# Patient Record
Sex: MALE | Employment: UNEMPLOYED | URBAN - METROPOLITAN AREA
[De-identification: names, ages, dates, MRNs, and addresses within clinical notes are randomized per-mention and may not be internally consistent; named-entity substitution may affect disease eponyms.]

---

## 2017-07-08 ENCOUNTER — HOSPITAL ENCOUNTER (INPATIENT)
Age: 52
LOS: 8 days | Discharge: HOME HEALTH | DRG: 871 | End: 2017-07-16
Attending: EMERGENCY MEDICINE | Admitting: INTERNAL MEDICINE
Payer: MEDICARE

## 2017-07-08 ENCOUNTER — APPOINTMENT (OUTPATIENT)
Dept: GENERAL RADIOLOGY | Age: 52
DRG: 871 | End: 2017-07-08
Attending: EMERGENCY MEDICINE
Payer: MEDICARE

## 2017-07-08 DIAGNOSIS — J18.9 PNEUMONIA DUE TO INFECTIOUS ORGANISM, UNSPECIFIED LATERALITY, UNSPECIFIED PART OF LUNG: Primary | ICD-10-CM

## 2017-07-08 PROBLEM — I95.9 HYPOTENSION: Status: ACTIVE | Noted: 2017-07-08

## 2017-07-08 PROBLEM — A41.9 SEPSIS (HCC): Status: ACTIVE | Noted: 2017-07-08

## 2017-07-08 PROBLEM — J69.0 ASPIRATION PNEUMONIA (HCC): Status: ACTIVE | Noted: 2017-07-08

## 2017-07-08 PROBLEM — G93.40 ACUTE ENCEPHALOPATHY: Status: ACTIVE | Noted: 2017-07-08

## 2017-07-08 LAB
ALBUMIN SERPL BCP-MCNC: 3.4 G/DL (ref 3.5–5)
ALBUMIN/GLOB SERPL: 0.8 {RATIO} (ref 1.1–2.2)
ALP SERPL-CCNC: 121 U/L (ref 45–117)
ALT SERPL-CCNC: 31 U/L (ref 12–78)
ANION GAP BLD CALC-SCNC: 10 MMOL/L (ref 5–15)
AST SERPL W P-5'-P-CCNC: 39 U/L (ref 15–37)
BASOPHILS # BLD AUTO: 0 K/UL (ref 0–0.1)
BASOPHILS # BLD: 0 % (ref 0–1)
BILIRUB SERPL-MCNC: 0.2 MG/DL (ref 0.2–1)
BUN SERPL-MCNC: 23 MG/DL (ref 6–20)
BUN/CREAT SERPL: 24 (ref 12–20)
CALCIUM SERPL-MCNC: 8.5 MG/DL (ref 8.5–10.1)
CHLORIDE SERPL-SCNC: 96 MMOL/L (ref 97–108)
CO2 SERPL-SCNC: 29 MMOL/L (ref 21–32)
CREAT SERPL-MCNC: 0.96 MG/DL (ref 0.7–1.3)
DIFFERENTIAL METHOD BLD: ABNORMAL
EOSINOPHIL # BLD: 0 K/UL (ref 0–0.4)
EOSINOPHIL NFR BLD: 0 % (ref 0–7)
ERYTHROCYTE [DISTWIDTH] IN BLOOD BY AUTOMATED COUNT: 12.8 % (ref 11.5–14.5)
GLOBULIN SER CALC-MCNC: 4.4 G/DL (ref 2–4)
GLUCOSE SERPL-MCNC: 99 MG/DL (ref 65–100)
HCT VFR BLD AUTO: 36.7 % (ref 36.6–50.3)
HGB BLD-MCNC: 12 G/DL (ref 12.1–17)
LACTATE SERPL-SCNC: 2.1 MMOL/L (ref 0.4–2)
LYMPHOCYTES # BLD AUTO: 6 % (ref 12–49)
LYMPHOCYTES # BLD: 0.5 K/UL (ref 0.8–3.5)
MCH RBC QN AUTO: 32.1 PG (ref 26–34)
MCHC RBC AUTO-ENTMCNC: 32.7 G/DL (ref 30–36.5)
MCV RBC AUTO: 98.1 FL (ref 80–99)
MONOCYTES # BLD: 0.8 K/UL (ref 0–1)
MONOCYTES NFR BLD AUTO: 10 % (ref 5–13)
NEUTS BAND NFR BLD MANUAL: 10 %
NEUTS SEG # BLD: 6.4 K/UL (ref 1.8–8)
NEUTS SEG NFR BLD AUTO: 74 % (ref 32–75)
PLATELET # BLD AUTO: 164 K/UL (ref 150–400)
POTASSIUM SERPL-SCNC: 3.8 MMOL/L (ref 3.5–5.1)
PROT SERPL-MCNC: 7.8 G/DL (ref 6.4–8.2)
RBC # BLD AUTO: 3.74 M/UL (ref 4.1–5.7)
RBC MORPH BLD: ABNORMAL
SODIUM SERPL-SCNC: 135 MMOL/L (ref 136–145)
WBC # BLD AUTO: 7.7 K/UL (ref 4.1–11.1)

## 2017-07-08 PROCEDURE — 74011000258 HC RX REV CODE- 258: Performed by: INTERNAL MEDICINE

## 2017-07-08 PROCEDURE — 71020 XR CHEST PA LAT: CPT

## 2017-07-08 PROCEDURE — 74011000258 HC RX REV CODE- 258: Performed by: FAMILY MEDICINE

## 2017-07-08 PROCEDURE — 85025 COMPLETE CBC W/AUTO DIFF WBC: CPT | Performed by: EMERGENCY MEDICINE

## 2017-07-08 PROCEDURE — 74011000250 HC RX REV CODE- 250: Performed by: FAMILY MEDICINE

## 2017-07-08 PROCEDURE — 87040 BLOOD CULTURE FOR BACTERIA: CPT | Performed by: EMERGENCY MEDICINE

## 2017-07-08 PROCEDURE — 87070 CULTURE OTHR SPECIMN AEROBIC: CPT | Performed by: INTERNAL MEDICINE

## 2017-07-08 PROCEDURE — 74011250636 HC RX REV CODE- 250/636: Performed by: FAMILY MEDICINE

## 2017-07-08 PROCEDURE — 77030011256 HC DRSG MEPILEX <16IN NO BORD MOLN -A

## 2017-07-08 PROCEDURE — 83605 ASSAY OF LACTIC ACID: CPT | Performed by: FAMILY MEDICINE

## 2017-07-08 PROCEDURE — 87077 CULTURE AEROBIC IDENTIFY: CPT | Performed by: INTERNAL MEDICINE

## 2017-07-08 PROCEDURE — 80053 COMPREHEN METABOLIC PANEL: CPT | Performed by: EMERGENCY MEDICINE

## 2017-07-08 PROCEDURE — 96374 THER/PROPH/DIAG INJ IV PUSH: CPT

## 2017-07-08 PROCEDURE — 74011250636 HC RX REV CODE- 250/636: Performed by: INTERNAL MEDICINE

## 2017-07-08 PROCEDURE — 99285 EMERGENCY DEPT VISIT HI MDM: CPT

## 2017-07-08 PROCEDURE — 65610000006 HC RM INTENSIVE CARE

## 2017-07-08 PROCEDURE — 36415 COLL VENOUS BLD VENIPUNCTURE: CPT | Performed by: EMERGENCY MEDICINE

## 2017-07-08 PROCEDURE — 87186 SC STD MICRODIL/AGAR DIL: CPT | Performed by: INTERNAL MEDICINE

## 2017-07-08 RX ORDER — DOCUSATE SODIUM 100 MG/1
100 CAPSULE, LIQUID FILLED ORAL 2 TIMES DAILY
Status: DISCONTINUED | OUTPATIENT
Start: 2017-07-08 | End: 2017-07-16 | Stop reason: HOSPADM

## 2017-07-08 RX ORDER — OMEPRAZOLE 20 MG/1
20 CAPSULE, DELAYED RELEASE ORAL
COMMUNITY

## 2017-07-08 RX ORDER — PHENOBARBITAL 30 MG/1
30 TABLET ORAL 2 TIMES DAILY
COMMUNITY

## 2017-07-08 RX ORDER — PHENOBARBITAL 32.4 MG/1
32.4 TABLET ORAL 2 TIMES DAILY
Status: DISCONTINUED | OUTPATIENT
Start: 2017-07-08 | End: 2017-07-08

## 2017-07-08 RX ORDER — ACETAMINOPHEN 650 MG/1
650 SUPPOSITORY RECTAL
Status: DISCONTINUED | OUTPATIENT
Start: 2017-07-08 | End: 2017-07-16 | Stop reason: HOSPADM

## 2017-07-08 RX ORDER — HYDROMORPHONE HYDROCHLORIDE 1 MG/ML
0.2 INJECTION, SOLUTION INTRAMUSCULAR; INTRAVENOUS; SUBCUTANEOUS
Status: DISCONTINUED | OUTPATIENT
Start: 2017-07-08 | End: 2017-07-16 | Stop reason: HOSPADM

## 2017-07-08 RX ORDER — PHENOBARBITAL SODIUM 65 MG/ML
30 INJECTION INTRAMUSCULAR EVERY 12 HOURS
Status: DISCONTINUED | OUTPATIENT
Start: 2017-07-08 | End: 2017-07-09

## 2017-07-08 RX ORDER — ASCORBIC ACID 500 MG
500 TABLET ORAL 2 TIMES DAILY
Status: DISCONTINUED | OUTPATIENT
Start: 2017-07-08 | End: 2017-07-16 | Stop reason: HOSPADM

## 2017-07-08 RX ORDER — DIVALPROEX SODIUM 125 MG/1
250 TABLET, DELAYED RELEASE ORAL EVERY EVENING
COMMUNITY
End: 2018-06-23

## 2017-07-08 RX ORDER — SODIUM CHLORIDE 0.9 % (FLUSH) 0.9 %
5-10 SYRINGE (ML) INJECTION EVERY 8 HOURS
Status: DISCONTINUED | OUTPATIENT
Start: 2017-07-08 | End: 2017-07-16 | Stop reason: HOSPADM

## 2017-07-08 RX ORDER — DIVALPROEX SODIUM 125 MG/1
125 TABLET, DELAYED RELEASE ORAL DAILY
COMMUNITY
End: 2019-01-04

## 2017-07-08 RX ORDER — POLYETHYLENE GLYCOL 3350 17 G/17G
17 POWDER, FOR SOLUTION ORAL
Status: DISCONTINUED | OUTPATIENT
Start: 2017-07-08 | End: 2017-07-16 | Stop reason: HOSPADM

## 2017-07-08 RX ORDER — PANTOPRAZOLE SODIUM 40 MG/1
40 TABLET, DELAYED RELEASE ORAL
Status: DISCONTINUED | OUTPATIENT
Start: 2017-07-08 | End: 2017-07-09

## 2017-07-08 RX ORDER — ONDANSETRON 2 MG/ML
4 INJECTION INTRAMUSCULAR; INTRAVENOUS
Status: DISCONTINUED | OUTPATIENT
Start: 2017-07-08 | End: 2017-07-16 | Stop reason: HOSPADM

## 2017-07-08 RX ORDER — ENOXAPARIN SODIUM 100 MG/ML
30 INJECTION SUBCUTANEOUS EVERY 24 HOURS
Status: DISCONTINUED | OUTPATIENT
Start: 2017-07-08 | End: 2017-07-10

## 2017-07-08 RX ORDER — DIPHENHYDRAMINE HYDROCHLORIDE 50 MG/ML
12.5 INJECTION, SOLUTION INTRAMUSCULAR; INTRAVENOUS
Status: DISCONTINUED | OUTPATIENT
Start: 2017-07-08 | End: 2017-07-16 | Stop reason: HOSPADM

## 2017-07-08 RX ORDER — SODIUM CHLORIDE 0.9 % (FLUSH) 0.9 %
5-10 SYRINGE (ML) INJECTION AS NEEDED
Status: DISCONTINUED | OUTPATIENT
Start: 2017-07-08 | End: 2017-07-16 | Stop reason: HOSPADM

## 2017-07-08 RX ORDER — IPRATROPIUM BROMIDE AND ALBUTEROL SULFATE 2.5; .5 MG/3ML; MG/3ML
3 SOLUTION RESPIRATORY (INHALATION)
Status: DISCONTINUED | OUTPATIENT
Start: 2017-07-08 | End: 2017-07-16 | Stop reason: HOSPADM

## 2017-07-08 RX ORDER — ACETAMINOPHEN 500 MG
1000 TABLET ORAL
COMMUNITY

## 2017-07-08 RX ORDER — DIVALPROEX SODIUM 250 MG/1
250 TABLET, DELAYED RELEASE ORAL EVERY EVENING
Status: DISCONTINUED | OUTPATIENT
Start: 2017-07-08 | End: 2017-07-08

## 2017-07-08 RX ORDER — NALOXONE HYDROCHLORIDE 0.4 MG/ML
0.4 INJECTION, SOLUTION INTRAMUSCULAR; INTRAVENOUS; SUBCUTANEOUS AS NEEDED
Status: DISCONTINUED | OUTPATIENT
Start: 2017-07-08 | End: 2017-07-16 | Stop reason: HOSPADM

## 2017-07-08 RX ORDER — PHENOBARBITAL 30 MG/1
30 TABLET ORAL 2 TIMES DAILY
Status: DISCONTINUED | OUTPATIENT
Start: 2017-07-08 | End: 2017-07-08 | Stop reason: SDUPTHER

## 2017-07-08 RX ORDER — DIVALPROEX SODIUM 125 MG/1
125 TABLET, DELAYED RELEASE ORAL DAILY
Status: DISCONTINUED | OUTPATIENT
Start: 2017-07-09 | End: 2017-07-08

## 2017-07-08 RX ORDER — DEXTROSE MONOHYDRATE AND SODIUM CHLORIDE 5; .9 G/100ML; G/100ML
100 INJECTION, SOLUTION INTRAVENOUS CONTINUOUS
Status: DISCONTINUED | OUTPATIENT
Start: 2017-07-08 | End: 2017-07-09

## 2017-07-08 RX ORDER — GUAIFENESIN AND DEXTROMETHORPHAN HBR 20; 400 MG/1; MG/1
1 TABLET ORAL
COMMUNITY
End: 2018-06-23

## 2017-07-08 RX ORDER — DIVALPROEX SODIUM 250 MG/1
500 TABLET, DELAYED RELEASE ORAL 2 TIMES DAILY
Status: DISCONTINUED | OUTPATIENT
Start: 2017-07-08 | End: 2017-07-08

## 2017-07-08 RX ADMIN — ENOXAPARIN SODIUM 30 MG: 30 INJECTION SUBCUTANEOUS at 18:05

## 2017-07-08 RX ADMIN — DEXTROSE MONOHYDRATE AND SODIUM CHLORIDE 100 ML/HR: 5; .9 INJECTION, SOLUTION INTRAVENOUS at 17:09

## 2017-07-08 RX ADMIN — SODIUM CHLORIDE 1000 ML: 900 INJECTION, SOLUTION INTRAVENOUS at 14:58

## 2017-07-08 RX ADMIN — DIPHENHYDRAMINE HYDROCHLORIDE 12.5 MG: 50 INJECTION, SOLUTION INTRAMUSCULAR; INTRAVENOUS at 21:29

## 2017-07-08 RX ADMIN — PIPERACILLIN SODIUM,TAZOBACTAM SODIUM 3.38 G: 3; .375 INJECTION, POWDER, FOR SOLUTION INTRAVENOUS at 16:47

## 2017-07-08 RX ADMIN — AZITHROMYCIN MONOHYDRATE 500 MG: 500 INJECTION, POWDER, LYOPHILIZED, FOR SOLUTION INTRAVENOUS at 15:59

## 2017-07-08 RX ADMIN — CEFTRIAXONE SODIUM 1 G: 1 INJECTION, POWDER, FOR SOLUTION INTRAMUSCULAR; INTRAVENOUS at 15:17

## 2017-07-08 RX ADMIN — PHENOBARBITAL SODIUM 30 MG: 65 INJECTION INTRAMUSCULAR; INTRAVENOUS at 21:28

## 2017-07-08 RX ADMIN — VALPROATE SODIUM 750 MG: 100 INJECTION, SOLUTION INTRAVENOUS at 21:28

## 2017-07-08 NOTE — ED TRIAGE NOTES
Fevered and congested cough, purulent drainage on his pillow this morning per mother. PMH aspiration pneumonia.

## 2017-07-08 NOTE — H&P
[facility] [place] [facility]  [address]  [phone]    Admission History and Physical      NAME:  Tori Thibodeaux   :   1965   MRN:  [id number]     PCP:  Oliverio Garcia MD     Date/Time:  2017         Subjective:     CHIEF COMPLAINT: fever, cough     HISTORY OF PRESENT ILLNESS:     The patient is a 46 yo hx of cerebral palsy, bedbound, seizure d/o, presented w/ fever, sepsis, possible aspiration pneumonia. The patient cannot give a history. His mother, who is the POA/care taker, stated that the patient has had a worsening productive cough for several weeks, associated with dyspnea, fevers, chills. He has a hx of frequent aspiration pneumonia. The patient recently saw his PCP who prescribed cefdinir for 10 days, which helped his symptoms, but now worsened again. In the ED, temp was 100. WBC 7.7. SBP ~80s. CXR unremarkable. Allergies   Allergen Reactions    Other Medication Swelling     Mother states the patient had a reaction to an antibiotic as a child  She cannot recall the name of the antibiotic nor the indication   Per [id number] [phone] [id number] the patient has had prescriptions for Augmentin       Prior to Admission medications    Medication Sig Start Date End Date Taking? Authorizing Provider   ondansetron (ZOFRAN ODT) 4 mg disintegrating tablet Take 1 Tab by mouth every eight (8) hours as needed for Nausea. 3/6/16   Valentine Hinds MD   pantoprazole (PROTONIX) 20 mg tablet Take 20 mg by mouth daily. Jovanny Mckay MD   phenobarbital (LUMINAL) 64.8 mg tablet Take 1 tablet by mouth two (2) times a day. 14   Stuart Currie MD   ASCORBATE CALCIUM (CORA-C PO) Take 1,000 mg by mouth daily. Historical Provider   DOCUSATE CALCIUM (STOOL SOFTENER PO) Take 1 tablet by mouth two (2) times daily as needed.     Historical Provider   triamcinolone acetonide (KENALOG) 0.1 % topical cream Apply  to affected area two (2) times daily as needed for Skin Irritation. use thin layer    Historical Provider   divalproex DR (DEPAKOTE) 500 mg tablet Take 1 Tab by mouth two (2) times a day. 4/23/14   Phyliss Dull, NP   FOLIC ACID/MV,FE,OTHER MIN (CENTRUM PO) Take 1 tablet by mouth daily. Historical Provider   POLYETHYLENE GLYCOL 3350 (MIRALAX PO) Take  by mouth daily as needed (constipation).     Historical Provider       Past Medical History:   Diagnosis Date    Aspiration pneumonia ([address]     Cerebral palsy ([address]     Ill-defined condition     dsyphasia    Scoliosis     Seizures [address] [address]         Past Surgical History:   Procedure Laterality Date    HX HEENT         Social History   Substance Use Topics    Smoking status: Never Smoker    Smokeless tobacco: Never Used    Alcohol use No        Family History   Problem Relation Age of Onset    Cancer Father      prostate    Cancer Maternal Aunt     Heart Disease Maternal Aunt     Cancer Maternal Grandmother     Cancer Maternal Grandfather     Cancer Paternal Grandmother     Cancer Paternal Grandfather     Cancer Other         Review of Systems: (cannot obtain due to cerebral palsy)  (bold if positive, if negative)    Gen:  Eyes:  ENT:  CVS:  Pulm:  GI:    :    MS:  Skin:  Psych:  Endo:    Hem:  Renal:    Neuro:          Objective:      VITALS:    Vital signs reviewed; most recent are:    Visit Vitals    BP (!) 81/62    Pulse (!) 111    Temp 99.9 °F (37.7 °C)    Resp 20    Ht 5' 7\" (1.702 m)    Wt 35.8 kg (79 lb)    SpO2 95%    BMI 12.37 kg/m2     SpO2 Readings from Last 6 Encounters:   07/08/17 95%   03/10/16 100%   03/06/16 98%   09/06/14 99%   09/04/14 94%   11/22/13 99%        No intake or output data in the 24 hours ending 07/08/17 1828     Exam:     Physical Exam:    Gen:  Chronically ill-appearing, cachectic, mild distress  HEENT:  Pink conjunctivae, PERRL, hearing intact to voice, moist mucous membranes  Neck:  Supple, without masses, thyroid non-tender  Resp:  No accessory muscle use, coarse BS at bases  Card:  No murmurs, normal S1, S2 without thrills, bruits or peripheral edema  Abd:  Soft, non-tender, non-distended, normoactive bowel sounds are present  Lymph:  No cervical adenopathy  Musc:  No cyanosis or clubbing  Skin:  No rashes   Neuro:  Contracted, moved all ext  Psych:  Awake, alert and oriented x0. Makes moaning sounds    Labs:    Recent Labs      07/08/17   1422   WBC  7.7   HGB  12.0*   HCT  36.7   PLT  164     Recent Labs      07/08/17   1422   NA  135*   K  3.8   CL  96*   CO2  29   GLU  99   BUN  23*   CREA  0.96   CA  8.5   ALB  3.4*   TBILI  0.2   SGOT  39*   ALT  31     Lab Results   Component Value Date/Time    Glucose (POC) 91 08/02/2012 08:18 PM     No results for input(s): PH, PCO2, PO2, HCO3, FIO2 in the last 72 hours. No results for input(s): INR in the last 72 hours. No lab exists for component: INREXT    Radiology and EKG reviewed:   CXR neg    **Old Records reviewed in [facility]**       Assessment/Plan:       Principal Problem:    48 yo hx of cerebral palsy, bedbound, seizure d/o, presented w/ fever, sepsis, possible aspiration pneumonia    1) Sepsis/possible aspiration PNA: 2/4 SIRS on admission, clinically has pneumonia despite negative CXR. High risks for aspiration. Failed outpatient Abx. Will monitor on step down. Check sputum Cx, blood Cx. Empirically start on IV Zosyn, Azithro (for atypical)    2) Acute encephalopathy: more awake at baseline per mom, but now more lethargic. Likely due to sepsis. Will check depakote and phenobarb levels. Monitor closely    3) Hypotension: due to sepsis. Slowly improving with IVF. Will monitor closely    4) Dysphagia/severe protein-jesus malnutrition: has severe muscle wasting. Will keep NPO due to aspiration PNA.   Consult speech, nutrition    5) Cerebral palsy/seizure d/o: cont depakote, phenobarb    Code: Full - discussed with mom/POA    Risk of deterioration: high      Total time spent with patient: 70 Minutes Care Plan discussed with: Family, nursing.   I signed out to Dr. Epifanio Schroeder, who will resume care of patient    Discussed:  Care Plan    Prophylaxis:  Lovenox    Probable Disposition:  Home w/Family           ___________________________________________________    Attending Physician: Cesar Berry MD

## 2017-07-08 NOTE — PROGRESS NOTES
[facility] Dosing Services: 7/8/2017    Consult by Dr. Josie Veloz for conversion of Phenobarbital and Depakote from oral to IV. Home doses:    Phenobarbital 30 mg PO BID   Depakote  mg PO daily in the morning and Depakote  mg PO every evening    Assessment/Plan: Oral bioavailability of Phenobarbital is approximately 70-90%. Order entered for Phenobarbital 30 mg IV every 12 hours. Per [employer], when converting Valproic Acid and derivatives to IV, \"total daily IV dose should be equivalent to the total daily dose of the oral valproate product; administer with the same frequency as oral products. \" Order entered for Valproate 625 mg IV every 24 hours at 0900 and Valproate 750 mg IV every 24 hours at 2100. Pharmacist will continue to monitor and will contact physician to recommend resumption of oral medications as soon as patient able to tolerate.     Sandy Haas, Pharmacist

## 2017-07-08 NOTE — ROUTINE PROCESS
Code Purple SBAR    Report given Maida TSAI. SIRS Criteria HR >90 bpm, SBP <90 / MAP <65  Mental Status Level of Consciousness: Alert    Labs/Lactic Acid Drawn at:   1424, Lactate 1 result: 2.1, Repeat Lactate due: If yes, time due: 1823    Fluid resuscitation total 1000mL Completed    Antibiotics Hanging?  YES    Vasopressor Hanging  No    Visit Vitals    BP (!) 87/70    Pulse (!) 111    Temp 99.9 °F (37.7 °C)    Resp 20    Ht 5' 7\" (1.702 m)    Wt 35.8 kg (79 lb)    SpO2 94%    BMI 12.37 kg/m2

## 2017-07-08 NOTE — PROGRESS NOTES
[facility]: MED RECONCILIATION    Comments/Recommendations:    Cefdinir 300 mg PO BID x 10 days from 6/26/2017 through 7/5/2017. Per patient's mother, was started because patient was listless, lethargic, and had congestion. Patient completed full course of antibiotics.  Divalproex DR - Patient takes 500 mg PO BID and also takes 125 mg in the morning and 250 mg in the evening.  Patient takes most medications in applesauce. Per patient's mother, he swallows tablets/capsules whole with the applesauce, he does not chew the tablets/capsules. Medications added:     · Divalproex  mg - Takes 1 PO every morning and 2 PO every evening. (Takes in addition to Divalproex  mg PO twice daily.)    Medications removed:    · Multivitamin  · Ondansetron  · Pantoprazole  · Miralax    Medications adjusted:    · Phenobarbital 30 mg PO BID    Information obtained from: patient's mother    Allergies: Other medication - per patient's mother, he had a reaction as a child to an antibiotic but she does not recall the name of the medication. Patient recently tolerated 10 days of Cefdinir. Prior to Admission Medications   Prescriptions Last Dose Informant Patient Reported? Taking? DOCUSATE CALCIUM (STOOL SOFTENER PO)  Parent Yes Yes   Sig: Take 1 tablet by mouth two (2) times daily as needed. PHENobarbital (LUMINAL) 30 mg tablet 7/8/2017 at AM Parent Yes Yes   Sig: Take 30 mg by mouth two (2) times a day. acetaminophen (TYLENOL) 500 mg tablet 7/8/2017 at AM Parent Yes Yes   Sig: Take 1,000 mg by mouth every six (6) hours as needed for Pain. ascorbic acid, vitamin C, powd 7/8/2017 at Unknown time Parent Yes Yes   Sig: Take 1,000 mg by mouth daily. Mix with liquid prior to administration. divalproex DR (DEPAKOTE) 125 mg tablet 7/8/2017 at AM Parent Yes Yes   Sig: Take 125 mg by mouth daily. divalproex DR (DEPAKOTE) 125 mg tablet 7/7/2017 at PM Parent Yes Yes   Sig: Take 250 mg by mouth every evening.    divalproex DR (DEPAKOTE) 500 mg tablet 7/8/2017 at AM Parent No Yes   Sig: Take 1 Tab by mouth two (2) times a day. guaiFENesin-dextromethorphan (G-FENESIN DM)  mg tab tablet 7/8/2017 at AM Parent Yes Yes   Sig: Take 1 Tab by mouth every four (4) hours as needed. omeprazole (PRILOSEC) 20 mg capsule 7/8/2017 at AM Parent Yes Yes   Sig: Take 20 mg by mouth Before breakfast and dinner. triamcinolone acetonide (KENALOG) 0.1 % topical cream  Parent Yes No   Sig: Apply  to affected area two (2) times daily as needed for Skin Irritation.  use thin layer        Austen Duong, Pharmacist

## 2017-07-08 NOTE — ED PROVIDER NOTES
Patient is a 47 y.o. male presenting with cough. The history is provided by a parent. Cough   This is a new problem. The current episode started more than 1 week ago (2-3 weeks ago). The problem occurs hourly. Progression since onset: improved with omnicef given outpatient for 10 day course, still present now after finishing  The cough is productive of sputum. There has been a fever of 100 - 100.9 F. Pertinent negatives include no vomiting. He has tried antibiotics for the symptoms. He is not a smoker. His past medical history is significant for pneumonia. Past Medical History:   Diagnosis Date    [facility] [address]     Ill-defined condition     dsyphasia    Scoliosis     Seizures ([facility])        Past Surgical History:   Procedure Laterality Date    HX HEENT           Family History:   Problem Relation Age of Onset    Cancer Father      prostate    Cancer Maternal Aunt     Heart Disease Maternal Aunt     Cancer Maternal Grandmother     Cancer Maternal Grandfather     Cancer Paternal Grandmother     Cancer Paternal Grandfather     Cancer Other        Social History     Social History    Marital status: SINGLE     Spouse name: N/A    Number of children: N/A    Years of education: N/A     Occupational History    Not on file. Social History Main Topics    Smoking status: Never Smoker    Smokeless tobacco: Never Used    Alcohol use No    Drug use: No    Sexual activity: Not on file     Other Topics Concern    Not on file     Social History Narrative         ALLERGIES: Other medication    Review of Systems   Respiratory: Positive for cough. Gastrointestinal: Negative for diarrhea and vomiting. Genitourinary: Negative for hematuria. Skin: Negative for color change and rash.        Vitals:    07/08/17 1350 07/08/17 1415 07/08/17 1425   BP: 97/50 (!) 88/61    Pulse: (!) 111     Resp: 20     Temp: 99.9 °F (37.7 °C)     SpO2: 90% 94% 94%   Weight: 35.8 kg (79 lb)     Height: 5' 7\" (1.702 m)              Physical Exam   Constitutional: He is oriented to person, place, and time. No distress. HENT:   Head: Atraumatic. Mouth/Throat: No oropharyngeal exudate. Eyes: Conjunctivae and EOM are normal. Pupils are equal, round, and reactive to light. Right eye exhibits no discharge. Left eye exhibits no discharge. No scleral icterus. Neck: Normal range of motion. Neck supple. No thyromegaly present. Cardiovascular: Regular rhythm. Exam reveals no gallop and no friction rub. No murmur heard. tachcyardic   Pulmonary/Chest: Effort normal. No respiratory distress. He has no wheezes. He has no rales. Abdominal: Soft. He exhibits no distension and no mass. There is no tenderness. There is no rebound. Musculoskeletal:   Severe scoliosis   Lymphadenopathy:     He has no cervical adenopathy. Neurological: He is alert and oriented to person, place, and time. No cranial nerve deficit. Skin: Skin is warm. No rash noted. He is not diaphoretic. No erythema. Psychiatric: He has a normal mood and affect. His behavior is normal.   Nursing note and vitals reviewed. CXR is reviewed in comparison to prior shows consolidation over cardiac silhouette. MDM  Number of Diagnoses or Management Options  Diagnosis management comments: SIRS with source, hx PNA and concern for aspiration. IV abx, sepsis protocol, admission. ED Course       Procedures      Dr. Corinne Leys will evaluate for admission.

## 2017-07-08 NOTE — IP AVS SNAPSHOT
Current Discharge Medication List  
  
START taking these medications Dose & Instructions Dispensing Information Comments Morning Noon Evening Bedtime  
 levoFLOXacin 500 mg tablet Commonly known as:  Kasey Simsn Your last dose was: Your next dose is:    
   
   
 Dose:  500 mg Take 1 Tab by mouth daily. Quantity:  7 Tab Refills:  0 CONTINUE these medications which have NOT CHANGED Dose & Instructions Dispensing Information Comments Morning Noon Evening Bedtime  
 acetaminophen 500 mg tablet Commonly known as:  TYLENOL Your last dose was: Your next dose is:    
   
   
 Dose:  1000 mg Take 1,000 mg by mouth every six (6) hours as needed for Pain. Refills:  0  
     
   
   
   
  
 ascorbic acid (vitamin C) Powd Your last dose was: Your next dose is:    
   
   
 Dose:  1000 mg Take 1,000 mg by mouth daily. Mix with liquid prior to administration. Refills:  0  
     
   
   
   
  
 * divalproex  mg tablet Commonly known as:  DEPAKOTE Your last dose was: Your next dose is:    
   
   
 Dose:  125 mg Take 125 mg by mouth daily. Refills:  0  
     
   
   
   
  
 * divalproex  mg tablet Commonly known as:  DEPAKOTE Your last dose was: Your next dose is:    
   
   
 Dose:  250 mg Take 250 mg by mouth every evening. Refills:  0  
     
   
   
   
  
 * divalproex  mg tablet Commonly known as:  DEPAKOTE Your last dose was: Your next dose is:    
   
   
 Dose:  500 mg Take 1 Tab by mouth two (2) times a day. Quantity:  60 Tab Refills:  0 Needs a follow up for additional refills  
    
   
   
   
  
 guaiFENesin-dextromethorphan  mg Tab tablet Commonly known as:  G-FENESIN DM Your last dose was: Your next dose is:    
   
   
 Dose:  1 Tab Take 1 Tab by mouth every four (4) hours as needed. Refills:  0  
     
   
   
   
  
 omeprazole 20 mg capsule Commonly known as:  PRILOSEC Your last dose was: Your next dose is:    
   
   
 Dose:  20 mg Take 20 mg by mouth Before breakfast and dinner. Refills:  0 PHENobarbital 30 mg tablet Commonly known as:  LUMINAL Your last dose was: Your next dose is:    
   
   
 Dose:  30 mg Take 30 mg by mouth two (2) times a day. Refills:  0 STOOL SOFTENER PO Your last dose was: Your next dose is:    
   
   
 Dose:  1 Tab Take 1 tablet by mouth two (2) times daily as needed. Refills:  0  
     
   
   
   
  
 triamcinolone acetonide 0.1 % topical cream  
Commonly known as:  KENALOG Your last dose was: Your next dose is:    
   
   
 Apply  to affected area two (2) times daily as needed for Skin Irritation. use thin layer Refills:  0  
     
   
   
   
  
 * Notice: This list has 3 medication(s) that are the same as other medications prescribed for you. Read the directions carefully, and ask your doctor or other care provider to review them with you. Where to Get Your Medications These medications were sent to [facility], [address] [facility]  [address] [facility] [address] [facility] Hours:  24-hours Phone:  [phone]  
  levoFLOXacin 500 mg tablet

## 2017-07-08 NOTE — IP AVS SNAPSHOT
Dusty Mtz 
 
 
 [address] 
[phone] Patient: Teja Fischer MRN: [id number] :1965 You are allergic to the following Allergen Reactions Other Medication Swelling Mother states the patient had a reaction to an antibiotic as a child She cannot recall the name of the antibiotic nor the indication Per [facility] [phone] the patient has had prescriptions for Augmentin Tolerates Cefdinir. Recent Documentation Height Weight BMI Smoking Status 1.702 m 40.4 kg 13.95 kg/m2 Never Smoker Emergency Contacts Name Discharge Info Relation Home Work Mobile Shanti Tamez DISCHARGE CAREGIVER [3] Parent [1] [phone] Juanito Soto  Other Relative [6]   [phone] Eloy Soto  Other Relative [6]   [phone] About your hospitalization You were admitted on:  2017 You last received care in the:  [facility] 5M1 MED SURG 1 You were discharged on:  2017 Unit phone number:  [phone] Why you were hospitalized Your primary diagnosis was:  Sepsis (Hcc) Your diagnoses also included:  Aspiration Pneumonia (Hcc), Hypotension, Acute Encephalopathy, Cerebral Palsy (Hcc) Providers Seen During Your Hospitalizations Provider Role Specialty Primary office phone Loreta Borjas MD Attending Provider Emergency Medicine [phone] Isidoro Espino MD Attending Provider [facility] [phone] Your Primary Care Physician (PCP) Primary Care Physician Office Phone Office Fax Sarai, [address] [place] [address] [phone] Follow-up Information Follow up With Details Comments Contact Info Lidia Rose MD   [address] [address] 
[phone] [address]   [place] [address] [place] [address] 
[phone] Current Discharge Medication List  
  
 START taking these medications Dose & Instructions Dispensing Information Comments Morning Noon Evening Bedtime  
 levoFLOXacin 500 mg tablet Commonly known as:  Mittie Bitter Your last dose was: Your next dose is:    
   
   
 Dose:  500 mg Take 1 Tab by mouth daily. Quantity:  7 Tab Refills:  0 CONTINUE these medications which have NOT CHANGED Dose & Instructions Dispensing Information Comments Morning Noon Evening Bedtime  
 acetaminophen 500 mg tablet Commonly known as:  TYLENOL Your last dose was: Your next dose is:    
   
   
 Dose:  1000 mg Take 1,000 mg by mouth every six (6) hours as needed for Pain. Refills:  0  
     
   
   
   
  
 ascorbic acid (vitamin C) Powd Your last dose was: Your next dose is:    
   
   
 Dose:  1000 mg Take 1,000 mg by mouth daily. Mix with liquid prior to administration. Refills:  0  
     
   
   
   
  
 * divalproex  mg tablet Commonly known as:  DEPAKOTE Your last dose was: Your next dose is:    
   
   
 Dose:  125 mg Take 125 mg by mouth daily. Refills:  0  
     
   
   
   
  
 * divalproex  mg tablet Commonly known as:  DEPAKOTE Your last dose was: Your next dose is:    
   
   
 Dose:  250 mg Take 250 mg by mouth every evening. Refills:  0  
     
   
   
   
  
 * divalproex  mg tablet Commonly known as:  DEPAKOTE Your last dose was: Your next dose is:    
   
   
 Dose:  500 mg Take 1 Tab by mouth two (2) times a day. Quantity:  60 Tab Refills:  0 Needs a follow up for additional refills  
    
   
   
   
  
 guaiFENesin-dextromethorphan  mg Tab tablet Commonly known as:  G-FENESIN DM Your last dose was: Your next dose is:    
   
   
 Dose:  1 Tab Take 1 Tab by mouth every four (4) hours as needed. Refills:  0 omeprazole 20 mg capsule Commonly known as:  PRILOSEC Your last dose was: Your next dose is:    
   
   
 Dose:  20 mg Take 20 mg by mouth Before breakfast and dinner. Refills:  0 PHENobarbital 30 mg tablet Commonly known as:  LUMINAL Your last dose was: Your next dose is:    
   
   
 Dose:  30 mg Take 30 mg by mouth two (2) times a day. Refills:  0 STOOL SOFTENER PO Your last dose was: Your next dose is:    
   
   
 Dose:  1 Tab Take 1 tablet by mouth two (2) times daily as needed. Refills:  0  
     
   
   
   
  
 triamcinolone acetonide 0.1 % topical cream  
Commonly known as:  KENALOG Your last dose was: Your next dose is:    
   
   
 Apply  to affected area two (2) times daily as needed for Skin Irritation. use thin layer Refills:  0  
     
   
   
   
  
 * Notice: This list has 3 medication(s) that are the same as other medications prescribed for you. Read the directions carefully, and ask your doctor or other care provider to review them with you. Where to Get Your Medications These medications were sent to [facility], [facility]  [address] [facility] [address] [facility] Hours:  24-hours Phone:  [phone]  
  levoFLOXacin 500 mg tablet Discharge Instructions Patient Discharge Instructions Grant Desai / [id number] : 1965 Admitted 2017 Discharged: 2017 9:38 AM  
 
ACUTE DIAGNOSES: 
Sepsis ([facility] CHRONIC MEDICAL DIAGNOSES: 
Problem List as of 2017  Date Reviewed: 2017 Codes Class Noted - Resolved * (Principal)Sepsis ([facility] ICD-10-CM: A41.9 ICD-9-CM: 038.9, 995.91  2017 - Present Aspiration pneumonia ([facility] ICD-10-CM: J69.0 ICD-9-CM: 507.0  2017 - Present Hypotension ICD-10-CM: I95.9 ICD-9-CM: 458.9  7/8/2017 - Present Acute encephalopathy ICD-10-CM: G93.40 ICD-9-CM: 348.30  7/8/2017 - Present Fever ICD-10-CM: R50.9 ICD-9-CM: 780.60  8/31/2014 - Present SIRS (systemic inflammatory response syndrome) (HCC) ICD-10-CM: R65.10 ICD-9-CM: 995.90  8/31/2014 - Present Cough ICD-10-CM: R05 ICD-9-CM: 786.2  8/31/2014 - Present Sepsis(995.91) ICD-10-CM: A41.9 ICD-9-CM: 995.91  6/15/2012 - Present UTI (urinary tract infection) ICD-10-CM: N39.0 ICD-9-CM: 599.0  6/15/2012 - Present Overview Signed 6/17/2012  3:58 PM by Belén Haynes Escherichia coli    >100K col/mL Phenobarbital toxicity ICD-10-CM: T42.3X1A 
ICD-9-CM: 967.0, E980.1  6/15/2012 - Present Cerebral palsy ([address] ICD-10-CM: G80.9 ICD-9-CM: 343.9  6/15/2012 - Present Scoliosis ICD-10-CM: M41.9 ICD-9-CM: 737.30  6/15/2012 - Present Dehydration ICD-10-CM: E86.0 ICD-9-CM: 276.51  6/15/2012 - Present Leukocytosis, unspecified ICD-10-CM: M62.079 ICD-9-CM: 288.60  6/15/2012 - Present Encephalopathy ICD-10-CM: G93.40 ICD-9-CM: 348.30  6/15/2012 - Present DISCHARGE MEDICATIONS:  
 
 
 
· It is important that you take the medication exactly as they are prescribed. · Keep your medication in the bottles provided by the pharmacist and keep a list of the medication names, dosages, and times to be taken in your wallet. · Do not take other medications without consulting your doctor. DIET:  Pureed/nectar ACTIVITY: Activity as tolerated ADDITIONAL INFORMATION: If you experience any of the following symptoms then please call your primary care physician or return to the emergency room if you cannot get hold of your doctor: Fever, chills, nausea, vomiting, diarrhea, change in mentation, falling, bleeding, shortness of breath.  
 
FOLLOW UP CARE: 
Dr. Cayetano Reyes MD  you are to call and set up an appointment to see them with in 1 week. Follow-up with specialists at directed by them Information obtained by : 
I understand that if any problems occur once I am at home I am to contact my physician. I understand and acknowledge receipt of the instructions indicated above. Physician's or R.N.'s Signature                                                                  Date/Time Patient or Representative Signature                                                          Date/Time Discharge Orders None [employer] Announcement We are excited to announce that we are making your provider's discharge notes available to you in [employer]. You will see these notes when they are completed and signed by the physician that discharged you from your recent hospital stay. If you have any questions or concerns about any information you see in [employer], please call the Health Information Department where you were seen or reach out to your Primary Care Provider for more information about your plan of care. Introducing [facility]! [facility] introduces [employer] patient portal. Now you can access parts of your medical record, email your doctor's office, and request medication refills online. 1. In your internet browser, go to [email or web address] 2. Click on the First Time User? Click Here link in the Sign In box. You will see the New Member Sign Up page. 3. Enter your [employer] Access Code exactly as it appears below. You will not need to use this code after youve completed the sign-up process. If you do not sign up before the expiration date, you must request a new code.  
 
· [employer] Access Code: [email or web address] 
 Expires: 10/8/2017 12:25 PM 
 
4. Enter the last four digits of your Social Security Number (xxxx) and Date of Birth (mm/dd/yyyy) as indicated and click Submit. You will be taken to the next sign-up page. 5. Create a Sp[employer] ID. This will be your [employer] login ID and cannot be changed, so think of one that is secure and easy to remember. 6. Create a [employer] password. You can change your password at any time. 7. Enter your Password Reset Question and Answer. This can be used at a later time if you forget your password. 8. Enter your e-mail address. You will receive e-mail notification when new information is available in [address] 9. Click Sign Up. You can now view and download portions of your medical record. 10. Click the Download Summary menu link to download a portable copy of your medical information. If you have questions, please visit the Frequently Asked Questions section of the [employer] website. Remember, [employer] is NOT to be used for urgent needs. For medical emergencies, dial 911. Now available from your iPhone and Android! General Information Please provide this summary of care documentation to your next provider. Patient Signature:  ____________________________________________________________ Date:  ____________________________________________________________  
  
Smithlakeshia Potter Provider Signature:  ____________________________________________________________ Date:  ____________________________________________________________

## 2017-07-09 ENCOUNTER — APPOINTMENT (OUTPATIENT)
Dept: CT IMAGING | Age: 52
DRG: 871 | End: 2017-07-09
Attending: INTERNAL MEDICINE
Payer: MEDICARE

## 2017-07-09 ENCOUNTER — APPOINTMENT (OUTPATIENT)
Dept: GENERAL RADIOLOGY | Age: 52
DRG: 871 | End: 2017-07-09
Attending: INTERNAL MEDICINE
Payer: MEDICARE

## 2017-07-09 LAB
ALBUMIN SERPL BCP-MCNC: 2.4 G/DL (ref 3.5–5)
ALBUMIN/GLOB SERPL: 0.7 {RATIO} (ref 1.1–2.2)
ALP SERPL-CCNC: 88 U/L (ref 45–117)
ALT SERPL-CCNC: 26 U/L (ref 12–78)
ANION GAP BLD CALC-SCNC: 5 MMOL/L (ref 5–15)
AST SERPL W P-5'-P-CCNC: 33 U/L (ref 15–37)
BILIRUB DIRECT SERPL-MCNC: <0.1 MG/DL (ref 0–0.2)
BILIRUB SERPL-MCNC: 0.1 MG/DL (ref 0.2–1)
BUN SERPL-MCNC: 8 MG/DL (ref 6–20)
BUN/CREAT SERPL: 22 (ref 12–20)
CALCIUM SERPL-MCNC: 8.1 MG/DL (ref 8.5–10.1)
CHLORIDE SERPL-SCNC: 106 MMOL/L (ref 97–108)
CO2 SERPL-SCNC: 27 MMOL/L (ref 21–32)
CREAT SERPL-MCNC: 0.37 MG/DL (ref 0.7–1.3)
ERYTHROCYTE [DISTWIDTH] IN BLOOD BY AUTOMATED COUNT: 12.7 % (ref 11.5–14.5)
GLOBULIN SER CALC-MCNC: 3.6 G/DL (ref 2–4)
GLUCOSE SERPL-MCNC: 113 MG/DL (ref 65–100)
HCT VFR BLD AUTO: 30.6 % (ref 36.6–50.3)
HGB BLD-MCNC: 9.9 G/DL (ref 12.1–17)
LACTATE SERPL-SCNC: 0.9 MMOL/L (ref 0.4–2)
MAGNESIUM SERPL-MCNC: 1.4 MG/DL (ref 1.6–2.4)
MCH RBC QN AUTO: 31.9 PG (ref 26–34)
MCHC RBC AUTO-ENTMCNC: 32.4 G/DL (ref 30–36.5)
MCV RBC AUTO: 98.7 FL (ref 80–99)
PHENOBARB SERPL-MCNC: 22.8 UG/ML (ref 15–40)
PHOSPHATE SERPL-MCNC: 1.8 MG/DL (ref 2.6–4.7)
PLATELET # BLD AUTO: 113 K/UL (ref 150–400)
POTASSIUM SERPL-SCNC: 3.3 MMOL/L (ref 3.5–5.1)
PREALB SERPL-MCNC: 14.2 MG/DL (ref 20–40)
PROT SERPL-MCNC: 6 G/DL (ref 6.4–8.2)
RBC # BLD AUTO: 3.1 M/UL (ref 4.1–5.7)
SODIUM SERPL-SCNC: 138 MMOL/L (ref 136–145)
VALPROATE SERPL-MCNC: 98 UG/ML (ref 50–100)
WBC # BLD AUTO: 5.6 K/UL (ref 4.1–11.1)

## 2017-07-09 PROCEDURE — C1758 CATHETER, URETERAL: HCPCS

## 2017-07-09 PROCEDURE — 36415 COLL VENOUS BLD VENIPUNCTURE: CPT | Performed by: INTERNAL MEDICINE

## 2017-07-09 PROCEDURE — 74011250636 HC RX REV CODE- 250/636: Performed by: FAMILY MEDICINE

## 2017-07-09 PROCEDURE — 74011000250 HC RX REV CODE- 250: Performed by: FAMILY MEDICINE

## 2017-07-09 PROCEDURE — C9113 INJ PANTOPRAZOLE SODIUM, VIA: HCPCS | Performed by: INTERNAL MEDICINE

## 2017-07-09 PROCEDURE — 74011636320 HC RX REV CODE- 636/320: Performed by: RADIOLOGY

## 2017-07-09 PROCEDURE — 85027 COMPLETE CBC AUTOMATED: CPT | Performed by: INTERNAL MEDICINE

## 2017-07-09 PROCEDURE — 65660000000 HC RM CCU STEPDOWN

## 2017-07-09 PROCEDURE — 83735 ASSAY OF MAGNESIUM: CPT | Performed by: INTERNAL MEDICINE

## 2017-07-09 PROCEDURE — 84100 ASSAY OF PHOSPHORUS: CPT | Performed by: INTERNAL MEDICINE

## 2017-07-09 PROCEDURE — 74011000258 HC RX REV CODE- 258: Performed by: FAMILY MEDICINE

## 2017-07-09 PROCEDURE — 80076 HEPATIC FUNCTION PANEL: CPT | Performed by: INTERNAL MEDICINE

## 2017-07-09 PROCEDURE — 94667 MNPJ CHEST WALL 1ST: CPT

## 2017-07-09 PROCEDURE — 84134 ASSAY OF PREALBUMIN: CPT | Performed by: INTERNAL MEDICINE

## 2017-07-09 PROCEDURE — 74011000258 HC RX REV CODE- 258: Performed by: INTERNAL MEDICINE

## 2017-07-09 PROCEDURE — 71260 CT THORAX DX C+: CPT

## 2017-07-09 PROCEDURE — 80164 ASSAY DIPROPYLACETIC ACD TOT: CPT | Performed by: INTERNAL MEDICINE

## 2017-07-09 PROCEDURE — 74011250636 HC RX REV CODE- 250/636: Performed by: INTERNAL MEDICINE

## 2017-07-09 PROCEDURE — 80048 BASIC METABOLIC PNL TOTAL CA: CPT | Performed by: INTERNAL MEDICINE

## 2017-07-09 PROCEDURE — 77030010547 HC BG URIN W/UMETER -A

## 2017-07-09 PROCEDURE — 71010 XR CHEST PORT: CPT

## 2017-07-09 PROCEDURE — 83605 ASSAY OF LACTIC ACID: CPT | Performed by: INTERNAL MEDICINE

## 2017-07-09 PROCEDURE — 74011000250 HC RX REV CODE- 250: Performed by: INTERNAL MEDICINE

## 2017-07-09 PROCEDURE — 94640 AIRWAY INHALATION TREATMENT: CPT

## 2017-07-09 PROCEDURE — 80184 ASSAY OF PHENOBARBITAL: CPT | Performed by: INTERNAL MEDICINE

## 2017-07-09 RX ORDER — LEVALBUTEROL INHALATION SOLUTION 1.25 MG/3ML
1.25 SOLUTION RESPIRATORY (INHALATION)
Status: DISCONTINUED | OUTPATIENT
Start: 2017-07-09 | End: 2017-07-16 | Stop reason: HOSPADM

## 2017-07-09 RX ORDER — ACETYLCYSTEINE 200 MG/ML
2 SOLUTION ORAL; RESPIRATORY (INHALATION)
Status: DISCONTINUED | OUTPATIENT
Start: 2017-07-09 | End: 2017-07-16 | Stop reason: HOSPADM

## 2017-07-09 RX ORDER — POTASSIUM CHLORIDE 7.45 MG/ML
10 INJECTION INTRAVENOUS
Status: DISPENSED | OUTPATIENT
Start: 2017-07-09 | End: 2017-07-09

## 2017-07-09 RX ORDER — POTASSIUM CHLORIDE 7.45 MG/ML
10 INJECTION INTRAVENOUS ONCE
Status: COMPLETED | OUTPATIENT
Start: 2017-07-09 | End: 2017-07-10

## 2017-07-09 RX ORDER — FUROSEMIDE 10 MG/ML
10 INJECTION INTRAMUSCULAR; INTRAVENOUS ONCE
Status: COMPLETED | OUTPATIENT
Start: 2017-07-09 | End: 2017-07-09

## 2017-07-09 RX ORDER — MAGNESIUM SULFATE 1 G/100ML
1 INJECTION INTRAVENOUS ONCE
Status: COMPLETED | OUTPATIENT
Start: 2017-07-09 | End: 2017-07-10

## 2017-07-09 RX ADMIN — POTASSIUM CHLORIDE 10 MEQ: 10 INJECTION, SOLUTION INTRAVENOUS at 12:10

## 2017-07-09 RX ADMIN — PIPERACILLIN SODIUM,TAZOBACTAM SODIUM 3.38 G: 3; .375 INJECTION, POWDER, FOR SOLUTION INTRAVENOUS at 16:41

## 2017-07-09 RX ADMIN — IOPAMIDOL 93 ML: 755 INJECTION, SOLUTION INTRAVENOUS at 09:00

## 2017-07-09 RX ADMIN — SODIUM CHLORIDE 40 MG: 9 INJECTION INTRAMUSCULAR; INTRAVENOUS; SUBCUTANEOUS at 10:37

## 2017-07-09 RX ADMIN — VALPROATE SODIUM 750 MG: 100 INJECTION, SOLUTION INTRAVENOUS at 21:07

## 2017-07-09 RX ADMIN — Medication 10 ML: at 16:54

## 2017-07-09 RX ADMIN — PIPERACILLIN SODIUM,TAZOBACTAM SODIUM 3.38 G: 3; .375 INJECTION, POWDER, FOR SOLUTION INTRAVENOUS at 23:19

## 2017-07-09 RX ADMIN — Medication 10 ML: at 21:07

## 2017-07-09 RX ADMIN — PIPERACILLIN SODIUM,TAZOBACTAM SODIUM 3.38 G: 3; .375 INJECTION, POWDER, FOR SOLUTION INTRAVENOUS at 00:35

## 2017-07-09 RX ADMIN — SODIUM CHLORIDE 250 ML: 900 INJECTION, SOLUTION INTRAVENOUS at 01:23

## 2017-07-09 RX ADMIN — PHENOBARBITAL SODIUM 30 MG: 65 INJECTION INTRAMUSCULAR; INTRAVENOUS at 08:29

## 2017-07-09 RX ADMIN — ENOXAPARIN SODIUM 30 MG: 30 INJECTION SUBCUTANEOUS at 21:11

## 2017-07-09 RX ADMIN — FUROSEMIDE 10 MG: 10 INJECTION, SOLUTION INTRAMUSCULAR; INTRAVENOUS at 23:13

## 2017-07-09 RX ADMIN — LEVALBUTEROL INHALATION 1.25MG/3ML 1.25 MG: 1.25 SOLUTION RESPIRATORY (INHALATION) at 19:56

## 2017-07-09 RX ADMIN — PIPERACILLIN SODIUM,TAZOBACTAM SODIUM 3.38 G: 3; .375 INJECTION, POWDER, FOR SOLUTION INTRAVENOUS at 08:25

## 2017-07-09 RX ADMIN — AZITHROMYCIN MONOHYDRATE 500 MG: 500 INJECTION, POWDER, LYOPHILIZED, FOR SOLUTION INTRAVENOUS at 16:40

## 2017-07-09 RX ADMIN — VALPROATE SODIUM 625 MG: 100 INJECTION, SOLUTION INTRAVENOUS at 08:29

## 2017-07-09 RX ADMIN — DIPHENHYDRAMINE HYDROCHLORIDE 12.5 MG: 50 INJECTION, SOLUTION INTRAMUSCULAR; INTRAVENOUS at 21:13

## 2017-07-09 RX ADMIN — DEXTROSE MONOHYDRATE AND SODIUM CHLORIDE 100 ML/HR: 5; .9 INJECTION, SOLUTION INTRAVENOUS at 13:16

## 2017-07-09 RX ADMIN — ACETYLCYSTEINE 400 MG: 200 SOLUTION ORAL; RESPIRATORY (INHALATION) at 19:59

## 2017-07-09 RX ADMIN — MAGNESIUM SULFATE HEPTAHYDRATE 1 G: 1 INJECTION, SOLUTION INTRAVENOUS at 10:38

## 2017-07-09 RX ADMIN — POTASSIUM CHLORIDE 10 MEQ: 10 INJECTION, SOLUTION INTRAVENOUS at 13:14

## 2017-07-09 NOTE — PROGRESS NOTES
Bedside and Verbal shift change report given to Starr Thompson RN (oncoming nurse) by Roxana Graf RN (offgoing nurse). Report included the following information SBAR, Kardex, ED Summary, Procedure Summary, Intake/Output, MAR and Recent Results. 2000 Pt needs suctioning and a sputum sample. RT to bedside to collect sample and suction patient of moderate thick tan mucous. 0120 Pt MAP less than 65. Paged Dr. Juanito Max and updated MD on patient using SBAR format. Per MD administer a 250c NS bolus. Will continue to monitor.

## 2017-07-09 NOTE — PROGRESS NOTES
0700  Bedside and Verbal shift change report given to Richelle Dukes RN  (oncoming nurse) by Manoj Hammond RN  (offgoing nurse). Report included the following information SBAR, MAR and Recent Results. 1900  Pt had an uneventful shift. His level of care is now telemetry. VSS. Bedside and Verbal shift change report given to Geri Monsivais RN (oncoming nurse) by Richelle Dukes RN  (offgoing nurse). Report included the following information SBAR, Kardex and Recent Results.

## 2017-07-09 NOTE — CONSULTS
[facility]     Name: Tatianna Kiser MRN: [id number]   : 1965 Hospital: [address]   Date: 2017        Impression Plan   1. Acute respiratory failure  2. Hypoxia  3. Bronchitis vs. PNA  4. Hx of Cerebral palsy  5. Hx of aspiration PNA  6. Hx of seizure disorder               · Continue Zosyn and azithromycin  · CT chest to further evaluate for PNA  · CT abdomen  · Sputum cx  · Phenobarbital level pending  · Follow up Blood cx  · NPO until more awake  · Enox proph  · PPI       Radiology  ( personally reviewed) CXR reviewed: Contorted, no infiltrates noted   ABG No results for input(s): PHI, PO2I, PCO2I in the last 72 hours. Subjective     This patient has been seen and evaluated at the request of Dr. Paola Cohen for PNA . Patient is a 47 y.o. male with PMHx of CP, seizure disorder presenting with increased cough, sputum production and fevers at home. Pt is non-verbal. Pt has a hx of aspiration in the past. CXR negative for infiltrates. Afebrile since admission. Review of Systems:  Review of systems not obtained due to patient factors. Past Medical History:   Diagnosis Date    Aspiration pneumonia ([address]     Cerebral palsy (HCC)     Ill-defined condition     dsyphasia    Scoliosis     Seizure disorder ([address]     Seizures ([facility]       Past Surgical History:   Procedure Laterality Date    HX HEENT        Prior to Admission medications    Medication Sig Start Date End Date Taking? Authorizing Provider   ascorbic acid, vitamin C, powd Take 1,000 mg by mouth daily. Mix with liquid prior to administration. Yes Historical Provider   omeprazole (PRILOSEC) 20 mg capsule Take 20 mg by mouth Before breakfast and dinner. Yes Historical Provider   PHENobarbital (LUMINAL) 30 mg tablet Take 30 mg by mouth two (2) times a day. Yes Historical Provider   divalproex  (DEPAKOTE) 125 mg tablet Take 125 mg by mouth daily.    Yes Historical Provider   divalproex DR (DEPAKOTE) 125 mg tablet Take 250 mg by mouth every evening. Yes Historical Provider   guaiFENesin-dextromethorphan (G-FENESIN DM)  mg tab tablet Take 1 Tab by mouth every four (4) hours as needed. Yes Historical Provider   acetaminophen (TYLENOL) 500 mg tablet Take 1,000 mg by mouth every six (6) hours as needed for Pain. Yes Historical Provider   DOCUSATE CALCIUM (STOOL SOFTENER PO) Take 1 tablet by mouth two (2) times daily as needed. Yes Historical Provider   divalproex DR (DEPAKOTE) 500 mg tablet Take 1 Tab by mouth two (2) times a day. 4/23/14  Yes Perez Baxter NP   triamcinolone acetonide (KENALOG) 0.1 % topical cream Apply  to affected area two (2) times daily as needed for Skin Irritation.  use thin layer    Historical Provider     Current Facility-Administered Medications   Medication Dose Route Frequency    iopamidol (ISOVUE-370) 76 % injection 100 mL  100 mL IntraVENous RAD ONCE    dextrose 5% and 0.9% NaCl infusion  100 mL/hr IntraVENous CONTINUOUS    piperacillin-tazobactam (ZOSYN) 3.375 g in 0.9% sodium chloride (MBP/ADV) 100 mL  3.375 g IntraVENous Q8H    azithromycin (ZITHROMAX) 500 mg in 0.9% sodium chloride (MBP/ADV) 250 mL  500 mg IntraVENous Q24H    ascorbic acid (vitamin C) (VITAMIN C) tablet 500 mg  500 mg Oral BID    pantoprazole (PROTONIX) tablet 40 mg  40 mg Oral ACB&D    sodium chloride (NS) flush 5-10 mL  5-10 mL IntraVENous Q8H    docusate sodium (COLACE) capsule 100 mg  100 mg Oral BID    enoxaparin (LOVENOX) injection 30 mg  30 mg SubCUTAneous Q24H    diphenhydrAMINE (BENADRYL) injection 12.5 mg  12.5 mg IntraVENous QHS    PHENobarbital (LUMINAL) injection 30 mg  30 mg IntraVENous Q12H    valproate (DEPACON) 750 mg in 0.9% sodium chloride 50 mL IVPB  750 mg IntraVENous Q24H    valproate (DEPACON) 625 mg in 0.9% sodium chloride 50 mL IVPB  625 mg IntraVENous Q24H     Allergies   Allergen Reactions    Other Medication Swelling     Mother states the patient had a reaction to an antibiotic as a child  She cannot recall the name of the antibiotic nor the indication   Per [facility] [phone] the patient has had prescriptions for Augmentin  Tolerates Cefdinir. Social History   Substance Use Topics    Smoking status: Never Smoker    Smokeless tobacco: Never Used    Alcohol use No      Family History   Problem Relation Age of Onset    Cancer Father      prostate    Cancer Maternal Aunt     Heart Disease Maternal Aunt     Cancer Maternal Grandmother     Cancer Maternal Grandfather     Cancer Paternal Grandmother     Cancer Paternal Grandfather     Cancer Other           Laboratory: I have personally reviewed the critical care flowsheet and labs.      Recent Labs      07/09/17   0400  07/08/17   1422   WBC  5.6  7.7   HGB  9.9*  12.0*   HCT  30.6*  36.7   PLT  113*  164     Recent Labs      07/09/17   0400  07/08/17   1422   NA  138  135*   K  3.3*  3.8   CL  106  96*   CO2  27  29   GLU  113*  99   BUN  8  23*   CREA  0.37*  0.96   CA  8.1*  8.5   MG  1.4*   --    PHOS  1.8*   --    ALB  2.4*  3.4*   SGOT  33  39*   ALT  26  31       Objective:     Mode Rate Tidal Volume Pressure FiO2 PEEP                    Vital Signs:     TMAX(24)      Intake/Output:   Last shift:         Last 3 shifts:  [id number]  Intake/Output Summary (Last 24 hours) at 07/09/17 0858  Last data filed at 07/09/17 0159   Gross per 24 hour   Intake          1133.33 ml   Output                0 ml   Net          1133.33 ml     EXAM:   GENERAL: well developed and in no distress, contracted, HEENT:  PERRL, EOMI, no alar flaring or epistaxis, oral mucosa moist without cyanosis, NECK:  no jugular vein distention, no retractions, no thyromegaly or masses, LUNGS: CTA, no w/r/r , HEART:  Regular rate and rhythm with no MGR; no edema is present, ABDOMEN:  soft with no tenderness, bowel sounds present, EXTREMITIES:  warm with no cyanosis, atrophied SKIN:  no jaundice or ecchymosis and NEUROLOGIC:  Sleepy and grimaces only to stimuli, protecting airway    Farnaz Weathers MD  [facility]

## 2017-07-09 NOTE — PROGRESS NOTES
1900: Bedside shift change report given to Tamra Grover, RN (oncoming nurse) by Michel Jackson RN (offgoing nurse). Report included the following information SBAR, Kardex, ED Summary, Intake/Output, MAR and Cardiac Rhythm NSR/ST.     1922: Full head to toe assessment and vital signs completed. Patient resting in bed. Non-verbal. Moans and grunts. Follows commands when asked to squeeze hands. 2141: Respiratory at bedside to suction patient due to him de-sating into mid 80's. When suctioning, bright red blood came back. Paged Dr. Melvina No and he ordered to speak with pulmonary. 2144: Paged Dr. Mary Lopez from pulmonary. Waiting on call back. 2205: Spoke with Dr. Mary Lopez from pulmonary. Told to order a chest xray stat. 2243: Paged Dr. Mary Lopez about chest xray results. 10 mg lasix ordered, fluids discontinued, external condom cath     0005: Reassessment and vital signs completed. Vital signs stable. Patient resting in bed with non-rebreather on. 0200: Patient resting in bed. Vital signs stable. 0400: Reassessment and vital signs completed. Vital signs stable. Patient resting in bed with non-rebreather on. Respiratory therapy will switch patient to venturi mask to see how pt tolerates it. 0715: Bedside shift change report given to Mirela Maria RN (oncoming nurse) by Lowell Michele RN (offgoing nurse). Report included the following information SBAR, Kardex, ED Summary, Procedure Summary, Intake/Output, MAR and Cardiac Rhythm Sinus Rhythm/Sinus Tach.

## 2017-07-09 NOTE — PROGRESS NOTES
Daily Progress Note: 7/9/2017  Alessia Hinojosa MD    Assessment/Plan:   1) Sepsis/possible aspiration PNA: 2/4 SIRS on admission, clinically has pneumonia despite negative CXR. High risks for aspiration. Failed outpatient Abx. Check sputum Cx, blood Cx. Empirically on IV Zosyn, Azithro (for atypical)     2) Acute encephalopathy: more awake at baseline per mom, but now more lethargic. Likely due to sepsis. depakote and phenobarb levels. Monitor closely     3) Hypotension: due to sepsis. Slowly improving with IVF. Will monitor closely     4) Dysphagia/severe protein-jesus malnutrition: has severe muscle wasting. Will keep NPO due to aspiration PNA.   Consult speech, nutrition     5) Cerebral palsy/seizure d/o: cont depakote, phenobarb        Problem List:  Problem List as of 7/9/2017  Date Reviewed: 7/8/2017          Codes Class Noted - Resolved    * (Principal)Sepsis ([facility].) ICD-10-CM: A41.9  ICD-9-CM: 038.9, 995.91  7/8/2017 - Present        Aspiration pneumonia ([facility] ICD-10-CM: J69.0  ICD-9-CM: 507.0  7/8/2017 - Present        Hypotension ICD-10-CM: I95.9  ICD-9-CM: 458.9  7/8/2017 - Present        Acute encephalopathy ICD-10-CM: G93.40  ICD-9-CM: 348.30  7/8/2017 - Present        Fever ICD-10-CM: R50.9  ICD-9-CM: 780.60  8/31/2014 - Present        SIRS (systemic inflammatory response syndrome) ([facility] ICD-10-CM: R65.10  ICD-9-CM: 995.90  8/31/2014 - Present        Cough ICD-10-CM: R05  ICD-9-CM: 786.2  8/31/2014 - Present        Sepsis(995.91) ICD-10-CM: A41.9  ICD-9-CM: 995.91  6/15/2012 - Present        UTI (urinary tract infection) ICD-10-CM: N39.0  ICD-9-CM: 599.0  6/15/2012 - Present    Overview Signed 6/17/2012  3:58 PM by [facility]     Escherichia coli    >100K col/mL             Phenobarbital toxicity ICD-10-CM: T42.3X1A  ICD-9-CM: 967.0, E980.1  6/15/2012 - Present        Cerebral palsy ([facility] ICD-10-CM: G80.9  ICD-9-CM: 343.9  6/15/2012 - Present        Scoliosis ICD-10-CM: M41.9  ICD-9-CM: 737.30  6/15/2012 - Present        Dehydration ICD-10-CM: E86.0  ICD-9-CM: 276.51  6/15/2012 - Present        Leukocytosis, unspecified ICD-10-CM: D72.829  ICD-9-CM: 288.60  6/15/2012 - Present        Encephalopathy ICD-10-CM: G93.40  ICD-9-CM: 348.30  6/15/2012 - Present              Subjective:    46 yo hx of cerebral palsy, bedbound, seizure d/o, presented w/ fever, sepsis, possible aspiration pneumonia. The patient cannot give a history. His mother, who is the POA/care taker, stated that the patient has had a worsening productive cough for several weeks, associated with dyspnea, fevers, chills. He has a hx of frequent aspiration pneumonia. The patient recently saw his PCP who prescribed cefdinir for 10 days, which helped his symptoms, but now worsened again. In the ED, temp was 100. WBC 7.7. SBP ~80s. CXR unremarkable    : Moves spontaneously. Coughs episodically. On IV Zosyn and Azith. CT chest/Ab planned for today. Cont NPO until more awake. K+./Mg+ a little low - replace. Review of Systems:   Review of systems not obtained due to patient factors. Objective:   Physical Exam:     Visit Vitals    BP 90/58    Pulse 91    Temp 98.9 °F (37.2 °C)    Resp 14    Ht 5' 7\" (1.702 m)    Wt 38.4 kg (84 lb 10.5 oz)    SpO2 97%    BMI 13.26 kg/m2      O2 Device: Room air    Temp (24hrs), Av.2 °F (37.3 °C), Min:98.9 °F (37.2 °C), Max:99.9 °F (37.7 °C)         1901 -  0700  In: 1133.3 [I.V.:1133.3]  Out: -     General:  Chronically ill-appearing, cachectic, mild distress   Head:  Normocephalic, without obvious abnormality, atraumatic. Eyes:  Conjunctivae/corneas clear. EOMs intact. Throat: Lips, mucosa, and tongue moist..   Neck: Supple, symmetrical, trachea midline, no adenopathy, thyroid: no enlargement/tenderness/nodules, no carotid bruit and no JVD.    Lungs:   Clear to auscultation bilaterally except scattered rhonchi/upper airway noise which clear after cough.   Chest wall:  No tenderness or deformity. Heart:  Regular rate and rhythm, S1, S2 normal, no murmur, click, rub or gallop. Abdomen:   Soft, non-tender. Bowel sounds normal. No masses,  No organomegaly. Extremities: Contractures unchanged. no cyanosis or edema. No apparent calf tenderness or cords. Pulses: 2+ and symmetric all extremities. Skin:  turgor normal.    Neurologic: Non-verbal.  Responds to painful stimuli. Contractures unchanged. Data Review:       Recent Days:  Recent Labs      07/09/17   0400  07/08/17   1422   WBC  5.6  7.7   HGB  9.9*  12.0*   HCT  30.6*  36.7   PLT  113*  164     Recent Labs      07/09/17   0400  07/08/17   1422   NA  138  135*   K  3.3*  3.8   CL  106  96*   CO2  27  29   GLU  113*  99   BUN  8  23*   CREA  0.37*  0.96   CA  8.1*  8.5   MG  1.4*   --    PHOS  1.8*   --    ALB  2.4*  3.4*   TBILI  0.1*  0.2   SGOT  33  39*   ALT  26  31     No results for input(s): PH, PCO2, PO2, HCO3, FIO2 in the last 72 hours. 24 Hour Results:  Recent Results (from the past 24 hour(s))   CBC WITH AUTOMATED DIFF    Collection Time: 07/08/17  2:22 PM   Result Value Ref Range    WBC 7.7 4.1 - 11.1 K/uL    RBC 3.74 (L) 4.10 - 5.70 M/uL    HGB 12.0 (L) 12.1 - 17.0 g/dL    HCT 36.7 36.6 - 50.3 %    MCV 98.1 80.0 - 99.0 FL    MCH 32.1 26.0 - 34.0 PG    MCHC 32.7 30.0 - 36.5 g/dL    RDW 12.8 11.5 - 14.5 %    PLATELET 697 387 - 383 K/uL    NEUTROPHILS 74 32 - 75 %    BAND NEUTROPHILS 10 %    LYMPHOCYTES 6 (L) 12 - 49 %    MONOCYTES 10 5 - 13 %    EOSINOPHILS 0 0 - 7 %    BASOPHILS 0 0 - 1 %    ABS. NEUTROPHILS 6.4 1.8 - 8.0 K/UL    ABS. LYMPHOCYTES 0.5 (L) 0.8 - 3.5 K/UL    ABS. MONOCYTES 0.8 0.0 - 1.0 K/UL    ABS. EOSINOPHILS 0.0 0.0 - 0.4 K/UL    ABS.  BASOPHILS 0.0 0.0 - 0.1 K/UL    DF MANUAL      RBC COMMENTS NORMOCYTIC, NORMOCHROMIC     METABOLIC PANEL, COMPREHENSIVE    Collection Time: 07/08/17  2:22 PM   Result Value Ref Range    Sodium 135 (L) 136 - 145 mmol/L Potassium 3.8 3.5 - 5.1 mmol/L    Chloride 96 (L) 97 - 108 mmol/L    CO2 29 21 - 32 mmol/L    Anion gap 10 5 - 15 mmol/L    Glucose 99 65 - 100 mg/dL    BUN 23 (H) 6 - 20 MG/DL    Creatinine 0.96 0.70 - 1.30 MG/DL    BUN/Creatinine ratio 24 (H) 12 - 20      GFR est AA >60 >60 ml/min/1.73m2    GFR est non-AA >60 >60 ml/min/1.73m2    Calcium 8.5 8.5 - 10.1 MG/DL    Bilirubin, total 0.2 0.2 - 1.0 MG/DL    ALT (SGPT) 31 12 - 78 U/L    AST (SGOT) 39 (H) 15 - 37 U/L    Alk.  phosphatase 121 (H) 45 - 117 U/L    Protein, total 7.8 6.4 - 8.2 g/dL    Albumin 3.4 (L) 3.5 - 5.0 g/dL    Globulin 4.4 (H) 2.0 - 4.0 g/dL    A-G Ratio 0.8 (L) 1.1 - 2.2     LACTIC ACID    Collection Time: 07/08/17  2:23 PM   Result Value Ref Range    Lactic acid 2.1 (HH) 0.4 - 2.0 MMOL/L   CULTURE, BLOOD    Collection Time: 07/08/17  2:23 PM   Result Value Ref Range    Special Requests: NO SPECIAL REQUESTS      Culture result: NO GROWTH AFTER 9 HOURS     CULTURE, BLOOD    Collection Time: 07/08/17  3:08 PM   Result Value Ref Range    Special Requests: NO SPECIAL REQUESTS      Culture result: NO GROWTH AFTER 15 HOURS     METABOLIC PANEL, BASIC    Collection Time: 07/09/17  4:00 AM   Result Value Ref Range    Sodium 138 136 - 145 mmol/L    Potassium 3.3 (L) 3.5 - 5.1 mmol/L    Chloride 106 97 - 108 mmol/L    CO2 27 21 - 32 mmol/L    Anion gap 5 5 - 15 mmol/L    Glucose 113 (H) 65 - 100 mg/dL    BUN 8 6 - 20 MG/DL    Creatinine 0.37 (L) 0.70 - 1.30 MG/DL    BUN/Creatinine ratio 22 (H) 12 - 20      GFR est AA >60 >60 ml/min/1.73m2    GFR est non-AA >60 >60 ml/min/1.73m2    Calcium 8.1 (L) 8.5 - 10.1 MG/DL   CBC W/O DIFF    Collection Time: 07/09/17  4:00 AM   Result Value Ref Range    WBC 5.6 4.1 - 11.1 K/uL    RBC 3.10 (L) 4.10 - 5.70 M/uL    HGB 9.9 (L) 12.1 - 17.0 g/dL    HCT 30.6 (L) 36.6 - 50.3 %    MCV 98.7 80.0 - 99.0 FL    MCH 31.9 26.0 - 34.0 PG    MCHC 32.4 30.0 - 36.5 g/dL    RDW 12.7 11.5 - 14.5 %    PLATELET 263 (L) 530 - 400 K/uL   MAGNESIUM Collection Time: 07/09/17  4:00 AM   Result Value Ref Range    Magnesium 1.4 (L) 1.6 - 2.4 mg/dL   PHOSPHORUS    Collection Time: 07/09/17  4:00 AM   Result Value Ref Range    Phosphorus 1.8 (L) 2.6 - 4.7 MG/DL   HEPATIC FUNCTION PANEL    Collection Time: 07/09/17  4:00 AM   Result Value Ref Range    Protein, total 6.0 (L) 6.4 - 8.2 g/dL    Albumin 2.4 (L) 3.5 - 5.0 g/dL    Globulin 3.6 2.0 - 4.0 g/dL    A-G Ratio 0.7 (L) 1.1 - 2.2      Bilirubin, total 0.1 (L) 0.2 - 1.0 MG/DL    Bilirubin, direct <0.1 0.0 - 0.2 MG/DL    Alk.  phosphatase 88 45 - 117 U/L    AST (SGOT) 33 15 - 37 U/L    ALT (SGPT) 26 12 - 78 U/L   LACTIC ACID    Collection Time: 07/09/17  5:36 AM   Result Value Ref Range    Lactic acid 0.9 0.4 - 2.0 MMOL/L       Medications reviewed  Current Facility-Administered Medications   Medication Dose Route Frequency    iopamidol (ISOVUE-370) 76 % injection 100 mL  100 mL IntraVENous RAD ONCE    dextrose 5% and 0.9% NaCl infusion  100 mL/hr IntraVENous CONTINUOUS    albuterol-ipratropium (DUO-NEB) 2.5 MG-0.5 MG/3 ML  3 mL Nebulization Q4H PRN    piperacillin-tazobactam (ZOSYN) 3.375 g in 0.9% sodium chloride (MBP/ADV) 100 mL  3.375 g IntraVENous Q8H    azithromycin (ZITHROMAX) 500 mg in 0.9% sodium chloride (MBP/ADV) 250 mL  500 mg IntraVENous Q24H    ascorbic acid (vitamin C) (VITAMIN C) tablet 500 mg  500 mg Oral BID    pantoprazole (PROTONIX) tablet 40 mg  40 mg Oral ACB&D    polyethylene glycol (MIRALAX) packet 17 g  17 g Oral DAILY PRN    sodium chloride (NS) flush 5-10 mL  5-10 mL IntraVENous Q8H    sodium chloride (NS) flush 5-10 mL  5-10 mL IntraVENous PRN    HYDROmorphone (PF) (DILAUDID) injection 0.2 mg  0.2 mg IntraVENous Q4H PRN    naloxone (NARCAN) injection 0.4 mg  0.4 mg IntraVENous PRN    diphenhydrAMINE (BENADRYL) injection 12.5 mg  12.5 mg IntraVENous Q4H PRN    ondansetron (ZOFRAN) injection 4 mg  4 mg IntraVENous Q6H PRN    docusate sodium (COLACE) capsule 100 mg  100 mg Oral BID    acetaminophen (TYLENOL) suppository 650 mg  650 mg Rectal Q4H PRN    enoxaparin (LOVENOX) injection 30 mg  30 mg SubCUTAneous Q24H    diphenhydrAMINE (BENADRYL) injection 12.5 mg  12.5 mg IntraVENous QHS    PHENobarbital (LUMINAL) injection 30 mg  30 mg IntraVENous Q12H    valproate (DEPACON) 750 mg in 0.9% sodium chloride 50 mL IVPB  750 mg IntraVENous Q24H    valproate (DEPACON) 625 mg in 0.9% sodium chloride 50 mL IVPB  625 mg IntraVENous Q24H       Care Plan discussed with: Patient/Family and Nurse    Total time spent with patient: 30 minutes.     Lee Ann Rivas MD

## 2017-07-10 ENCOUNTER — APPOINTMENT (OUTPATIENT)
Dept: GENERAL RADIOLOGY | Age: 52
DRG: 871 | End: 2017-07-10
Attending: INTERNAL MEDICINE
Payer: MEDICARE

## 2017-07-10 LAB
ALBUMIN SERPL BCP-MCNC: 2.6 G/DL (ref 3.5–5)
ALBUMIN/GLOB SERPL: 0.6 {RATIO} (ref 1.1–2.2)
ALP SERPL-CCNC: 97 U/L (ref 45–117)
ALT SERPL-CCNC: 31 U/L (ref 12–78)
ANION GAP BLD CALC-SCNC: 6 MMOL/L (ref 5–15)
AST SERPL W P-5'-P-CCNC: 32 U/L (ref 15–37)
BASOPHILS # BLD AUTO: 0 K/UL (ref 0–0.1)
BASOPHILS # BLD: 0 % (ref 0–1)
BILIRUB SERPL-MCNC: 0.2 MG/DL (ref 0.2–1)
BUN SERPL-MCNC: 5 MG/DL (ref 6–20)
BUN/CREAT SERPL: 13 (ref 12–20)
CALCIUM SERPL-MCNC: 8.1 MG/DL (ref 8.5–10.1)
CHLORIDE SERPL-SCNC: 99 MMOL/L (ref 97–108)
CO2 SERPL-SCNC: 31 MMOL/L (ref 21–32)
CREAT SERPL-MCNC: 0.39 MG/DL (ref 0.7–1.3)
EOSINOPHIL # BLD: 0 K/UL (ref 0–0.4)
EOSINOPHIL NFR BLD: 0 % (ref 0–7)
ERYTHROCYTE [DISTWIDTH] IN BLOOD BY AUTOMATED COUNT: 12.3 % (ref 11.5–14.5)
GLOBULIN SER CALC-MCNC: 4.1 G/DL (ref 2–4)
GLUCOSE SERPL-MCNC: 106 MG/DL (ref 65–100)
HCT VFR BLD AUTO: 30.3 % (ref 36.6–50.3)
HGB BLD-MCNC: 10.3 G/DL (ref 12.1–17)
LYMPHOCYTES # BLD AUTO: 4 % (ref 12–49)
LYMPHOCYTES # BLD: 0.3 K/UL (ref 0.8–3.5)
MAGNESIUM SERPL-MCNC: 1.3 MG/DL (ref 1.6–2.4)
MCH RBC QN AUTO: 32.4 PG (ref 26–34)
MCHC RBC AUTO-ENTMCNC: 34 G/DL (ref 30–36.5)
MCV RBC AUTO: 95.3 FL (ref 80–99)
MONOCYTES # BLD: 0.4 K/UL (ref 0–1)
MONOCYTES NFR BLD AUTO: 6 % (ref 5–13)
NEUTS SEG # BLD: 5.6 K/UL (ref 1.8–8)
NEUTS SEG NFR BLD AUTO: 90 % (ref 32–75)
PLATELET # BLD AUTO: 118 K/UL (ref 150–400)
POTASSIUM SERPL-SCNC: 3.1 MMOL/L (ref 3.5–5.1)
PROT SERPL-MCNC: 6.7 G/DL (ref 6.4–8.2)
RBC # BLD AUTO: 3.18 M/UL (ref 4.1–5.7)
SODIUM SERPL-SCNC: 136 MMOL/L (ref 136–145)
WBC # BLD AUTO: 6.3 K/UL (ref 4.1–11.1)

## 2017-07-10 PROCEDURE — 74011000250 HC RX REV CODE- 250: Performed by: INTERNAL MEDICINE

## 2017-07-10 PROCEDURE — 65660000000 HC RM CCU STEPDOWN

## 2017-07-10 PROCEDURE — 80053 COMPREHEN METABOLIC PANEL: CPT | Performed by: FAMILY MEDICINE

## 2017-07-10 PROCEDURE — 74011000250 HC RX REV CODE- 250: Performed by: FAMILY MEDICINE

## 2017-07-10 PROCEDURE — 85025 COMPLETE CBC W/AUTO DIFF WBC: CPT | Performed by: FAMILY MEDICINE

## 2017-07-10 PROCEDURE — 74011250636 HC RX REV CODE- 250/636: Performed by: FAMILY MEDICINE

## 2017-07-10 PROCEDURE — 74011250636 HC RX REV CODE- 250/636: Performed by: INTERNAL MEDICINE

## 2017-07-10 PROCEDURE — 83735 ASSAY OF MAGNESIUM: CPT | Performed by: FAMILY MEDICINE

## 2017-07-10 PROCEDURE — 94640 AIRWAY INHALATION TREATMENT: CPT

## 2017-07-10 PROCEDURE — 97530 THERAPEUTIC ACTIVITIES: CPT

## 2017-07-10 PROCEDURE — C9113 INJ PANTOPRAZOLE SODIUM, VIA: HCPCS | Performed by: INTERNAL MEDICINE

## 2017-07-10 PROCEDURE — 77010033678 HC OXYGEN DAILY

## 2017-07-10 PROCEDURE — 74011000258 HC RX REV CODE- 258: Performed by: FAMILY MEDICINE

## 2017-07-10 PROCEDURE — 71010 XR CHEST PORT: CPT

## 2017-07-10 PROCEDURE — 94668 MNPJ CHEST WALL SBSQ: CPT

## 2017-07-10 PROCEDURE — 36415 COLL VENOUS BLD VENIPUNCTURE: CPT | Performed by: FAMILY MEDICINE

## 2017-07-10 PROCEDURE — 97162 PT EVAL MOD COMPLEX 30 MIN: CPT

## 2017-07-10 RX ORDER — MAGNESIUM SULFATE HEPTAHYDRATE 40 MG/ML
2 INJECTION, SOLUTION INTRAVENOUS ONCE
Status: COMPLETED | OUTPATIENT
Start: 2017-07-10 | End: 2017-07-10

## 2017-07-10 RX ORDER — POTASSIUM CHLORIDE 7.45 MG/ML
10 INJECTION INTRAVENOUS
Status: DISCONTINUED | OUTPATIENT
Start: 2017-07-10 | End: 2017-07-10

## 2017-07-10 RX ORDER — POTASSIUM CHLORIDE 7.45 MG/ML
10 INJECTION INTRAVENOUS
Status: COMPLETED | OUTPATIENT
Start: 2017-07-10 | End: 2017-07-10

## 2017-07-10 RX ORDER — HEPARIN SODIUM 5000 [USP'U]/ML
5000 INJECTION, SOLUTION INTRAVENOUS; SUBCUTANEOUS EVERY 12 HOURS
Status: DISCONTINUED | OUTPATIENT
Start: 2017-07-10 | End: 2017-07-16 | Stop reason: HOSPADM

## 2017-07-10 RX ADMIN — ACETYLCYSTEINE 400 MG: 200 SOLUTION ORAL; RESPIRATORY (INHALATION) at 20:40

## 2017-07-10 RX ADMIN — DIPHENHYDRAMINE HYDROCHLORIDE 12.5 MG: 50 INJECTION, SOLUTION INTRAMUSCULAR; INTRAVENOUS at 21:34

## 2017-07-10 RX ADMIN — VALPROATE SODIUM 625 MG: 100 INJECTION, SOLUTION INTRAVENOUS at 09:20

## 2017-07-10 RX ADMIN — VALPROATE SODIUM 750 MG: 100 INJECTION, SOLUTION INTRAVENOUS at 21:38

## 2017-07-10 RX ADMIN — POTASSIUM CHLORIDE 10 MEQ: 10 INJECTION, SOLUTION INTRAVENOUS at 10:34

## 2017-07-10 RX ADMIN — Medication 5 ML: at 06:00

## 2017-07-10 RX ADMIN — SODIUM CHLORIDE 40 MG: 9 INJECTION INTRAMUSCULAR; INTRAVENOUS; SUBCUTANEOUS at 09:14

## 2017-07-10 RX ADMIN — POTASSIUM CHLORIDE 10 MEQ: 10 INJECTION, SOLUTION INTRAVENOUS at 13:22

## 2017-07-10 RX ADMIN — POTASSIUM CHLORIDE 10 MEQ: 10 INJECTION, SOLUTION INTRAVENOUS at 12:01

## 2017-07-10 RX ADMIN — ACETYLCYSTEINE 400 MG: 200 SOLUTION ORAL; RESPIRATORY (INHALATION) at 08:26

## 2017-07-10 RX ADMIN — PIPERACILLIN SODIUM,TAZOBACTAM SODIUM 3.38 G: 3; .375 INJECTION, POWDER, FOR SOLUTION INTRAVENOUS at 17:28

## 2017-07-10 RX ADMIN — POTASSIUM CHLORIDE 10 MEQ: 10 INJECTION, SOLUTION INTRAVENOUS at 09:13

## 2017-07-10 RX ADMIN — Medication 10 ML: at 21:35

## 2017-07-10 RX ADMIN — PIPERACILLIN SODIUM,TAZOBACTAM SODIUM 3.38 G: 3; .375 INJECTION, POWDER, FOR SOLUTION INTRAVENOUS at 23:23

## 2017-07-10 RX ADMIN — LEVALBUTEROL INHALATION 1.25MG/3ML 1.25 MG: 1.25 SOLUTION RESPIRATORY (INHALATION) at 20:40

## 2017-07-10 RX ADMIN — AZITHROMYCIN MONOHYDRATE 500 MG: 500 INJECTION, POWDER, LYOPHILIZED, FOR SOLUTION INTRAVENOUS at 16:21

## 2017-07-10 RX ADMIN — PIPERACILLIN SODIUM,TAZOBACTAM SODIUM 3.38 G: 3; .375 INJECTION, POWDER, FOR SOLUTION INTRAVENOUS at 07:54

## 2017-07-10 RX ADMIN — MAGNESIUM SULFATE IN WATER 2 G: 40 INJECTION, SOLUTION INTRAVENOUS at 09:14

## 2017-07-10 RX ADMIN — HEPARIN SODIUM 5000 UNITS: 5000 INJECTION, SOLUTION INTRAVENOUS; SUBCUTANEOUS at 21:35

## 2017-07-10 RX ADMIN — LEVALBUTEROL INHALATION 1.25MG/3ML 1.25 MG: 1.25 SOLUTION RESPIRATORY (INHALATION) at 08:26

## 2017-07-10 NOTE — PROGRESS NOTES
[facility] Residency Program     Resident ICU Progress Note      Name: Dunia Ewing   : 1965   MRN: [id number]   Date: 7/10/2017     I have reviewed the flowsheet and previous days notes. IMPRESSION:   · Acute respiratory failure w/Hypoxia - Improving. This morning tolerating face mask at 8 L/min  · Bronchitis vs. PNA - Responding to treatment. Not growth in 2 days on BCx, no significant bacteria on Sputum Cx. CT Chest showed RLL atelectasis/consolidation. · Hypokalemia - Replaced  · Hypomagnesemia - Replaced   · Hx of cerebral palsy - Stable. Phenobarbital level adequate. · Hx of aspiration PNA -  Most likely a determinant factor on current presentation  · Hx of seizure disorder - Stable   PLAN:   · Continue Zosyn and azithromycin  · CT abdomen  · Continue f/u Sputum cx and BCx  · F/U BMP, Mg, P daily and replace electrolytes as needed   · NPO until more awake  · Lovenox proph  · PPI     Overnight events reviewed: Patient desaturated on several occasions that improved with nebs.         Vital Signs:    Visit Vitals    BP 92/72    Pulse 77    Temp 98.6 °F (37 °C)    Resp 16    Ht 5' 7\" (1.702 m)    Wt 75 lb 2.8 oz (34.1 kg)    SpO2 100%    BMI 11.77 kg/m2       O2 Device: Ventimask   O2 Flow Rate (L/min): 3 l/min   Temp (24hrs), Av.7 °F (37.1 °C), Min:97.8 °F (36.6 °C), Max:99.7 °F (37.6 °C)       Intake/Output:   Last shift:      07/10 0701 - 07/10 1900  In: -   Out: 105 [Urine:105]  Last 3 shifts: 1901 - 07/10 0700  In: 2846.7 [I.V.:2846.7]  Out: 950 [Urine:950]    Intake/Output Summary (Last 24 hours) at 07/10/17 0942  Last data filed at 07/10/17 0747   Gross per 24 hour   Intake          1286.66 ml   Output             1055 ml   Net           231.66 ml     Hemodynamics:   PAP:     Wedge:     CVP:      CO:     CI:     SVR:     PVR:          Physical Exam:  General:  Chronically ill-appearing, cachectic, sleeping comfortably   Head: Normocephalic, without obvious abnormality, atraumatic. Eyes:  Conjunctivae/corneas clear. EOMs intact. Throat: Lips, mucosa, and tongue moist..   Neck: Supple, symmetrical, trachea midline, no adenopathy, thyroid: no enlargement/tenderness/nodules, no carotid bruit and no JVD. Lungs:   Mild wheezing on expiration bilaterally    Chest wall:  No tenderness or deformity. Heart:  Regular rate and rhythm, S1, S2 normal, no m/r/g   Abdomen:   Soft, non-tender. Bowel sounds normal. No masses,  No organomegaly. Extremities: Contractures unchanged. No cyanosis or edema. No apparent calf tenderness or cords. Pulses: 2+ and symmetric all extremities. Skin:  turgor normal.    Neurologic: Non-verbal.  Responds to painful stimuli. Contractures unchanged.           DATA:   Current Facility-Administered Medications   Medication Dose Route Frequency    potassium chloride 10 mEq in 100 ml IVPB  10 mEq IntraVENous Q1H    magnesium sulfate 2 g/50 ml IVPB (premix or compounded)  2 g IntraVENous ONCE    pantoprazole (PROTONIX) 40 mg in sodium chloride 0.9 % 10 mL injection  40 mg IntraVENous DAILY    acetylcysteine (MUCOMYST) 200 mg/mL (20 %) solution 400 mg  2 mL Nebulization BID RT    levalbuterol (XOPENEX) nebulizer soln 1.25 mg/3 mL  1.25 mg Nebulization BID RT    albuterol-ipratropium (DUO-NEB) 2.5 MG-0.5 MG/3 ML  3 mL Nebulization Q4H PRN    piperacillin-tazobactam (ZOSYN) 3.375 g in 0.9% sodium chloride (MBP/ADV) 100 mL  3.375 g IntraVENous Q8H    azithromycin (ZITHROMAX) 500 mg in 0.9% sodium chloride (MBP/ADV) 250 mL  500 mg IntraVENous Q24H    ascorbic acid (vitamin C) (VITAMIN C) tablet 500 mg  500 mg Oral BID    polyethylene glycol (MIRALAX) packet 17 g  17 g Oral DAILY PRN    sodium chloride (NS) flush 5-10 mL  5-10 mL IntraVENous Q8H    sodium chloride (NS) flush 5-10 mL  5-10 mL IntraVENous PRN    HYDROmorphone (PF) (DILAUDID) injection 0.2 mg  0.2 mg IntraVENous Q4H PRN    naloxone (NARCAN) injection 0.4 mg  0.4 mg IntraVENous PRN    diphenhydrAMINE (BENADRYL) injection 12.5 mg  12.5 mg IntraVENous Q4H PRN    ondansetron (ZOFRAN) injection 4 mg  4 mg IntraVENous Q6H PRN    docusate sodium (COLACE) capsule 100 mg  100 mg Oral BID    acetaminophen (TYLENOL) suppository 650 mg  650 mg Rectal Q4H PRN    enoxaparin (LOVENOX) injection 30 mg  30 mg SubCUTAneous Q24H    diphenhydrAMINE (BENADRYL) injection 12.5 mg  12.5 mg IntraVENous QHS    valproate (DEPACON) 750 mg in 0.9% sodium chloride 50 mL IVPB  750 mg IntraVENous Q24H    valproate (DEPACON) 625 mg in 0.9% sodium chloride 50 mL IVPB  625 mg IntraVENous Q24H            Labs:  Recent Labs      07/10/17   0414  07/09/17   0400  07/08/17   1422   WBC  6.3  5.6  7.7   HGB  10.3*  9.9*  12.0*   HCT  30.3*  30.6*  36.7   PLT  118*  113*  164     Recent Labs      07/10/17   0414  07/09/17   0400  07/08/17   1422   NA  136  138  135*   K  3.1*  3.3*  3.8   CL  99  106  96*   CO2  31  27  29   GLU  106*  113*  99   BUN  5*  8  23*   CREA  0.39*  0.37*  0.96   CA  8.1*  8.1*  8.5   MG  1.3*  1.4*   --    PHOS   --   1.8*   --    ALB  2.6*  2.4*  3.4*   SGOT  32  33  39*   ALT  31  26  31     No results for input(s): PH, PCO2, PO2, HCO3, FIO2 in the last 72 hours. Imaging:  I have personally reviewed the patients radiographs and reports. The pt is critically ill.     My assessment/plan to be discussed with: Dr. Micah Levine MD  PGY-2 Family Medicine Resident

## 2017-07-10 NOTE — PROGRESS NOTES
0700: Bedside and Verbal shift change report given to [employer] (oncoming nurse) by Lee Neumann (offgoing nurse). Report included the following information SBAR, Kardex, ED Summary, Intake/Output, MAR and Cardiac Rhythm Sinus tach     1000: Report given to [place]. Pt being transferred to [facility].

## 2017-07-10 NOTE — WOUND CARE
Wound care consult:  Initial visit for skin assessment, transferred to the [facility] and assessed with staff nurses. Mother at bedside. Patient is immobile. Assessment  All skin folds and bony prominences assessed, turned with staff assistance. Patient is contracted and immobile  Incontinent of stool, condom cath in place. All bony prominences intact, areas of red blanching skin on the prominent bones. Heels red, intact and blanching. Right lateral is red, areas of partial thickness skin loss, related to moisture; present on admission. Heelbo and elbow pads applied. Treatment  Incontinent care given; Repositioned in bed   Off loading cushion under head and heels. Zinc applied to the right ear, skin care and moisture management discussed with mother, verbalized understanding. Recommendations/Plan  Low air loss bed ordered. Turn, reposition every 2 hours as tolerated, float heels as tolerated. Incontinent care with comfort shields. Apply moisture barrier as needed. Staff nurse aware of care given and skin care recommendations. Will follow, reconsult as needed.     Yocasta Ceballos

## 2017-07-10 NOTE — PROGRESS NOTES
Chart review documented 2 cancellations of orders for SLP swallowing evaluation. Patient with nonrebreather on. Please reconsult speech when needed. H/o aspiration PNA.

## 2017-07-10 NOTE — PROGRESS NOTES
I met with pt.'s mother as an introductory visit. She confirmed that [place] lives with her. She is the primary caregiver. She states her other two sons and daughter-in-laws also assist her with caring for [place]. I asked her if she wanted personal care services for pt as he has medicaid and she said \"No,we prefer to care for my son. \"  At home,pt has a hospital bed and wheelchair. Pt is predominately bed-bound. Pt does not have home oxygen,CPaP or BiPaP @ home. Per pt.'s mother,pt.'s PCP (Dr Hiral Borja) manages pt.'s depakote and phenobarbiotal levels and all care details. In the past,pt had home health [facility]. If home health is prescribed by physician ,pt prefers [facility]. However,pt.'s mother is hopeful that pt will not need home health services. When pt had home health before,he had a pressure ulcer which has since healed. PCP is Dr Hiral Borja with [facility]. Case Management will continue to follow pt throughout hospitalization. Thank you.   Iglesia Wagner

## 2017-07-10 NOTE — CDMP QUERY
1.   Please clarify if this patient is being treated/managed for:    =>Hypokalemia and hypomagnesemia in the setting of sepsis and aspiration pneumonia, requiring treatment with IV magnesium and potassium and serial monitoring of lab values. =>Other Explanation of clinical findings  =>Unable to Determine (no explanation of clinical findings)    The medical record reflects the following clinical findings, treatment, and risk factors:    48 y/o male admitted for sepsis and possible aspiration pneumonia. On 7/10 serum potassium is 3.1 and serum magnesium is 1.3. He is being treated with IV repletion of both potassium and magnesium and serial monitoring of lab values. Please clarify and document your clinical opinion in the progress notes and discharge summary including the definitive and/or presumptive diagnosis, (suspected or probable), related to the above clinical findings. Please include clinical findings supporting your diagnosis. Thanks for your time.     Aparna Barksdale RN, BSN  [phone]

## 2017-07-10 NOTE — PROGRESS NOTES
Primary Nurse Alcon Wilcox RN and argenis RN performed a dual skin assessment on this patient Impairment noted- see wound doc flow sheet  Glenn score is 12

## 2017-07-10 NOTE — PROGRESS NOTES
[facility]     Name: Annette Phelps MRN: [id number]   : 1965 Hospital: [address]   Date: 7/10/2017        Impression Plan   1. Acute respiratory failure  2. Hypoxia  3. Bronchitis vs. PNA  4. Hx of Cerebral palsy  5. Hx of aspiration PNA  6. Hx of seizure disorder               · Wean Venti   · Continue Zosyn. D/C azithro  · Wean O2 with goal sats  90%  · Sputum cx NGTD  · Phenobarbital level wnl  · Fluids stopped  · Follow up Blood cx  · Swallow eval  · Advance diet if speech finds appropriate  · Enox proph  · PPI         Radiology  ( personally reviewed) CXR 7/10 reviewed: Contorted, no infiltrates noted    ABG No results for input(s): PHI, PO2I, PCO2I in the last 72 hours. Subjective     Overnight events:  Pt desaturated last night. Lots of frothy sputum noted in the back of the throat, but nursing unable to suction very efficiently and some bleeding with NT suction, CXR showed developing pulmonary edema. Given small dose of diuretics and diuresed well. Sats better this morning. Review of Systems:  Review of systems not obtained due to patient factors. Past Medical History:   Diagnosis Date    Aspiration pneumonia ([address]     Cerebral palsy (HCC)     Ill-defined condition     dsyphasia    Scoliosis     [facility]izure disorder [facility]     Seizures ([facility]       Past Surgical History:   Procedure Laterality Date    HX HEENT        Prior to Admission medications    Medication Sig Start Date End Date Taking? Authorizing Provider   ascorbic acid, vitamin C, powd Take 1,000 mg by mouth daily. Mix with liquid prior to administration. Yes Historical Provider   omeprazole (PRILOSEC) 20 mg capsule Take 20 mg by mouth Before breakfast and dinner. Yes Historical Provider   PHENobarbital (LUMINAL) 30 mg tablet Take 30 mg by mouth two (2) times a day. Yes Historical Provider   divalproex DR (DEPAKOTE) 125 mg tablet Take 125 mg by mouth daily.    Yes Historical Provider divalproex DR (DEPAKOTE) 125 mg tablet Take 250 mg by mouth every evening. Yes Historical Provider   guaiFENesin-dextromethorphan (G-FENESIN DM)  mg tab tablet Take 1 Tab by mouth every four (4) hours as needed. Yes Historical Provider   acetaminophen (TYLENOL) 500 mg tablet Take 1,000 mg by mouth every six (6) hours as needed for Pain. Yes Historical Provider   DOCUSATE CALCIUM (STOOL SOFTENER PO) Take 1 tablet by mouth two (2) times daily as needed. Yes Historical Provider   divalproex DR (DEPAKOTE) 500 mg tablet Take 1 Tab by mouth two (2) times a day. 4/23/14  Yes Karli Lobato NP   triamcinolone acetonide (KENALOG) 0.1 % topical cream Apply  to affected area two (2) times daily as needed for Skin Irritation.  use thin layer    Historical Provider     Current Facility-Administered Medications   Medication Dose Route Frequency    potassium chloride 10 mEq in 100 ml IVPB  10 mEq IntraVENous Q1H    pantoprazole (PROTONIX) 40 mg in sodium chloride 0.9 % 10 mL injection  40 mg IntraVENous DAILY    acetylcysteine (MUCOMYST) 200 mg/mL (20 %) solution 400 mg  2 mL Nebulization BID RT    levalbuterol (XOPENEX) nebulizer soln 1.25 mg/3 mL  1.25 mg Nebulization BID RT    piperacillin-tazobactam (ZOSYN) 3.375 g in 0.9% sodium chloride (MBP/ADV) 100 mL  3.375 g IntraVENous Q8H    azithromycin (ZITHROMAX) 500 mg in 0.9% sodium chloride (MBP/ADV) 250 mL  500 mg IntraVENous Q24H    ascorbic acid (vitamin C) (VITAMIN C) tablet 500 mg  500 mg Oral BID    sodium chloride (NS) flush 5-10 mL  5-10 mL IntraVENous Q8H    docusate sodium (COLACE) capsule 100 mg  100 mg Oral BID    enoxaparin (LOVENOX) injection 30 mg  30 mg SubCUTAneous Q24H    diphenhydrAMINE (BENADRYL) injection 12.5 mg  12.5 mg IntraVENous QHS    valproate (DEPACON) 750 mg in 0.9% sodium chloride 50 mL IVPB  750 mg IntraVENous Q24H    valproate (DEPACON) 625 mg in 0.9% sodium chloride 50 mL IVPB  625 mg IntraVENous Q24H     Allergies Allergen Reactions    Other Medication Swelling     Mother states the patient had a reaction to an antibiotic as a child  She cannot recall the name of the antibiotic nor the indication   Per [place] [phone] the patient has had prescriptions for Augmentin  Tolerates Cefdinir. Social History   Substance Use Topics    Smoking status: Never Smoker    Smokeless tobacco: Never Used    Alcohol use No      Family History   Problem Relation Age of Onset    Cancer Father      prostate    Cancer Maternal Aunt     Heart Disease Maternal Aunt     Cancer Maternal Grandmother     Cancer Maternal Grandfather     Cancer Paternal Grandmother     Cancer Paternal Grandfather     Cancer Other           Laboratory: I have personally reviewed the critical care flowsheet and labs.      Recent Labs      07/10/17   0414  07/09/17   0400  07/08/17   1422   WBC  6.3  5.6  7.7   HGB  10.3*  9.9*  12.0*   HCT  30.3*  30.6*  36.7   PLT  118*  113*  164     Recent Labs      07/10/17   0414  07/09/17   0400  07/08/17   1422   NA  136  138  135*   K  3.1*  3.3*  3.8   CL  99  106  96*   CO2  31  27  29   GLU  106*  113*  99   BUN  5*  8  23*   CREA  0.39*  0.37*  0.96   CA  8.1*  8.1*  8.5   MG  1.3*  1.4*   --    PHOS   --   1.8*   --    ALB  2.6*  2.4*  3.4*   SGOT  32  33  39*   ALT  31  26  31       Objective:     Mode Rate Tidal Volume Pressure FiO2 PEEP            40 %       Vital Signs:     TMAX(24)      Intake/Output:   Last shift:         Last 3 shifts: 07/10 0701 - 07/10 1900  In: -   Out: 105 [Urine:105]RRIOLAST3    Intake/Output Summary (Last 24 hours) at 07/10/17 1017  Last data filed at 07/10/17 0747   Gross per 24 hour   Intake          1286.66 ml   Output             1055 ml   Net           231.66 ml     EXAM:   GENERAL: well developed and in no distress, contracted, not tracking, but opening eyes to stimuli HEENT:  PERRL, EOMI, no alar flaring or epistaxis, oral mucosa moist without cyanosis, NECK:  no jugular vein distention, no retractions, no thyromegaly or masses, LUNGS: CTA, no w/r/r , HEART:  Regular rate and rhythm with no MGR; no edema is present, ABDOMEN:  soft with no tenderness, bowel sounds present, EXTREMITIES:  warm with no cyanosis, atrophied SKIN:  no jaundice or ecchymosis and NEUROLOGIC:  Sleepy and grimaces, protecting airway    Nelly Gallegos MD  [facility] [place]

## 2017-07-10 NOTE — PROGRESS NOTES
Physical Therapy:  Orders received and chart reviewed. Attempted to see patient. Currently with no family present in the room, patient able to open eyes to verbal and tactile cuing, unable to sustain attention to task. He was unable to answer questions, audible moan but no words. Currently, still on venti mask due to respiratory distress. We will defer evaluation until family present to confirm premorbid history and assistance levels.   Thank you  Laura Larkin PT,DPT,NCS

## 2017-07-10 NOTE — PROGRESS NOTES
Nutrition Assessment:    RECOMMENDATIONS/INTERVENTION(S):   Diet consistency/advancement per SLP  Depending on diet consistency, will add appropriate ONS once advanced  Monitor and replete lytes prn  Will continue to monitor plan of care and provide further recs/interventions prn    ASSESSMENT:   Noted consult for general nutrition & supplement management. Chart reviewed. 48 yo male admitted with sepsis, acute respiratory failure. Hx CP, aspiration pneumonia, seizures. Spoke with mother. Reports pt home diet consists of pureed foods with HTL, adds protein powder and liquid Ensure to items such as oatmeal. Appetite is usually good. Feels weight has been \"pretty stable. \"  Pt currently NPO, not ready for swallowing evaluation at this time per SLP. Lytes repleted. Skin--rt ear red, abrasion noted to chest.  Mother reports pt with hx of pressure injury to his \"bottom\" but that has since healed. No significant weight change. Temporal and BUE muscle wasting observed. Underweight with BMI 11.8    SUBJECTIVE/OBJECTIVE:     Diet Order: NPO  % Eaten:  No data found. Pertinent Medications: [x] Reviewed    Chemistries:  Lab Results   Component Value Date/Time    Sodium 136 07/10/2017 04:14 AM    Potassium 3.1 07/10/2017 04:14 AM    Chloride 99 07/10/2017 04:14 AM    CO2 31 07/10/2017 04:14 AM    Anion gap 6 07/10/2017 04:14 AM    Glucose 106 07/10/2017 04:14 AM    BUN 5 07/10/2017 04:14 AM    Creatinine 0.39 07/10/2017 04:14 AM    BUN/Creatinine ratio 13 07/10/2017 04:14 AM    GFR est AA >60 07/10/2017 04:14 AM    GFR est non-AA >60 07/10/2017 04:14 AM    Calcium 8.1 07/10/2017 04:14 AM    AST (SGOT) 32 07/10/2017 04:14 AM    Alk.  phosphatase 97 07/10/2017 04:14 AM    Protein, total 6.7 07/10/2017 04:14 AM    Albumin 2.6 07/10/2017 04:14 AM    Globulin 4.1 07/10/2017 04:14 AM    A-G Ratio 0.6 07/10/2017 04:14 AM    ALT (SGPT) 31 07/10/2017 04:14 AM      Anthropometrics: Height: 5' 7\" (170.2 cm) Weight: 34.1 kg (75 lb 2.8 oz)    IBW (%IBW): 67.3 kg (148 lb 5.9 oz) ( ) UBW (%UBW):   (  %)    BMI: Body mass index is 11.77 kg/(m^2). This BMI is indicative of:   [x] Underweight    [] Normal    [] Overweight    []  Obesity    []  Extreme Obesity (BMI>40)  Estimated Nutrition Needs (Based on): 1193 Kcals/day (35 kcals/Kg ) , 41 g (1.2 g/Kg body wt) Protein  Carbohydrate:  At Least 130 g/day  Fluids: 1200 mL/day    Last BM: 7/8   [x]Active     []Hyperactive  []Hypoactive       [] Absent   BS  Skin:    [] Intact   [] Incision  [] Breakdown   [] DTI   [] Tears/Excoriation/Abrasion  []Edema [x] Other: right ear red   Wt Readings from Last 30 Encounters:   07/10/17 34.1 kg (75 lb 2.8 oz)   03/10/16 35.8 kg (79 lb)   03/06/16 35.8 kg (79 lb)   09/06/14 34.9 kg (77 lb)   08/31/14 31.8 kg (70 lb)   12/20/13 34.9 kg (77 lb)   11/22/13 34.9 kg (77 lb)   08/02/12 40.8 kg (90 lb)   06/15/12 45.4 kg (100 lb)      NUTRITION DIAGNOSES:   Problem:  Inadequate oral food/beverage intake Swallowing difficulty   Etiology: related to current medical condition, unable to perform swallowing eval hx dysphagia   Signs/Symptoms: as evidenced by NPO baseline diet at home is Pureed with HTL    NUTRITION INTERVENTIONS:  Meals/Snacks: General/healthful diet   Supplements: Commercial supplement              GOAL:   Pt will tolerate diet advancement to appropriate consistency and consume >/=50% meals and oral supplements in next 3-5 days    Cultural, [place]ybeterian, or Ethnic Dietary Needs:    None per mother    LEARNING NEEDS (Diet, Food/Nutrient-Drug Interaction):    [x] None Identifie   [] Identified and Education Provided/Documented   [] Identified and Pt declined/was not appropriate      [x] Interdisciplinary Care Plan Reviewed/Documented    [x] Discharge Needs:    tbd   [] No Nutrition Related Discharge Needs    NUTRITION RISK:   Pt Is At Nutrition Risk  [x]     No Nutrition Risk Identified  []       PT SEEN FOR:    [x]  MD Consult: []Calorie Count []Diabetic Diet Education        []Diet Education     []Electrolyte Management     [x]General Nutrition Management and Supplements     []Management of Tube Feeding     []TPN Recommendations    []  RN Referral:  []MST score >=2     []Enteral/Parenteral Nutrition PTA     []Pregnant: Gestational DM or Multigestation                 [] Pressure Ulcer      []  Low BMI      []  Length of Stay       [] Dysphagia Diet         [] Ventilator      []  Follow-Up      Previous Recommendations:   [] Implemented          [] Not Implemented          [x] Not Applicable    Previous Goal:   [] Met              [] Progressing Towards Goal              [] Not Progressing Towards Goal   [x] Not Applicable              Kamaljit Sims, [address] [place] [address]   Pager [phone]

## 2017-07-10 NOTE — PROGRESS NOTES
Daily Progress Note: 7/10/2017  Hay Tovar MD    Assessment/Plan:   1) Sepsis/possible aspiration PNA: 2/4 SIRS on admission, clinically has pneumonia despite negative CXR. High risks for aspiration. Failed outpatient Abx. Check sputum Cx, blood Cx. Empirically on IV Zosyn, Azithro (for atypical)     2) Acute encephalopathy: more lethargic. Likely due to sepsis. depakote and phenobarb levels. Monitor closely     3) Hypotension: due to sepsis. Slowly improving with IVF. Will monitor closely     4) Dysphagia/severe protein-jesus malnutrition: has severe muscle wasting. Will keep NPO due to aspiration PNA.   Consult speech, nutrition     5) Cerebral palsy/seizure d/o: cont depakote, phenobarb        Problem List:  Problem List as of 7/10/2017  Date Reviewed: 7/8/2017          Codes Class Noted - Resolved    * (Principal)Sepsis ([facility] ICD-10-CM: A41.9  ICD-9-CM: 038.9, 995.91  7/8/2017 - Present        Aspiration pneumonia ([facility] ICD-10-CM: J69.0  ICD-9-CM: 507.0  7/8/2017 - Present        Hypotension ICD-10-CM: I95.9  ICD-9-CM: 458.9  7/8/2017 - Present        Acute encephalopathy ICD-10-CM: G93.40  ICD-9-CM: 348.30  7/8/2017 - Present        Fever ICD-10-CM: R50.9  ICD-9-CM: 780.60  8/31/2014 - Present        SIRS (systemic inflammatory response syndrome) ([facility] ICD-10-CM: R65.10  ICD-9-CM: 995.90  8/31/2014 - Present        Cough ICD-10-CM: R05  ICD-9-CM: 786.2  8/31/2014 - Present        Sepsis(995.91) ICD-10-CM: A41.9  ICD-9-CM: 995.91  6/15/2012 - Present        UTI (urinary tract infection) ICD-10-CM: N39.0  ICD-9-CM: 599.0  6/15/2012 - Present    Overview Signed 6/17/2012  3:58 PM by Cindy Sommers     Escherichia coli    >100K col/mL             Phenobarbital toxicity ICD-10-CM: T42.3X1A  ICD-9-CM: 967.0, E980.1  6/15/2012 - Present        Cerebral palsy ([facility] ICD-10-CM: G80.9  ICD-9-CM: 343.9  6/15/2012 - Present        Scoliosis ICD-10-CM: M41.9  ICD-9-CM: 737.30  6/15/2012 - Present        Dehydration ICD-10-CM: E86.0  ICD-9-CM: 276.51  6/15/2012 - Present        Leukocytosis, unspecified ICD-10-CM: D72.829  ICD-9-CM: 288.60  6/15/2012 - Present        Encephalopathy ICD-10-CM: G93.40  ICD-9-CM: 348.30  6/15/2012 - Present              Subjective:    46 yo hx of cerebral palsy, bedbound, seizure d/o, presented w/ fever, sepsis, possible aspiration pneumonia. The patient cannot give a history. His mother, who is the POA/care taker, stated that the patient has had a worsening productive cough for several weeks, associated with dyspnea, fevers, chills. He has a hx of frequent aspiration pneumonia. The patient recently saw his PCP who prescribed cefdinir for 10 days, which helped his symptoms, but now worsened again. In the ED, temp was 100. WBC 7.7. SBP ~80s. CXR unremarkable    : Moves spontaneously. Coughs episodically. On IV Zosyn and Azith. CT chest/Ab planned for today. Cont NPO until more awake. K+./Mg+ a little low - replace. 7/10:  CXR last night showed overhydration - placed on O2 facemask and diuresed. Today O2 requirements decreased. More lethargic today. K+ and Mag cont to be low - replace. Review of Systems:   Review of systems not obtained due to patient factors. Objective:   Physical Exam:     Visit Vitals    BP 92/72    Pulse 77    Temp 98.9 °F (37.2 °C)    Resp 16    Ht 5' 7\" (1.702 m)    Wt 34.1 kg (75 lb 2.8 oz)    SpO2 99%    BMI 11.77 kg/m2    O2 Flow Rate (L/min): 3 l/min O2 Device: Non-rebreather mask    Temp (24hrs), Av.7 °F (37.1 °C), Min:97.8 °F (36.6 °C), Max:99.7 °F (37.6 °C)    07/10 0701 - 07/10 1900  In: -   Out: 105 [Urine:105]   1901 - 07/10 07  In: 2846.7 [I.V.:2846.7]  Out: 950 [Urine:950]    General:  Chronically ill-appearing, cachectic, mild distress   Head:  Normocephalic, without obvious abnormality, atraumatic. Eyes:  Conjunctivae/corneas clear. EOMs intact. Throat: Lips, mucosa, and tongue moist.. Neck: Supple, symmetrical, trachea midline, no adenopathy, thyroid: no enlargement/tenderness/nodules, no carotid bruit and no JVD. Lungs:   Clear to auscultation bilaterally except scattered rhonchi/upper airway noise which clear after cough. Chest wall:  No tenderness or deformity. Heart:  Regular rate and rhythm, S1, S2 normal, no murmur, click, rub or gallop. Abdomen:   Soft, non-tender. Bowel sounds normal. No masses,  No organomegaly. Extremities: Contractures unchanged. no cyanosis or edema. No apparent calf tenderness or cords. Pulses: 2+ and symmetric all extremities. Skin:  turgor normal.    Neurologic: Non-verbal.  Responds to painful stimuli. Contractures unchanged. Data Review:       Recent Days:  Recent Labs      07/10/17   0414  07/09/17   0400  07/08/17   1422   WBC  6.3  5.6  7.7   HGB  10.3*  9.9*  12.0*   HCT  30.3*  30.6*  36.7   PLT  118*  113*  164     Recent Labs      07/10/17   0414  07/09/17   0400  07/08/17   1422   NA  136  138  135*   K  3.1*  3.3*  3.8   CL  99  106  96*   CO2  31  27  29   GLU  106*  113*  99   BUN  5*  8  23*   CREA  0.39*  0.37*  0.96   CA  8.1*  8.1*  8.5   MG  1.3*  1.4*   --    PHOS   --   1.8*   --    ALB  2.6*  2.4*  3.4*   TBILI  0.2  0.1*  0.2   SGOT  32  33  39*   ALT  31  26  31     No results for input(s): PH, PCO2, PO2, HCO3, FIO2 in the last 72 hours.     24 Hour Results:  Recent Results (from the past 24 hour(s))   METABOLIC PANEL, COMPREHENSIVE    Collection Time: 07/10/17  4:14 AM   Result Value Ref Range    Sodium 136 136 - 145 mmol/L    Potassium 3.1 (L) 3.5 - 5.1 mmol/L    Chloride 99 97 - 108 mmol/L    CO2 31 21 - 32 mmol/L    Anion gap 6 5 - 15 mmol/L    Glucose 106 (H) 65 - 100 mg/dL    BUN 5 (L) 6 - 20 MG/DL    Creatinine 0.39 (L) 0.70 - 1.30 MG/DL    BUN/Creatinine ratio 13 12 - 20      GFR est AA >60 >60 ml/min/1.73m2    GFR est non-AA >60 >60 ml/min/1.73m2    Calcium 8.1 (L) 8.5 - 10.1 MG/DL    Bilirubin, total 0.2 0.2 - 1.0 MG/DL    ALT (SGPT) 31 12 - 78 U/L    AST (SGOT) 32 15 - 37 U/L    Alk. phosphatase 97 45 - 117 U/L    Protein, total 6.7 6.4 - 8.2 g/dL    Albumin 2.6 (L) 3.5 - 5.0 g/dL    Globulin 4.1 (H) 2.0 - 4.0 g/dL    A-G Ratio 0.6 (L) 1.1 - 2.2     MAGNESIUM    Collection Time: 07/10/17  4:14 AM   Result Value Ref Range    Magnesium 1.3 (L) 1.6 - 2.4 mg/dL   CBC WITH AUTOMATED DIFF    Collection Time: 07/10/17  4:14 AM   Result Value Ref Range    WBC 6.3 4.1 - 11.1 K/uL    RBC 3.18 (L) 4.10 - 5.70 M/uL    HGB 10.3 (L) 12.1 - 17.0 g/dL    HCT 30.3 (L) 36.6 - 50.3 %    MCV 95.3 80.0 - 99.0 FL    MCH 32.4 26.0 - 34.0 PG    MCHC 34.0 30.0 - 36.5 g/dL    RDW 12.3 11.5 - 14.5 %    PLATELET 336 (L) 684 - 400 K/uL    NEUTROPHILS 90 (H) 32 - 75 %    LYMPHOCYTES 4 (L) 12 - 49 %    MONOCYTES 6 5 - 13 %    EOSINOPHILS 0 0 - 7 %    BASOPHILS 0 0 - 1 %    ABS. NEUTROPHILS 5.6 1.8 - 8.0 K/UL    ABS. LYMPHOCYTES 0.3 (L) 0.8 - 3.5 K/UL    ABS. MONOCYTES 0.4 0.0 - 1.0 K/UL    ABS. EOSINOPHILS 0.0 0.0 - 0.4 K/UL    ABS.  BASOPHILS 0.0 0.0 - 0.1 K/UL       Medications reviewed  Current Facility-Administered Medications   Medication Dose Route Frequency    pantoprazole (PROTONIX) 40 mg in sodium chloride 0.9 % 10 mL injection  40 mg IntraVENous DAILY    acetylcysteine (MUCOMYST) 200 mg/mL (20 %) solution 400 mg  2 mL Nebulization BID RT    levalbuterol (XOPENEX) nebulizer soln 1.25 mg/3 mL  1.25 mg Nebulization BID RT    albuterol-ipratropium (DUO-NEB) 2.5 MG-0.5 MG/3 ML  3 mL Nebulization Q4H PRN    piperacillin-tazobactam (ZOSYN) 3.375 g in 0.9% sodium chloride (MBP/ADV) 100 mL  3.375 g IntraVENous Q8H    azithromycin (ZITHROMAX) 500 mg in 0.9% sodium chloride (MBP/ADV) 250 mL  500 mg IntraVENous Q24H    ascorbic acid (vitamin C) (VITAMIN C) tablet 500 mg  500 mg Oral BID    polyethylene glycol (MIRALAX) packet 17 g  17 g Oral DAILY PRN    sodium chloride (NS) flush 5-10 mL  5-10 mL IntraVENous Q8H    sodium chloride (NS) flush 5-10 mL  5-10 mL IntraVENous PRN    HYDROmorphone (PF) (DILAUDID) injection 0.2 mg  0.2 mg IntraVENous Q4H PRN    naloxone (NARCAN) injection 0.4 mg  0.4 mg IntraVENous PRN    diphenhydrAMINE (BENADRYL) injection 12.5 mg  12.5 mg IntraVENous Q4H PRN    ondansetron (ZOFRAN) injection 4 mg  4 mg IntraVENous Q6H PRN    docusate sodium (COLACE) capsule 100 mg  100 mg Oral BID    acetaminophen (TYLENOL) suppository 650 mg  650 mg Rectal Q4H PRN    enoxaparin (LOVENOX) injection 30 mg  30 mg SubCUTAneous Q24H    diphenhydrAMINE (BENADRYL) injection 12.5 mg  12.5 mg IntraVENous QHS    valproate (DEPACON) 750 mg in 0.9% sodium chloride 50 mL IVPB  750 mg IntraVENous Q24H    valproate (DEPACON) 625 mg in 0.9% sodium chloride 50 mL IVPB  625 mg IntraVENous Q24H       Care Plan discussed with: Patient/Family and Nurse    Total time spent with patient: 30 minutes.     Paul Vyas MD

## 2017-07-10 NOTE — PROGRESS NOTES
Occupational Therapy Note:  Orders received and chart reviewed. Attempted to see patient. Currently with no family present in the room, patient able to open eyes to verbal and tactile cuing, unable to sustain attention to task. He was unable to answer questions, audible moan but no words. Currently, still on venti mask due to respiratory distress. We will defer evaluation until family present to confirm premorbid history and assistance levels. Thank you.   Green Fothergill, OTR/CHRIS

## 2017-07-10 NOTE — PROGRESS NOTES
REceived swallowing evaluation. Patient is lethargic with nasal tent for oxygen. He is not ready for swallowing evaluation at this time. Spoke with his mother about his h/o dysphagia. He has CP and h/o multiple aspiration PNAs. She reported that in the past, they had a warning of some aspiration with choking, coughing and then they took him to GP and he was placed on antibiotics and recovered. THis time, there were no warning signs except for silence/less activity. He has been on purees, honey thick liquids for the last 3 years. He worked with [place] SLP on positioning for least aspiration risk. He usually eats up in his chair. He has not had dental care in 2 years, at which time they had to put him to sleep for dental care. This is concerning b/c bedside oral care for CP patients is typically difficult. HE currently has a noxious mouth odor. Discussed with mother that he is not ready for swallowing evaluation at this time. SLP to follow.

## 2017-07-11 LAB
ATRIAL RATE: 83 BPM
ATRIAL RATE: 98 BPM
BACTERIA SPEC CULT: ABNORMAL
BACTERIA SPEC CULT: ABNORMAL
CALCULATED P AXIS, ECG09: 57 DEGREES
CALCULATED R AXIS, ECG10: 73 DEGREES
CALCULATED R AXIS, ECG10: 82 DEGREES
CALCULATED T AXIS, ECG11: 59 DEGREES
CALCULATED T AXIS, ECG11: 79 DEGREES
DIAGNOSIS, 93000: NORMAL
DIAGNOSIS, 93000: NORMAL
GRAM STN SPEC: ABNORMAL
P-R INTERVAL, ECG05: 130 MS
Q-T INTERVAL, ECG07: 344 MS
Q-T INTERVAL, ECG07: 354 MS
QRS DURATION, ECG06: 64 MS
QRS DURATION, ECG06: 70 MS
QTC CALCULATION (BEZET), ECG08: 415 MS
QTC CALCULATION (BEZET), ECG08: 436 MS
SERVICE CMNT-IMP: ABNORMAL
VENTRICULAR RATE, ECG03: 83 BPM
VENTRICULAR RATE, ECG03: 97 BPM

## 2017-07-11 PROCEDURE — 77010033678 HC OXYGEN DAILY

## 2017-07-11 PROCEDURE — 93005 ELECTROCARDIOGRAM TRACING: CPT

## 2017-07-11 PROCEDURE — 74011000258 HC RX REV CODE- 258: Performed by: FAMILY MEDICINE

## 2017-07-11 PROCEDURE — 74011250636 HC RX REV CODE- 250/636: Performed by: FAMILY MEDICINE

## 2017-07-11 PROCEDURE — 94640 AIRWAY INHALATION TREATMENT: CPT

## 2017-07-11 PROCEDURE — 74011000250 HC RX REV CODE- 250: Performed by: FAMILY MEDICINE

## 2017-07-11 PROCEDURE — 74011250636 HC RX REV CODE- 250/636: Performed by: INTERNAL MEDICINE

## 2017-07-11 PROCEDURE — 65660000000 HC RM CCU STEPDOWN

## 2017-07-11 PROCEDURE — C9113 INJ PANTOPRAZOLE SODIUM, VIA: HCPCS | Performed by: INTERNAL MEDICINE

## 2017-07-11 PROCEDURE — 74011000250 HC RX REV CODE- 250: Performed by: INTERNAL MEDICINE

## 2017-07-11 PROCEDURE — 74011250636 HC RX REV CODE- 250/636: Performed by: PHYSICIAN ASSISTANT

## 2017-07-11 RX ORDER — LEVOFLOXACIN 5 MG/ML
750 INJECTION, SOLUTION INTRAVENOUS DAILY
Status: DISCONTINUED | OUTPATIENT
Start: 2017-07-11 | End: 2017-07-16 | Stop reason: HOSPADM

## 2017-07-11 RX ORDER — POTASSIUM CHLORIDE 7.45 MG/ML
10 INJECTION INTRAVENOUS
Status: COMPLETED | OUTPATIENT
Start: 2017-07-11 | End: 2017-07-12

## 2017-07-11 RX ORDER — MAGNESIUM SULFATE 1 G/100ML
1 INJECTION INTRAVENOUS ONCE
Status: COMPLETED | OUTPATIENT
Start: 2017-07-11 | End: 2017-07-11

## 2017-07-11 RX ORDER — PHENOBARBITAL SODIUM 65 MG/ML
30 INJECTION INTRAMUSCULAR EVERY 12 HOURS
Status: DISCONTINUED | OUTPATIENT
Start: 2017-07-11 | End: 2017-07-15

## 2017-07-11 RX ORDER — PHENOBARBITAL SODIUM 65 MG/ML
30 INJECTION INTRAMUSCULAR EVERY 12 HOURS
Status: DISCONTINUED | OUTPATIENT
Start: 2017-07-11 | End: 2017-07-11

## 2017-07-11 RX ADMIN — PHENOBARBITAL SODIUM 30 MG: 65 INJECTION INTRAMUSCULAR; INTRAVENOUS at 13:09

## 2017-07-11 RX ADMIN — LEVALBUTEROL INHALATION 1.25MG/3ML 1.25 MG: 1.25 SOLUTION RESPIRATORY (INHALATION) at 07:20

## 2017-07-11 RX ADMIN — HEPARIN SODIUM 5000 UNITS: 5000 INJECTION, SOLUTION INTRAVENOUS; SUBCUTANEOUS at 08:42

## 2017-07-11 RX ADMIN — PIPERACILLIN SODIUM,TAZOBACTAM SODIUM 3.38 G: 3; .375 INJECTION, POWDER, FOR SOLUTION INTRAVENOUS at 08:41

## 2017-07-11 RX ADMIN — HEPARIN SODIUM 5000 UNITS: 5000 INJECTION, SOLUTION INTRAVENOUS; SUBCUTANEOUS at 21:14

## 2017-07-11 RX ADMIN — SODIUM CHLORIDE 40 MG: 9 INJECTION INTRAMUSCULAR; INTRAVENOUS; SUBCUTANEOUS at 08:41

## 2017-07-11 RX ADMIN — DIPHENHYDRAMINE HYDROCHLORIDE 12.5 MG: 50 INJECTION, SOLUTION INTRAMUSCULAR; INTRAVENOUS at 21:15

## 2017-07-11 RX ADMIN — PHENOBARBITAL SODIUM 30 MG: 65 INJECTION INTRAMUSCULAR; INTRAVENOUS at 21:14

## 2017-07-11 RX ADMIN — VALPROATE SODIUM 750 MG: 100 INJECTION, SOLUTION INTRAVENOUS at 21:18

## 2017-07-11 RX ADMIN — ACETYLCYSTEINE: 200 SOLUTION ORAL; RESPIRATORY (INHALATION) at 19:29

## 2017-07-11 RX ADMIN — Medication 10 ML: at 14:00

## 2017-07-11 RX ADMIN — VALPROATE SODIUM 625 MG: 100 INJECTION, SOLUTION INTRAVENOUS at 08:41

## 2017-07-11 RX ADMIN — Medication 10 ML: at 21:23

## 2017-07-11 RX ADMIN — POTASSIUM CHLORIDE 10 MEQ: 10 INJECTION, SOLUTION INTRAVENOUS at 10:00

## 2017-07-11 RX ADMIN — ACETYLCYSTEINE 400 MG: 200 SOLUTION ORAL; RESPIRATORY (INHALATION) at 07:20

## 2017-07-11 RX ADMIN — Medication 10 ML: at 05:57

## 2017-07-11 RX ADMIN — MAGNESIUM SULFATE HEPTAHYDRATE 1 G: 1 INJECTION, SOLUTION INTRAVENOUS at 08:41

## 2017-07-11 RX ADMIN — LEVOFLOXACIN 750 MG: 5 INJECTION, SOLUTION INTRAVENOUS at 11:56

## 2017-07-11 RX ADMIN — POTASSIUM CHLORIDE 10 MEQ: 10 INJECTION, SOLUTION INTRAVENOUS at 12:00

## 2017-07-11 RX ADMIN — POTASSIUM CHLORIDE 10 MEQ: 10 INJECTION, SOLUTION INTRAVENOUS at 09:00

## 2017-07-11 RX ADMIN — POTASSIUM CHLORIDE 10 MEQ: 10 INJECTION, SOLUTION INTRAVENOUS at 11:56

## 2017-07-11 RX ADMIN — LEVALBUTEROL INHALATION 1.25MG/3ML 1.25 MG: 1.25 SOLUTION RESPIRATORY (INHALATION) at 19:29

## 2017-07-11 NOTE — PROGRESS NOTES
Daily Progress Note: 7/11/2017  Arsenio Guardado MD    Assessment/Plan:   1) Sepsis/possible aspiration PNA: 2/4 SIRS on admission. High risks for aspiration. Failed outpatient Abx. Check sputum Cx, blood Cx. Empirically on IV Zosyn, Azithro (for atypical)     2) Acute encephalopathy: more lethargic. Likely due to sepsis. Monitor closely     3) Hypotension: due to sepsis. Slowly improving with IVF. Will monitor closely     4) Dysphagia/severe protein-jesus malnutrition: has severe muscle wasting. Will keep NPO due to aspiration PNA.   Consult speech, nutrition     5) Cerebral palsy/seizure d/o: cont depakote, phenobarb        Problem List:  Problem List as of 7/11/2017  Date Reviewed: 7/8/2017          Codes Class Noted - Resolved    * (Principal)Sepsis ([facility] ICD-10-CM: A41.9  ICD-9-CM: 038.9, 995.91  7/8/2017 - Present        Aspiration pneumonia ([facility] ICD-10-CM: J69.0  ICD-9-CM: 507.0  7/8/2017 - Present        Hypotension ICD-10-CM: I95.9  ICD-9-CM: 458.9  7/8/2017 - Present        Acute encephalopathy ICD-10-CM: G93.40  ICD-9-CM: 348.30  7/8/2017 - Present        Fever ICD-10-CM: R50.9  ICD-9-CM: 780.60  8/31/2014 - Present        SIRS (systemic inflammatory response syndrome) ([facility] ICD-10-CM: R65.10  ICD-9-CM: 995.90  8/31/2014 - Present        Cough ICD-10-CM: R05  ICD-9-CM: 786.2  8/31/2014 - Present        Sepsis(995.91) ICD-10-CM: A41.9  ICD-9-CM: 995.91  6/15/2012 - Present        UTI (urinary tract infection) ICD-10-CM: N39.0  ICD-9-CM: 599.0  6/15/2012 - Present    Overview Signed 6/17/2012  3:58 PM by Dana Pisgah     Escherichia coli    >100K col/mL             Phenobarbital toxicity ICD-10-CM: T42.3X1A  ICD-9-CM: 967.0, E980.1  6/15/2012 - Present        Cerebral palsy ([facility] ICD-10-CM: G80.9  ICD-9-CM: 343.9  6/15/2012 - Present        Scoliosis ICD-10-CM: M41.9  ICD-9-CM: 737.30  6/15/2012 - Present        Dehydration ICD-10-CM: E86.0  ICD-9-CM: 276.51  6/15/2012 - Present        Leukocytosis, unspecified ICD-10-CM: D72.829  ICD-9-CM: 288.60  6/15/2012 - Present        Encephalopathy ICD-10-CM: G93.40  ICD-9-CM: 348.30  6/15/2012 - Present              Subjective:    46 yo hx of cerebral palsy, bedbound, seizure d/o, presented w/ fever, sepsis, possible aspiration pneumonia. The patient cannot give a history. His mother, who is the POA/care taker, stated that the patient has had a worsening productive cough for several weeks, associated with dyspnea, fevers, chills. He has a hx of frequent aspiration pneumonia. The patient recently saw his PCP who prescribed cefdinir for 10 days, which helped his symptoms, but now worsened again. In the ED, temp was 100. WBC 7.7. SBP ~80s. CXR unremarkable    : Moves spontaneously. Coughs episodically. On IV Zosyn and Azith. CT chest/Ab planned for today. Cont NPO until more awake. K+./Mg+ a little low - replace. 7/10:  CXR last night showed overhydration - placed on O2 facemask and diuresed. Today O2 requirements decreased. More lethargic today. K+ and Mag cont to be low - replace. :  About the same. GM here and she reports he is not as responsive as usual. I&O not recorded accurately - nursing is correcting. K+ and Mg+ still low - replace. Review of Systems:   Review of systems not obtained due to patient factors. Objective:   Physical Exam:     Visit Vitals    /71    Pulse 68    Temp 97.2 °F (36.2 °C)    Resp 18    Ht 5' 7\" (1.702 m)    Wt 40.7 kg (89 lb 11.6 oz)    SpO2 100%    BMI 14.05 kg/m2    O2 Flow Rate (L/min): 8 l/min O2 Device: Ventilator    Temp (24hrs), Av.3 °F (36.8 °C), Min:97.2 °F (36.2 °C), Max:98.6 °F (37 °C)         1901 -  0700  In: 1318.3 [I.V.:1318.3]  Out: 4406 [Urine:1755]    General:  Chronically ill-appearing, cachectic, mild distress   Head:  Normocephalic, without obvious abnormality, atraumatic. Eyes:  Conjunctivae/corneas clear. EOMs intact. Throat: Lips, mucosa, and tongue moist..   Neck: Supple, symmetrical, trachea midline, no adenopathy, thyroid: no enlargement/tenderness/nodules, no carotid bruit and no JVD. Lungs:   Clear to auscultation at this time. Chest wall:  No tenderness or deformity. Heart:  Regular rate and rhythm, S1, S2 normal, no murmur, click, rub or gallop. Abdomen:   Soft, non-tender. Bowel sounds normal. No masses,  No organomegaly. Extremities: Contractures unchanged. no cyanosis or edema. No apparent calf tenderness or cords. Pulses: 2+ and symmetric all extremities. Skin:  turgor normal.    Neurologic: Non-verbal.  Responds to painful stimuli. Contractures unchanged. Data Review:       Recent Days:  Recent Labs      07/10/17   0414  07/09/17   0400  07/08/17   1422   WBC  6.3  5.6  7.7   HGB  10.3*  9.9*  12.0*   HCT  30.3*  30.6*  36.7   PLT  118*  113*  164     Recent Labs      07/10/17   0414  07/09/17   0400 07/08/17   1422   NA  136  138  135*   K  3.1*  3.3*  3.8   CL  99  106  96*   CO2  31  27  29   GLU  106*  113*  99   BUN  5*  8  23*   CREA  0.39*  0.37*  0.96   CA  8.1*  8.1*  8.5   MG  1.3*  1.4*   --    PHOS   --   1.8*   --    ALB  2.6*  2.4*  3.4*   TBILI  0.2  0.1*  0.2   SGOT  32  33  39*   ALT  31  26  31     No results for input(s): PH, PCO2, PO2, HCO3, FIO2 in the last 72 hours. 24 Hour Results:  No results found for this or any previous visit (from the past 24 hour(s)).     Medications reviewed  Current Facility-Administered Medications   Medication Dose Route Frequency    potassium chloride 10 mEq in 100 ml IVPB  10 mEq IntraVENous Q1H    magnesium sulfate 1 g/100 ml IVPB (premix or compounded)  1 g IntraVENous ONCE    heparin (porcine) injection 5,000 Units  5,000 Units SubCUTAneous Q12H    pantoprazole (PROTONIX) 40 mg in sodium chloride 0.9 % 10 mL injection  40 mg IntraVENous DAILY    acetylcysteine (MUCOMYST) 200 mg/mL (20 %) solution 400 mg  2 mL Nebulization BID RT  levalbuterol (XOPENEX) nebulizer soln 1.25 mg/3 mL  1.25 mg Nebulization BID RT    albuterol-ipratropium (DUO-NEB) 2.5 MG-0.5 MG/3 ML  3 mL Nebulization Q4H PRN    piperacillin-tazobactam (ZOSYN) 3.375 g in 0.9% sodium chloride (MBP/ADV) 100 mL  3.375 g IntraVENous Q8H    azithromycin (ZITHROMAX) 500 mg in 0.9% sodium chloride (MBP/ADV) 250 mL  500 mg IntraVENous Q24H    ascorbic acid (vitamin C) (VITAMIN C) tablet 500 mg  500 mg Oral BID    polyethylene glycol (MIRALAX) packet 17 g  17 g Oral DAILY PRN    sodium chloride (NS) flush 5-10 mL  5-10 mL IntraVENous Q8H    sodium chloride (NS) flush 5-10 mL  5-10 mL IntraVENous PRN    HYDROmorphone (PF) (DILAUDID) injection 0.2 mg  0.2 mg IntraVENous Q4H PRN    naloxone (NARCAN) injection 0.4 mg  0.4 mg IntraVENous PRN    diphenhydrAMINE (BENADRYL) injection 12.5 mg  12.5 mg IntraVENous Q4H PRN    ondansetron (ZOFRAN) injection 4 mg  4 mg IntraVENous Q6H PRN    docusate sodium (COLACE) capsule 100 mg  100 mg Oral BID    acetaminophen (TYLENOL) suppository 650 mg  650 mg Rectal Q4H PRN    diphenhydrAMINE (BENADRYL) injection 12.5 mg  12.5 mg IntraVENous QHS    valproate (DEPACON) 750 mg in 0.9% sodium chloride 50 mL IVPB  750 mg IntraVENous Q24H    valproate (DEPACON) 625 mg in 0.9% sodium chloride 50 mL IVPB  625 mg IntraVENous Q24H       Care Plan discussed with: Patient/Family and Nurse    Total time spent with patient: 30 minutes.     Rocio Botello MD

## 2017-07-11 NOTE — PROGRESS NOTES
Physical Therapy Note:    Orders acknowledged, PT evaluation attempted and deferred. Pt remains unable to follow commands for participation in evaluation. Extensive discussion help with pt's aunt regarding pt's baseline mobility status and available DME. Per pt's aunt, pt follows commands to assist mother with bed to/from chair transfers (wheelchair and/or recliner), otherwise is dependent for mobility and self-care at baseline. Will continue to follow and complete assessment of bed to/from chair mobility and activity tolerance when pt able to participate and medically appropriate.

## 2017-07-11 NOTE — PROGRESS NOTES
[facility] PROGRESS NOTE  Pulmonary, Critical Care, and Sleep Medicine    Name: Joe Cooley MRN: [id number]   : 1965 Hospital: [facility]   Date: 2017  Admission Date: 2017     Chart and notes reviewed. Data reviewed. I review the patient's current medications in the medical record at each encounter. I have evaluated and examined the patient. Overnight events reviewed:  Afebrile  Sats 99% on ventimask 8L  No new am labs  Resp cx with enterobacter    ROS: Unable to obtain due to nonverbal status and CP. Mother at the bedside and states he is less responsive and engaging than his baseline. Seems more lethargic and sleeping more. Vital Signs:  Visit Vitals    BP 99/68    Pulse 86    Temp 98.3 °F (36.8 °C)    Resp 16    Ht 5' 7\" (1.702 m)    Wt 40.7 kg (89 lb 11.6 oz)    SpO2 99%    BMI 14.05 kg/m2       O2 Device: Ventimask   O2 Flow Rate (L/min): 8 l/min   Temp (24hrs), Av.3 °F (36.8 °C), Min:97.2 °F (36.2 °C), Max:98.6 °F (37 °C)       Intake/Output:   Last shift:         Last 3 shifts:  1901 -  0700  In: 1318.3 [I.V.:1318.3]  Out: 1755 [Urine:1755]    Intake/Output Summary (Last 24 hours) at 17 1042  Last data filed at 17 2318   Gross per 24 hour   Intake              120 ml   Output              500 ml   Net             -380 ml          Physical Exam:   General:  Lethargic, opens eyes to stimuli, no acute distress, appears stated age. Head:  Normocephalic, without obvious abnormality, atraumatic. Eyes:  Conjunctivae/corneas clear. Nose: Nares normal. Septum midline. Mucosa normal.   Throat: Lips, mucosa, and tongue normal.    Neck: Supple, symmetrical, trachea midline, no adenopathy. Lungs:   Diminished and poor inspiratory effort, but clear to auscultation bilaterally. Protecting airway. Chest wall:  No tenderness or deformity.    Heart:  Regular rate and rhythm, S1, S2 normal, no murmur, click, rub or gallop. Abdomen:   Soft, non-tender. Bowel sounds normal. No masses,  No organomegaly. Extremities: Extremities normal, atraumatic, no cyanosis or edema. Contracted. Pulses: 2+ and symmetric all extremities. Skin: Skin color, texture, turgor normal. No rashes or lesions   Lymph nodes: Cervical, supraclavicular nodes normal.   Neurologic: Unable to evaluate     DATA:  MAR reviewed and pertinent medications noted or modified as needed    Labs:  Recent Labs      07/10/17   0414  07/09/17   0400  07/08/17   1422   WBC  6.3  5.6  7.7   HGB  10.3*  9.9*  12.0*   HCT  30.3*  30.6*  36.7   PLT  118*  113*  164     Recent Labs      07/10/17   0414 07/09/17   0400  07/08/17   1422   NA  136  138  135*   K  3.1*  3.3*  3.8   CL  99  106  96*   CO2  31  27  29   GLU  106*  113*  99   BUN  5*  8  23*   CREA  0.39*  0.37*  0.96   CA  8.1*  8.1*  8.5   MG  1.3*  1.4*   --    PHOS   --   1.8*   --    ALB  2.6*  2.4*  3.4*   TBILI  0.2  0.1*  0.2   SGOT  32  33  39*   ALT  31  26  31       Imaging:  No new images this morning. IMPRESSION:   1. Acute hypoxic respiratory failure  2. Bronchitis vs. PNA; sputum with enterobacter  3. Cerebral palsy  4. Hx of aspiration PNA  5. Seizure disorder      PLAN:   · Continue Maria Eugenia mask and wean O2 as able to keep sats > 90%  · Switch Zosyn to Levaquin given culture sensitivities and lack of improvement. Can't use meropenem due to seizure disorder. Discussed with pharmacy. F/U blood cx. · Speech following; swallow eval on hold due to lethargy and decreased responsiveness. · Continue Duonebs and Mucocyst/Xopenex nebs  · Valproate per primary team/pharmacy  · No new labs today and K and Mag low yesterday. Will recheck now.   · DVT prophylaxis: sub q heparin  · GI prophylaxis: Protonix         Jade Amato

## 2017-07-11 NOTE — ROUTINE PROCESS
Bedside and Verbal shift change report given to Johanny Cedeño RN (oncoming nurse) by Noy Hardwick RN (offgoing nurse). Report included the following information SBAR, Kardex, Intake/Output, MAR, Accordion, Recent Results, Cardiac Rhythm SR/ST and Alarm Parameters .

## 2017-07-11 NOTE — PROGRESS NOTES
Occupational Therapy Note:  Orders acknowledged, chart reviewed, and spoke with nursing. Patient received in bed with aunt at bedside; She confirms her sister is patient's mother/primary caregiver. She also reports patient's brother provides assistance/care at home. Patient himself is unable to follow commands or engage in activity for full assessment. Extensive discussion held with pt's aunt regarding pt's prior level of function, equipment, and home situation. Per pt's aunt, he follows commands to assist mother with bed to/from chair transfers (wheelchair and/or recliner), otherwise is dependent for mobility. She confirms he is completely dependent for ADLs and has been for quite some time. Patient's aunt is unable identify any equipment needs and confirms he will continue to have assistance for ADLs upon return home. Will complete order as there are no OT needs.     Devorah Christian, OTR/L

## 2017-07-11 NOTE — PROGRESS NOTES
Speech Therapy    Note patient remains on ventimask. Will f/u once respiratory status and alertness have improved for PO trials. Thank you. ALYSE GaribayS., CCC-SLP

## 2017-07-12 ENCOUNTER — APPOINTMENT (OUTPATIENT)
Dept: GENERAL RADIOLOGY | Age: 52
DRG: 871 | End: 2017-07-12
Payer: MEDICARE

## 2017-07-12 LAB
ANION GAP BLD CALC-SCNC: 7 MMOL/L (ref 5–15)
BASOPHILS # BLD AUTO: 0 K/UL (ref 0–0.1)
BASOPHILS # BLD: 0 % (ref 0–1)
BUN SERPL-MCNC: 7 MG/DL (ref 6–20)
BUN/CREAT SERPL: 21 (ref 12–20)
CALCIUM SERPL-MCNC: 8.4 MG/DL (ref 8.5–10.1)
CHLORIDE SERPL-SCNC: 97 MMOL/L (ref 97–108)
CO2 SERPL-SCNC: 28 MMOL/L (ref 21–32)
CREAT SERPL-MCNC: 0.34 MG/DL (ref 0.7–1.3)
EOSINOPHIL # BLD: 0 K/UL (ref 0–0.4)
EOSINOPHIL NFR BLD: 0 % (ref 0–7)
ERYTHROCYTE [DISTWIDTH] IN BLOOD BY AUTOMATED COUNT: 11.9 % (ref 11.5–14.5)
GLUCOSE SERPL-MCNC: 68 MG/DL (ref 65–100)
HCT VFR BLD AUTO: 30.7 % (ref 36.6–50.3)
HGB BLD-MCNC: 10.2 G/DL (ref 12.1–17)
LYMPHOCYTES # BLD AUTO: 15 % (ref 12–49)
LYMPHOCYTES # BLD: 0.4 K/UL (ref 0.8–3.5)
MAGNESIUM SERPL-MCNC: 1.5 MG/DL (ref 1.6–2.4)
MCH RBC QN AUTO: 31.7 PG (ref 26–34)
MCHC RBC AUTO-ENTMCNC: 33.2 G/DL (ref 30–36.5)
MCV RBC AUTO: 95.3 FL (ref 80–99)
MONOCYTES # BLD: 0.4 K/UL (ref 0–1)
MONOCYTES NFR BLD AUTO: 14 % (ref 5–13)
NEUTS SEG # BLD: 1.9 K/UL (ref 1.8–8)
NEUTS SEG NFR BLD AUTO: 71 % (ref 32–75)
PLATELET # BLD AUTO: 100 K/UL (ref 150–400)
POTASSIUM SERPL-SCNC: 4.1 MMOL/L (ref 3.5–5.1)
RBC # BLD AUTO: 3.22 M/UL (ref 4.1–5.7)
SODIUM SERPL-SCNC: 132 MMOL/L (ref 136–145)
WBC # BLD AUTO: 2.7 K/UL (ref 4.1–11.1)

## 2017-07-12 PROCEDURE — 74011250636 HC RX REV CODE- 250/636: Performed by: FAMILY MEDICINE

## 2017-07-12 PROCEDURE — 36415 COLL VENOUS BLD VENIPUNCTURE: CPT | Performed by: PHYSICIAN ASSISTANT

## 2017-07-12 PROCEDURE — 74011000258 HC RX REV CODE- 258: Performed by: FAMILY MEDICINE

## 2017-07-12 PROCEDURE — 74011000250 HC RX REV CODE- 250: Performed by: FAMILY MEDICINE

## 2017-07-12 PROCEDURE — 94640 AIRWAY INHALATION TREATMENT: CPT

## 2017-07-12 PROCEDURE — 74011250636 HC RX REV CODE- 250/636: Performed by: INTERNAL MEDICINE

## 2017-07-12 PROCEDURE — 94668 MNPJ CHEST WALL SBSQ: CPT

## 2017-07-12 PROCEDURE — 83735 ASSAY OF MAGNESIUM: CPT | Performed by: PHYSICIAN ASSISTANT

## 2017-07-12 PROCEDURE — 74011250636 HC RX REV CODE- 250/636: Performed by: PHYSICIAN ASSISTANT

## 2017-07-12 PROCEDURE — 92611 MOTION FLUOROSCOPY/SWALLOW: CPT

## 2017-07-12 PROCEDURE — 92610 EVALUATE SWALLOWING FUNCTION: CPT

## 2017-07-12 PROCEDURE — 65660000000 HC RM CCU STEPDOWN

## 2017-07-12 PROCEDURE — 85025 COMPLETE CBC W/AUTO DIFF WBC: CPT | Performed by: PHYSICIAN ASSISTANT

## 2017-07-12 PROCEDURE — 74230 X-RAY XM SWLNG FUNCJ C+: CPT

## 2017-07-12 PROCEDURE — 74011000250 HC RX REV CODE- 250: Performed by: INTERNAL MEDICINE

## 2017-07-12 PROCEDURE — C9113 INJ PANTOPRAZOLE SODIUM, VIA: HCPCS | Performed by: INTERNAL MEDICINE

## 2017-07-12 PROCEDURE — 80048 BASIC METABOLIC PNL TOTAL CA: CPT | Performed by: PHYSICIAN ASSISTANT

## 2017-07-12 PROCEDURE — 77010033678 HC OXYGEN DAILY

## 2017-07-12 RX ORDER — MAGNESIUM SULFATE HEPTAHYDRATE 40 MG/ML
2 INJECTION, SOLUTION INTRAVENOUS ONCE
Status: COMPLETED | OUTPATIENT
Start: 2017-07-12 | End: 2017-07-12

## 2017-07-12 RX ORDER — SODIUM CHLORIDE 9 MG/ML
75 INJECTION, SOLUTION INTRAVENOUS CONTINUOUS
Status: DISCONTINUED | OUTPATIENT
Start: 2017-07-12 | End: 2017-07-14

## 2017-07-12 RX ADMIN — VALPROATE SODIUM 750 MG: 100 INJECTION, SOLUTION INTRAVENOUS at 21:34

## 2017-07-12 RX ADMIN — LEVALBUTEROL INHALATION 1.25MG/3ML 1.25 MG: 1.25 SOLUTION RESPIRATORY (INHALATION) at 08:00

## 2017-07-12 RX ADMIN — VALPROATE SODIUM 625 MG: 100 INJECTION, SOLUTION INTRAVENOUS at 09:00

## 2017-07-12 RX ADMIN — DIPHENHYDRAMINE HYDROCHLORIDE 12.5 MG: 50 INJECTION, SOLUTION INTRAMUSCULAR; INTRAVENOUS at 12:05

## 2017-07-12 RX ADMIN — PHENOBARBITAL SODIUM 30 MG: 65 INJECTION INTRAMUSCULAR; INTRAVENOUS at 21:12

## 2017-07-12 RX ADMIN — HEPARIN SODIUM 5000 UNITS: 5000 INJECTION, SOLUTION INTRAVENOUS; SUBCUTANEOUS at 08:05

## 2017-07-12 RX ADMIN — HEPARIN SODIUM 5000 UNITS: 5000 INJECTION, SOLUTION INTRAVENOUS; SUBCUTANEOUS at 21:13

## 2017-07-12 RX ADMIN — DIPHENHYDRAMINE HYDROCHLORIDE 12.5 MG: 50 INJECTION, SOLUTION INTRAMUSCULAR; INTRAVENOUS at 21:35

## 2017-07-12 RX ADMIN — Medication 10 ML: at 14:00

## 2017-07-12 RX ADMIN — SODIUM CHLORIDE 100 ML/HR: 900 INJECTION, SOLUTION INTRAVENOUS at 10:00

## 2017-07-12 RX ADMIN — LEVOFLOXACIN 750 MG: 5 INJECTION, SOLUTION INTRAVENOUS at 08:04

## 2017-07-12 RX ADMIN — SODIUM CHLORIDE 40 MG: 9 INJECTION INTRAMUSCULAR; INTRAVENOUS; SUBCUTANEOUS at 08:04

## 2017-07-12 RX ADMIN — ACETYLCYSTEINE 400 MG: 200 SOLUTION ORAL; RESPIRATORY (INHALATION) at 19:11

## 2017-07-12 RX ADMIN — MAGNESIUM SULFATE IN WATER 2 G: 40 INJECTION, SOLUTION INTRAVENOUS at 10:00

## 2017-07-12 RX ADMIN — Medication 10 ML: at 21:12

## 2017-07-12 RX ADMIN — LEVALBUTEROL INHALATION 1.25MG/3ML 1.25 MG: 1.25 SOLUTION RESPIRATORY (INHALATION) at 19:11

## 2017-07-12 RX ADMIN — PHENOBARBITAL SODIUM 30 MG: 65 INJECTION INTRAMUSCULAR; INTRAVENOUS at 08:05

## 2017-07-12 RX ADMIN — ACETYLCYSTEINE 400 MG: 200 SOLUTION ORAL; RESPIRATORY (INHALATION) at 07:31

## 2017-07-12 NOTE — PROGRESS NOTES
Problem: Dysphagia (Adult)  Goal: *Acute Goals and Plan of Care (Insert Text)  Swallowing goals initiated 7-12-17:  1) MBS to DETErmine the severity of dysphagia and help with goals of care by 7-15-17  [address] EVALUATION  Patient: Rangel Melendez (89 y.o. male)  Date: 7/12/2017  Primary Diagnosis: Sepsis ([facility]        Precautions: aspiration  Fall, WBAT      ASSESSMENT :  Based on the objective data described below, the patient presents with moderate pharyngeal weakness. Unable to r/o pharyngeal residue and silent aspriation. Suggest MBS if family needs help with goals of care: PEG vs comfort care. Patient will benefit from skilled intervention to address the above impairments. Patients rehabilitation potential is considered to be Fair  Factors which may influence rehabilitation potential include:   [ ]            None noted  [X]            Mental ability/status  [X]            Medical condition  [ ]            Home/family situation and support systems  [ ]            Safety awareness  [ ]            Pain tolerance/management  [ ]            Other:        PLAN :  Recommendations and Planned Interventions:  MBS to determine severity of dysphagia   Frequency/Duration: Patient will be followed by speech-language pathology 3 times a week to address goals. Discharge Recommendations: To Be Determined       SUBJECTIVE:   Patient stated ummmmmmm.       OBJECTIVE:       Past Medical History:   Diagnosis Date    Aspiration pneumonia ([address]      Cerebral palsy (HCC)      Ill-defined condition       dsyphasia    Scoliosis      Seizure disorder (HCC)      Seizures (HCC)       Past Surgical History:   Procedure Laterality Date    HX HEENT         Prior Level of Function/Home Situation:   Home Situation  Home Environment: Private residence  # Steps to Enter: 0  Rails to Enter: No  Wheelchair Ramp: No  One/Two Story Residence: One story  Living Alone: No  Support Systems: Family member(s)  Patient Expects to be Discharged to[de-identified] Private residence  Current DME Used/Available at Home: Hospital bed, Commode, bedside, Walker, rolling, Wheelchair, Wheelchair, power, Shower chair  Diet prior to admission: purees, HTL  Current Diet:  NPO, considering TPN   Cognitive and Communication Status:  Neurologic State: Alert  Orientation Level: Unable to verbalize  Cognition: Decreased command following  Perception:  (reduced eye contact)  Perseveration: No perseveration noted  Safety/Judgement: Not assessed  Oral Assessment:  Oral Assessment  Labial:  (opening mouth. awake)  Dentition: Natural (in fair to good condition, but not oral care by dentist for 2 years)  Lingual: No impairment  Mandible: No impairment  P.O. Trials:  Patient Position: uprighr in bed  Vocal quality prior to P.O.: No impairment  Consistency Presented: Puree  How Presented: SLP-fed/presented;Spoon   only tried purees due to h/o dysphagia 1, honey thick liquids. ORAL PHASE:      Bolus Acceptance:  (some slow response to spoon)  Bolus Formation/Control: No impairment (for purees)        Oral Residue: None   PHARYNGEAL PHASE:   Initiation of Swallow: No impairment  Laryngeal Elevation: Weak (modeate-he consistently swallowed 4x/bolus of purees. this is usually indicative of weakness and pharygneal residue)     Pharyngeal Phase Characteristics: Multiple swallows    He is not able to cough or verbalize on request.    Mother reports that he used to cough when he was aspirating and then they would take patient to GP and get anti-biotics. This time, there was no warning coughs,just lethargy. Concerns for silent aspiration                    NOMS:   The NOMS functional outcome measure was used to quantify this patient's level of swallowing impairment. Based on the NOMS, the patient was determined to be at level 2 for swallow function      G Codes:   In compliance with CMSs Claims Based Outcome Reporting, the following G-code set was chosen for this patient based the use of the NOMS functional outcome to quantify this patient's level of swallowing impairment. Using the NOMS, the patient was determined to be at level 2 for swallow function which correlates with the CM= 80-99% level of severity. Based on the objective assessment provided within this note, the current, goal, and discharge g-codes are as follows:     Swallow  Swallowing:   Swallow Current Status CM= 80-99%   Swallow Goal Status CK= 40-59%         NOMS Swallowing Levels:  Level 1 (CN): NPO  Level 2 (CM): NPO but takes consistency in therapy  Level 3 (CL): Takes less than 50% of nutrition p.o. and continues with nonoral feedings; and/or safe with mod cues; and/or max diet restriction  Level 4 (CK): Safe swallow but needs mod cues; and/or mod diet restriction; and/or still requires some nonoral feeding/supplements  Level 5 (CJ): Safe swallow with min diet restriction; and/or needs min cues  Level 6 (CI): Independent with p.o.; rare cues; usually self cues; may need to avoid some foods or needs extra time  Level 7 ([facility]): Independent for all p.o.  TRINA. (2003). National Outcomes Measurement System (NOMS): Adult Speech-Language Pathology User's Guide. Pain:  Pain Scale 1: Adult Nonverbal Pain Scale  Pain Intensity 1: 0     After treatment:   [ ]            Patient left in no apparent distress sitting up in chair  [ ]            Patient left in no apparent distress in bed  [ ]            Call bell left within reach  [ ]            Nursing notified  [ ]            Caregiver present  [ ]            Bed alarm activated      COMMUNICATION/EDUCATION:   The patients plan of care including recommendations, planned interventions, and recommended diet changes were discussed with: Registered Nurse. Patient was educated regarding His deficit(s) of dysphagia  as this relates to His diagnosis of CP, aspiration PNA.   He demonstrated Guarded understanding as evidenced by nonverbal. .  [ ]            Posted safety precautions in patient's room. [X]            Patient/family have participated as able in goal setting and plan of care. [X]            Patient/family agree to work toward stated goals and plan of care. [ ]            Patient understands intent and goals of therapy, but is neutral about his/her participation. [ ]            Patient is unable to participate in goal setting and plan of care.      Thank you for this referral.  Steven Medina, SLP  Time Calculation: 15 mins

## 2017-07-12 NOTE — PROGRESS NOTES
Physical Therapy Note:     Chart reviewed, pt cleared for PT by RN, PT evaluation attempted and deferred. Pt resting quietly and family (son and step-father) requesting pt not be aroused for evaluation at this time. They report pt is very fatigued following MBS earlier today and when he feels well he is very vocal and interactive. Discussed home mobility and reports offered by pt's aunt yesterday are confirmed by pt's son and step-father. They clarify that pt performs 2-3 steps when pivoting to chair but requires total assistance for stability and balance. Family states familiarity with mechanical lifts through other family members and they are not interested in a lift system for patient at this time. They report pt is typically able to sit unsupported with supervision. Agree with family to follow-up next treatment day for attempted PT evaluation.      Arsen Artis, PT, DPT  11 mins

## 2017-07-12 NOTE — CONSULTS
Gastroenterology Consultation Note      Admit Date: 7/8/2017  Consult Date: 7/12/2017   I greatly appreciate your asking me to see Lilliam Krueger, thank you very much for the opportunity to participate in his care. Narrative Assessment and Plan   · Dysphagia  · Recurrent episodes of aspiration PNA  · Cerebral palsy    Speech evaluation noted and patient has moderate pharyngeal weakness. Not able to r/o silent aspiration. Dr. Leander Rasmussen discussed with brother option of PEG and further discussion is going to made with other family members before final decision is made. In the interim TPN for nutritional support, will order PICC line    11:29 AM - discussed with Virginia from radiology, plan is for Hones catheter rather than PICC due to increased risk of clots given his contractures (reviewed with Dr. Leander Rasmussen also)    Subjective:     Chief Complaint: not able to obtain    History of Present Illness: Lilliam Krueger is a 47 y.o. male with cerebral palsy. He is not able to provide any meaningful history or review of systems. No family at bedside. GI service has been asked to evaluate patient for TPN and possible PEG placement. Patient was admitted 7/8/17 with fever, sepsis and possible aspiration PNA. During hospitalization patient has not been able to complete swallow evaluation. Per review of records he has a history of dysphagia and already on modified diet. Has had multiple episodes of aspiration PNA. Discussed with brother; patient has long term intermittent cough, choking with meals.   Long term constipation so far sucessfully treated with dietary fiber or OTC laxatives    PCP:  Elizabeth Woodard MD    Past Medical History:   Diagnosis Date    Aspiration pneumonia ([address]     Cerebral palsy ([address]     Ill-defined condition     dsyphasia    Scoliosis     Seizure disorder ([address]     Seizures ([address]         Past Surgical History:   Procedure Laterality Date    HX HEENT         Social History   Substance Use Topics    Smoking status: Never Smoker    Smokeless tobacco: Never Used    Alcohol use No        Family History   Problem Relation Age of Onset    Cancer Father      prostate    Cancer Maternal Aunt     Heart Disease Maternal Aunt     Cancer Maternal Grandmother     Cancer Maternal Grandfather     Cancer Paternal Grandmother     Cancer Paternal Grandfather     Cancer Other         Allergies   Allergen Reactions    Other Medication Swelling     Mother states the patient had a reaction to an antibiotic as a child  She cannot recall the name of the antibiotic nor the indication   Per [facility] [phone] the patient has had prescriptions for Augmentin  Tolerates Cefdinir. Home Medications:  Prior to Admission Medications   Prescriptions Last Dose Informant Patient Reported? Taking? DOCUSATE CALCIUM (STOOL SOFTENER PO)  Parent Yes Yes   Sig: Take 1 tablet by mouth two (2) times daily as needed. PHENobarbital (LUMINAL) 30 mg tablet 7/8/2017 at AM Parent Yes Yes   Sig: Take 30 mg by mouth two (2) times a day. acetaminophen (TYLENOL) 500 mg tablet 7/8/2017 at AM Parent Yes Yes   Sig: Take 1,000 mg by mouth every six (6) hours as needed for Pain. ascorbic acid, vitamin C, powd 7/8/2017 at Unknown time Parent Yes Yes   Sig: Take 1,000 mg by mouth daily. Mix with liquid prior to administration. divalproex DR (DEPAKOTE) 125 mg tablet 7/8/2017 at AM Parent Yes Yes   Sig: Take 125 mg by mouth daily. divalproex DR (DEPAKOTE) 125 mg tablet 7/7/2017 at PM Parent Yes Yes   Sig: Take 250 mg by mouth every evening. divalproex DR (DEPAKOTE) 500 mg tablet 7/8/2017 at AM Parent No Yes   Sig: Take 1 Tab by mouth two (2) times a day. guaiFENesin-dextromethorphan (G-FENESIN DM)  mg tab tablet 7/8/2017 at AM Parent Yes Yes   Sig: Take 1 Tab by mouth every four (4) hours as needed. omeprazole (PRILOSEC) 20 mg capsule 7/8/2017 at AM Parent Yes Yes   Sig: Take 20 mg by mouth Before breakfast and dinner.    triamcinolone acetonide (KENALOG) 0.1 % topical cream  Parent Yes No   Sig: Apply  to affected area two (2) times daily as needed for Skin Irritation.  use thin layer      Facility-Administered Medications: None       Hospital Medications:  Current Facility-Administered Medications   Medication Dose Route Frequency    0.9% sodium chloride infusion  100 mL/hr IntraVENous CONTINUOUS    magnesium sulfate 2 g/50 ml IVPB (premix or compounded)  2 g IntraVENous ONCE    levoFLOXacin (LEVAQUIN) 750 mg in D5W IVPB  750 mg IntraVENous DAILY    PHENobarbital (LUMINAL) injection 30 mg  30 mg IntraVENous Q12H    heparin (porcine) injection 5,000 Units  5,000 Units SubCUTAneous Q12H    pantoprazole (PROTONIX) 40 mg in sodium chloride 0.9 % 10 mL injection  40 mg IntraVENous DAILY    acetylcysteine (MUCOMYST) 200 mg/mL (20 %) solution 400 mg  2 mL Nebulization BID RT    levalbuterol (XOPENEX) nebulizer soln 1.25 mg/3 mL  1.25 mg Nebulization BID RT    albuterol-ipratropium (DUO-NEB) 2.5 MG-0.5 MG/3 ML  3 mL Nebulization Q4H PRN    ascorbic acid (vitamin C) (VITAMIN C) tablet 500 mg  500 mg Oral BID    polyethylene glycol (MIRALAX) packet 17 g  17 g Oral DAILY PRN    sodium chloride (NS) flush 5-10 mL  5-10 mL IntraVENous Q8H    sodium chloride (NS) flush 5-10 mL  5-10 mL IntraVENous PRN    HYDROmorphone (PF) (DILAUDID) injection 0.2 mg  0.2 mg IntraVENous Q4H PRN    naloxone (NARCAN) injection 0.4 mg  0.4 mg IntraVENous PRN    diphenhydrAMINE (BENADRYL) injection 12.5 mg  12.5 mg IntraVENous Q4H PRN    ondansetron (ZOFRAN) injection 4 mg  4 mg IntraVENous Q6H PRN    docusate sodium (COLACE) capsule 100 mg  100 mg Oral BID    acetaminophen (TYLENOL) suppository 650 mg  650 mg Rectal Q4H PRN    diphenhydrAMINE (BENADRYL) injection 12.5 mg  12.5 mg IntraVENous QHS    valproate (DEPACON) 750 mg in 0.9% sodium chloride 50 mL IVPB  750 mg IntraVENous Q24H    valproate (DEPACON) 625 mg in 0.9% sodium chloride 50 mL IVPB  625 mg IntraVENous Q24H       Review of Systems: Not able to obtain due to mental status    Objective:     Physical Exam:  Visit Vitals    BP 90/63    Pulse 61    Temp 97.5 °F (36.4 °C)    Resp 16    Ht 5' 7\" (1.702 m)    Wt 40.2 kg (88 lb 10 oz)    SpO2 97%    BMI 13.88 kg/m2     SpO2 Readings from Last 6 Encounters:   07/12/17 97%   03/10/16 100%   03/06/16 98%   09/06/14 99%   09/04/14 94%   11/22/13 99%    O2 Flow Rate (L/min): 8 l/min     Intake/Output Summary (Last 24 hours) at 07/12/17 1039  Last data filed at 07/12/17 0617   Gross per 24 hour   Intake              100 ml   Output              850 ml   Net             -750 ml      General: no distress, comfortable, opens eyes, not able to engage in conversation  HEENT: Pupils equal, sclera anicteric  Cardiovascular: regular rate and rhythm  Respiratory:  No abnormal audible breath sounds, normal respiratory effort, no throacic deformity  GI:  Bowel sounds present, soft, nondistended  Musculoskeletal: contractures noted  Psychiatric:  Not able to assess    Laboratory:    Recent Results (from the past 24 hour(s))   EKG, 12 LEAD, INITIAL    Collection Time: 07/11/17 10:52 AM   Result Value Ref Range    Ventricular Rate 97 BPM    Atrial Rate 98 BPM    QRS Duration 64 ms    Q-T Interval 344 ms    QTC Calculation (Bezet) 436 ms    Calculated R Axis 73 degrees    Calculated T Axis 59 degrees    Diagnosis       ** Poor data quality, interpretation may be adversely affected  Normal sinus rhythm  Normal ECG  Confirmed by Lis Alicia MD., James ([id number]) on 7/11/2017 7:21:37 PM     EKG, 12 LEAD, INITIAL    Collection Time: 07/11/17 10:56 AM   Result Value Ref Range    Ventricular Rate 83 BPM    Atrial Rate 83 BPM    P-R Interval 130 ms    QRS Duration 70 ms    Q-T Interval 354 ms    QTC Calculation (Bezet) 415 ms    Calculated P Axis 57 degrees    Calculated R Axis 82 degrees    Calculated T Axis 79 degrees    Diagnosis       Normal sinus rhythm  Normal ECG  Confirmed by Lis Alicia MD., Minnie May ([id number]) on 7/11/2017 9:08:03 PM     METABOLIC PANEL, BASIC    Collection Time: 07/12/17  1:50 AM   Result Value Ref Range    Sodium 132 (L) 136 - 145 mmol/L    Potassium 4.1 3.5 - 5.1 mmol/L    Chloride 97 97 - 108 mmol/L    CO2 28 21 - 32 mmol/L    Anion gap 7 5 - 15 mmol/L    Glucose 68 65 - 100 mg/dL    BUN 7 6 - 20 MG/DL    Creatinine 0.34 (L) 0.70 - 1.30 MG/DL    BUN/Creatinine ratio 21 (H) 12 - 20      GFR est AA >60 >60 ml/min/1.73m2    GFR est non-AA >60 >60 ml/min/1.73m2    Calcium 8.4 (L) 8.5 - 10.1 MG/DL   MAGNESIUM    Collection Time: 07/12/17  1:50 AM   Result Value Ref Range    Magnesium 1.5 (L) 1.6 - 2.4 mg/dL   CBC WITH AUTOMATED DIFF    Collection Time: 07/12/17  1:50 AM   Result Value Ref Range    WBC 2.7 (L) 4.1 - 11.1 K/uL    RBC 3.22 (L) 4.10 - 5.70 M/uL    HGB 10.2 (L) 12.1 - 17.0 g/dL    HCT 30.7 (L) 36.6 - 50.3 %    MCV 95.3 80.0 - 99.0 FL    MCH 31.7 26.0 - 34.0 PG    MCHC 33.2 30.0 - 36.5 g/dL    RDW 11.9 11.5 - 14.5 %    PLATELET 023 (L) 198 - 400 K/uL    NEUTROPHILS 71 32 - 75 %    LYMPHOCYTES 15 12 - 49 %    MONOCYTES 14 (H) 5 - 13 %    EOSINOPHILS 0 0 - 7 %    BASOPHILS 0 0 - 1 %    ABS. NEUTROPHILS 1.9 1.8 - 8.0 K/UL    ABS. LYMPHOCYTES 0.4 (L) 0.8 - 3.5 K/UL    ABS. MONOCYTES 0.4 0.0 - 1.0 K/UL    ABS. EOSINOPHILS 0.0 0.0 - 0.4 K/UL    ABS. BASOPHILS 0.0 0.0 - 0.1 K/UL     CT:LIVER: Postcholecystectomy. There is no intrahepatic duct dilatation. The CBD is  not dilated. There is no hepatic parenchymal mass. Hepatic enhancement pattern  is within normal limits. The portal vein is patent.     SPLEEN/PANCREAS: No splenic mass. . There is no pancreatic mass. There is no  pancreatic duct dilatation. There is no evidence of splenomegaly.      ADRENALS/KIDNEYS: Tiny hypodensities are consistent with simple cyst.. There is  no adrenal mass. There is no hydroureter or hydronephrosis. There is no renal  mass. There is no perinephric mass.  No renal ureteral or bladder calculus.     COLON AND SMALL BOWEL: Large rectal stool ball. Colonic stasis. Fluid-filled  loops of large and small bowel consistent with an enteritis. There is no  obstruction or free intraperitoneal air. There is no evidence of incarceration  or obstruction. No mesenteric adenopathy. No retroperitoneal adenopathy. APPENDIX: Unremarkable    Assessment/Plan:     Principal Problem:    Sepsis ([facility] (7/8/2017)    Active Problems:    Cerebral palsy ([facility] (6/15/2012)      Aspiration pneumonia ([facility] (7/8/2017)      Hypotension (7/8/2017)      Acute encephalopathy (7/8/2017)     Constipation    See above narrative for full detail. Satinder Dyson PA-C  07/12/17  10:39 AM    Will begin alternative feeding efforts of brother feels patient is worse than his usual stae that includes periodic choking, cough with oral feeding. Brother acknowledges chronic constipation and wishes continue usual periodic treatment of diet alteration with or without laxatives.     I have interviewed and examined patient with addendum to note above and formulation care plan      Mika Brown M.D.

## 2017-07-12 NOTE — PROGRESS NOTES
7:00 AM  Bedside shift change report given to Antony (oncoming nurse) by Vito Mcgowan RN (offgoing nurse). Report included the following information SBAR, Kardex and Recent Results.

## 2017-07-12 NOTE — PROGRESS NOTES
9739 Patient transitioned to room air. Oxygen saturation maintained >90% . Will continue to monitor.

## 2017-07-12 NOTE — PROGRESS NOTES
Speech Pathology Modified barium swallow Study  Patient: Adis Mckenna (47 y.o. male)  Date: 7/12/2017  Primary Diagnosis: Sepsis ([address]        Precautions: aspiration  Fall, WBAT    ASSESSMENT :  Based on the objective data described below, the patient presents with mild-moderate oral-pharyngeal dysphagia with weakness and mild delay. He is probably close to his baseline dysphagia, which involves toleration of purees, and honey thick liquids with moderate aspiration risks. Suspect he may have aspiration at times. During this study, none was noted, so uncertain if, when it occurs, it is silent or not. He is much more alert today. Spoke with brother about current level of dysphagia and his aspiration risks. He hopes to have his brother try and eat since he is probably similar to his baseline. Moderate aspiration risk factors, but he will continue to have risks if PEG completed. Did recommend a dental cleaning at this time. Patient will benefit from skilled intervention to address the above impairments. Patients rehabilitation potential is considered to be Fair  Factors which may influence rehabilitation potential include:   []              None noted  [x]              Mental ability/status  [x]              Medical condition  []              Home/family situation and support systems  []              Safety awareness  []              Pain tolerance/management  []              Other:      PLAN :  Recommendations and Planned Interventions:  Dysphagia 1, honey thick liquids  Frequency/Duration: Patient will be followed by speech-language pathology 3 times a week to address goals. Discharge Recommendations: Home Health     SUBJECTIVE:   Patient's brother attended MBS with patient.     OBJECTIVE:     Past Medical History:   Diagnosis Date    Aspiration pneumonia ([address]     Cerebral palsy ([address]     Ill-defined condition     dsyphasia    Scoliosis     Seizure disorder ([address]     Seizures ([facility])      Past Surgical History:   Procedure Laterality Date    HX HEENT       Prior Level of Function/Home Situation:   Home Situation  Home Environment: Private residence  # Steps to Enter: 0  Rails to Enter: No  Wheelchair Ramp: No  One/Two Story Residence: One story  Living Alone: No  Support Systems: Family member(s)  Patient Expects to be Discharged to[de-identified] Private residence  Current DME Used/Available at Home: Hospital bed, Commode, bedside, Walker, rolling, Wheelchair, Wheelchair, power, Shower chair  Diet prior to admission: dyspahgia 1, honey thick liquids   Current Diet:  NPO   Radiologist: Mehrdad   Film Views: Lateral  Patient Position: upright in Hausted chair    Trial 1: Trial 2:   Consistency Presented: Honey thick liquid;Pudding     How Presented: Self-fed/presented;Spoon      ORAL PHASE:      Bolus Acceptance:  (some slow closing around spoon)     Bolus Formation/Control: Impaired (piecemeal deglutition with purees. this is 1 reason whey he swallowed so many times per bolus): Piecemeal;Anterior;Posterior  :     Propulsion: Tongue pumping;Delayed (# of seconds)     Oral Residue: Lingual (mild-moderate, that spilled to pharynx after the swallow)   PHARYNGEAL PHASE:      Initiation of Swallow: Triggered at vallecula usually with purees. Timing: Pooling 1-5 sec [facility])     Penetration: None     Aspiration/Timing: No evidence of aspiration     Pharyngeal Clearance: Vallecular residue (varied: 10%; but sometimes 70%. he did double swallow, but not immediately. second swallow cleared residue) this is the second reason for 4 swallows after swallow.       Attempted Modifications: Double swallow     Effective Modifications: Double swallow                   Trial 3: Trial 4:                            :    :                                                                        Decreased Tongue Base Retraction?: Yes  Laryngeal Elevation:  (mildly reduced laryngeal elevation and excursion)  Aspiration/Penetration Score: 1 (No penetration or aspiration-Contrast does not enter the airway)  Pharyngeal Symmetry: Not assessed  Pharyngeal-Esophageal Segment: No impairment             NOMS:   The NOMS functional outcome measure was used to quantify this patient's level of swallowing impairment. Based on the NOMS, the patient was determined to be at level 4 for swallow function     G Codes: In compliance with CMSs Claims Based Outcome Reporting, the following G-code set was chosen for this patient based the use of the NOMS functional outcome to quantify this patient's level of swallowing impairment. Using the NOMS, the patient was determined to be at level 4 for swallow function which correlates with the CK= 40-59% level of severity. Based on the objective assessment provided within this note, the current, goal, and discharge g-codes are as follows:    Swallow  Swallowing:   Swallow Current Status CK= 40-59%   Swallow Goal Status CK= 40-59%        NOMS Swallowing Levels:  Level 1 (CN): NPO  Level 2 (CM): NPO but takes consistency in therapy  Level 3 (CL): Takes less than 50% of nutrition p.o. and continues with nonoral feedings; and/or safe with mod cues; and/or max diet restriction  Level 4 (CK): Safe swallow but needs mod cues; and/or mod diet restriction; and/or still requires some nonoral feeding/supplements  Level 5 (CJ): Safe swallow with min diet restriction; and/or needs min cues  Level 6 (CI): Independent with p.o.; rare cues; usually self cues; may need to avoid some foods or needs extra time  Level 7 ([address] [place] [address]): Independent for all p.o.  TRINA. (2003). National Outcomes Measurement System (NOMS): Adult Speech-Language Pathology User's Guide. COMMUNICATION/EDUCATION:   Patient was educated regarding His deficit(s) of dysphagia  as this relates to His diagnosis of pneumonia/CP. His brother  demonstrated Good understanding as evidenced by discussion. .    The patients plan of care including findings from MBS, recommendations, planned interventions, and recommended diet changes were discussed with: Registered Nurse and Physician. []  Posted safety precautions in patient's room. [x]  Patient/family have participated as able in goal setting and plan of care. [x]  Patient/family agree to work toward stated goals and plan of care. []  Patient understands intent and goals of therapy, but is neutral about his/her participation. []  Patient is unable to participate in goal setting and plan of care.     Thank you for this referral.Lila Hewitt, SLP  Time Calculation: 15 mins

## 2017-07-12 NOTE — PROGRESS NOTES
[facility] PROGRESS NOTE  Pulmonary, Critical Care, and Sleep Medicine    Name: John Neil MRN: [id number]   : 1965 Hospital: [facility]   Date: 2017  Admission Date: 2017     Chart and notes reviewed. Data reviewed. I review the patient's current medications in the medical record at each encounter. I have evaluated and examined the patient. Overnight events reviewed:  Afebrile  Sats 95-97% on RA  BP on the low side but stable  WBC 2.7 - decreased  Na 132  Mag 1.5  Resp cx with enterobacter    ROS: Unable to obtain due to nonverbal status and CP. No family at bedside currently. He is less lethargic today and opens eyes in response to voice. Vital Signs:  Visit Vitals    BP 90/63    Pulse 61    Temp 97.5 °F (36.4 °C)    Resp 16    Ht 5' 7\" (1.702 m)    Wt 40.2 kg (88 lb 10 oz)    SpO2 97%    BMI 13.88 kg/m2       O2 Device: Room air   O2 Flow Rate (L/min): 8 l/min   Temp (24hrs), Av.5 °F (36.4 °C), Min:97.1 °F (36.2 °C), Max:97.7 °F (36.5 °C)       Intake/Output:   Last shift:         Last 3 shifts: 07/10 1901 -  0700  In: 220 [I.V.:220]  Out: 1350 [Urine:1350]    Intake/Output Summary (Last 24 hours) at 17 0914  Last data filed at 17 0617   Gross per 24 hour   Intake              100 ml   Output              850 ml   Net             -750 ml          Physical Exam:   General:  Sleeping, but opens eyes to name, no acute distress, appears stated age. Head:  Normocephalic, without obvious abnormality, atraumatic. Eyes:  Conjunctivae/corneas clear. Nose: Nares normal. Septum midline. Mucosa normal.   Throat: Lips, mucosa, and tongue normal.    Neck: Supple, symmetrical, trachea midline, no adenopathy. Lungs:   Mildly diminished but clear to auscultation bilaterally. Protecting airway. Chest wall:  No tenderness or deformity. Heart:  Regular rate and rhythm, S1, S2 normal, no murmur, click, rub or gallop.    Abdomen: Soft, non-tender. Bowel sounds normal. No masses,  No organomegaly. Extremities: Extremities contracted atraumatic, no cyanosis or edema. Contracted. Pulses: 2+ and symmetric all extremities. Skin: Skin color, texture, turgor normal. No rashes or lesions   Lymph nodes: Cervical, supraclavicular nodes normal.   Neurologic: Unable to evaluate     DATA:  MAR reviewed and pertinent medications noted or modified as needed    Labs:  Recent Labs      07/12/17   0150  07/10/17   0414   WBC  2.7*  6.3   HGB  10.2*  10.3*   HCT  30.7*  30.3*   PLT  100*  118*     Recent Labs      07/12/17   0150  07/10/17   0414   NA  132*  136   K  4.1  3.1*   CL  97  99   CO2  28  31   GLU  68  106*   BUN  7  5*   CREA  0.34*  0.39*   CA  8.4*  8.1*   MG  1.5*  1.3*   ALB   --   2.6*   TBILI   --   0.2   SGOT   --   32   ALT   --   31       Imaging:  No new images this morning; 7/10 images with clear lungs      IMPRESSION:   1. Acute hypoxic respiratory failure  2. Bronchitis vs. PNA; sputum with enterobacter  3. Cerebral palsy  4. Hx of aspiration PNA  5. Seizure disorder      PLAN:   · O2 if needed to keep sats > 90%  · Start gentle NS IVF given soft BP, low Na, and NPO; watch for potential volume overlaoad  · Replete Mag  · Continue Levaquin and f/u blood cx.   · Speech following; swallow eval pending  · Continue Duonebs and Mucocyst/Xopenex nebs  · Valproate per primary team/pharmacy  · DVT prophylaxis: sub q heparin  · GI prophylaxis: Protonix         Thad Aly, [id number]

## 2017-07-12 NOTE — PROGRESS NOTES
Bedside and Verbal shift change report given to Candy (oncoming nurse) by Cristobal Manjarrez (offgoing nurse). Report included the following information SBAR, Kardex, MAR, Accordion and Recent Results.

## 2017-07-13 LAB
ANION GAP BLD CALC-SCNC: 10 MMOL/L (ref 5–15)
BASOPHILS # BLD AUTO: 0 K/UL (ref 0–0.1)
BASOPHILS # BLD: 0 % (ref 0–1)
BUN SERPL-MCNC: 9 MG/DL (ref 6–20)
BUN/CREAT SERPL: 26 (ref 12–20)
CALCIUM SERPL-MCNC: 8.3 MG/DL (ref 8.5–10.1)
CHLORIDE SERPL-SCNC: 103 MMOL/L (ref 97–108)
CO2 SERPL-SCNC: 27 MMOL/L (ref 21–32)
CREAT SERPL-MCNC: 0.34 MG/DL (ref 0.7–1.3)
DIFFERENTIAL METHOD BLD: ABNORMAL
EOSINOPHIL # BLD: 0 K/UL (ref 0–0.4)
EOSINOPHIL NFR BLD: 0 % (ref 0–7)
ERYTHROCYTE [DISTWIDTH] IN BLOOD BY AUTOMATED COUNT: 12.1 % (ref 11.5–14.5)
GLUCOSE SERPL-MCNC: 71 MG/DL (ref 65–100)
HCT VFR BLD AUTO: 32.8 % (ref 36.6–50.3)
HGB BLD-MCNC: 10.8 G/DL (ref 12.1–17)
LYMPHOCYTES # BLD AUTO: 13 % (ref 12–49)
LYMPHOCYTES # BLD: 0.3 K/UL (ref 0.8–3.5)
MAGNESIUM SERPL-MCNC: 1.5 MG/DL (ref 1.6–2.4)
MCH RBC QN AUTO: 31.8 PG (ref 26–34)
MCHC RBC AUTO-ENTMCNC: 32.9 G/DL (ref 30–36.5)
MCV RBC AUTO: 96.5 FL (ref 80–99)
MONOCYTES # BLD: 0.3 K/UL (ref 0–1)
MONOCYTES NFR BLD AUTO: 13 % (ref 5–13)
NEUTS SEG # BLD: 1.8 K/UL (ref 1.8–8)
NEUTS SEG NFR BLD AUTO: 74 % (ref 32–75)
PLATELET # BLD AUTO: 115 K/UL (ref 150–400)
POTASSIUM SERPL-SCNC: 3.7 MMOL/L (ref 3.5–5.1)
RBC # BLD AUTO: 3.4 M/UL (ref 4.1–5.7)
RBC MORPH BLD: ABNORMAL
SODIUM SERPL-SCNC: 140 MMOL/L (ref 136–145)
WBC # BLD AUTO: 2.4 K/UL (ref 4.1–11.1)

## 2017-07-13 PROCEDURE — 74011000250 HC RX REV CODE- 250: Performed by: INTERNAL MEDICINE

## 2017-07-13 PROCEDURE — 74011250636 HC RX REV CODE- 250/636: Performed by: PHYSICIAN ASSISTANT

## 2017-07-13 PROCEDURE — 74011250636 HC RX REV CODE- 250/636: Performed by: INTERNAL MEDICINE

## 2017-07-13 PROCEDURE — C9113 INJ PANTOPRAZOLE SODIUM, VIA: HCPCS | Performed by: INTERNAL MEDICINE

## 2017-07-13 PROCEDURE — 94640 AIRWAY INHALATION TREATMENT: CPT

## 2017-07-13 PROCEDURE — 74011250636 HC RX REV CODE- 250/636: Performed by: FAMILY MEDICINE

## 2017-07-13 PROCEDURE — 83735 ASSAY OF MAGNESIUM: CPT | Performed by: PHYSICIAN ASSISTANT

## 2017-07-13 PROCEDURE — 92526 ORAL FUNCTION THERAPY: CPT

## 2017-07-13 PROCEDURE — 36415 COLL VENOUS BLD VENIPUNCTURE: CPT | Performed by: PHYSICIAN ASSISTANT

## 2017-07-13 PROCEDURE — C1758 CATHETER, URETERAL: HCPCS

## 2017-07-13 PROCEDURE — 74011000258 HC RX REV CODE- 258: Performed by: FAMILY MEDICINE

## 2017-07-13 PROCEDURE — 74011250637 HC RX REV CODE- 250/637: Performed by: INTERNAL MEDICINE

## 2017-07-13 PROCEDURE — 85025 COMPLETE CBC W/AUTO DIFF WBC: CPT | Performed by: PHYSICIAN ASSISTANT

## 2017-07-13 PROCEDURE — 74011000250 HC RX REV CODE- 250: Performed by: FAMILY MEDICINE

## 2017-07-13 PROCEDURE — 80048 BASIC METABOLIC PNL TOTAL CA: CPT | Performed by: PHYSICIAN ASSISTANT

## 2017-07-13 PROCEDURE — 65660000000 HC RM CCU STEPDOWN

## 2017-07-13 RX ORDER — MAGNESIUM SULFATE HEPTAHYDRATE 40 MG/ML
2 INJECTION, SOLUTION INTRAVENOUS ONCE
Status: COMPLETED | OUTPATIENT
Start: 2017-07-13 | End: 2017-07-13

## 2017-07-13 RX ADMIN — SODIUM CHLORIDE 75 ML/HR: 900 INJECTION, SOLUTION INTRAVENOUS at 00:56

## 2017-07-13 RX ADMIN — Medication 10 ML: at 21:34

## 2017-07-13 RX ADMIN — VALPROATE SODIUM 750 MG: 100 INJECTION, SOLUTION INTRAVENOUS at 21:49

## 2017-07-13 RX ADMIN — SODIUM CHLORIDE 75 ML/HR: 900 INJECTION, SOLUTION INTRAVENOUS at 14:25

## 2017-07-13 RX ADMIN — PHENOBARBITAL SODIUM 30 MG: 65 INJECTION INTRAMUSCULAR; INTRAVENOUS at 09:04

## 2017-07-13 RX ADMIN — DIPHENHYDRAMINE HYDROCHLORIDE 12.5 MG: 50 INJECTION, SOLUTION INTRAMUSCULAR; INTRAVENOUS at 21:31

## 2017-07-13 RX ADMIN — LEVALBUTEROL INHALATION 1.25MG/3ML 1.25 MG: 1.25 SOLUTION RESPIRATORY (INHALATION) at 20:08

## 2017-07-13 RX ADMIN — LEVALBUTEROL INHALATION 1.25MG/3ML 1.25 MG: 1.25 SOLUTION RESPIRATORY (INHALATION) at 07:20

## 2017-07-13 RX ADMIN — PHENOBARBITAL SODIUM 30 MG: 65 INJECTION INTRAMUSCULAR; INTRAVENOUS at 21:49

## 2017-07-13 RX ADMIN — OXYCODONE HYDROCHLORIDE AND ACETAMINOPHEN 500 MG: 500 TABLET ORAL at 09:02

## 2017-07-13 RX ADMIN — Medication 10 ML: at 06:10

## 2017-07-13 RX ADMIN — HEPARIN SODIUM 5000 UNITS: 5000 INJECTION, SOLUTION INTRAVENOUS; SUBCUTANEOUS at 09:02

## 2017-07-13 RX ADMIN — VALPROATE SODIUM 625 MG: 100 INJECTION, SOLUTION INTRAVENOUS at 08:55

## 2017-07-13 RX ADMIN — MAGNESIUM SULFATE HEPTAHYDRATE 2 G: 40 INJECTION, SOLUTION INTRAVENOUS at 10:29

## 2017-07-13 RX ADMIN — LEVOFLOXACIN 750 MG: 5 INJECTION, SOLUTION INTRAVENOUS at 09:05

## 2017-07-13 RX ADMIN — HEPARIN SODIUM 5000 UNITS: 5000 INJECTION, SOLUTION INTRAVENOUS; SUBCUTANEOUS at 21:31

## 2017-07-13 RX ADMIN — ACETYLCYSTEINE: 200 SOLUTION ORAL; RESPIRATORY (INHALATION) at 20:08

## 2017-07-13 RX ADMIN — SODIUM CHLORIDE 40 MG: 9 INJECTION INTRAMUSCULAR; INTRAVENOUS; SUBCUTANEOUS at 08:58

## 2017-07-13 RX ADMIN — ACETYLCYSTEINE 400 MG: 200 SOLUTION ORAL; RESPIRATORY (INHALATION) at 07:20

## 2017-07-13 RX ADMIN — Medication 10 ML: at 14:25

## 2017-07-13 NOTE — ROUTINE PROCESS
Spoke with Dr. Bebeto Ndiaye. Ok to discontinue order for tunneled central line. No longer needed at this time.

## 2017-07-13 NOTE — PROGRESS NOTES
Daily Progress Note: 7/13/2017  Christiane Tariq MD    Assessment/Plan:   1) Sepsis/possible aspiration PNA: 2/4 SIRS on admission. High risk for aspiration. Failed outpatient Abx. Empirically on IV Zosyn, Azithro (for atypical)    Sputum culture shows Enterobacter     2) Acute encephalopathy: more lethargic. Likely due to sepsis. Monitor closely     3) Hypotension: due to sepsis. Slowly improving with IVF. Will monitor closely     4) Dysphagia/severe protein-jesus malnutrition: has severe muscle wasting. Passed swallow eval yest. Diet per speech recommendations.      5) Cerebral palsy/seizure d/o: cont depakote, phenobarb        Problem List:  Problem List as of 7/13/2017  Date Reviewed: 7/8/2017          Codes Class Noted - Resolved    * (Principal)Sepsis ([facility] ICD-10-CM: A41.9  ICD-9-CM: 038.9, 995.91  7/8/2017 - Present        Aspiration pneumonia ([facility] ICD-10-CM: J69.0  ICD-9-CM: 507.0  7/8/2017 - Present        Hypotension ICD-10-CM: I95.9  ICD-9-CM: 458.9  7/8/2017 - Present        Acute encephalopathy ICD-10-CM: G93.40  ICD-9-CM: 348.30  7/8/2017 - Present        Fever ICD-10-CM: R50.9  ICD-9-CM: 780.60  8/31/2014 - Present        SIRS (systemic inflammatory response syndrome) ([facility] ICD-10-CM: R65.10  ICD-9-CM: 995.90  8/31/2014 - Present        Cough ICD-10-CM: R05  ICD-9-CM: 786.2  8/31/2014 - Present        Sepsis(995.91) ICD-10-CM: A41.9  ICD-9-CM: 995.91  6/15/2012 - Present        UTI (urinary tract infection) ICD-10-CM: N39.0  ICD-9-CM: 599.0  6/15/2012 - Present    Overview Signed 6/17/2012  3:58 PM by Himanshu Moser     Escherichia coli    >100K col/mL             Phenobarbital toxicity ICD-10-CM: T42.3X1A  ICD-9-CM: 967.0, E980.1  6/15/2012 - Present        Cerebral palsy ([facility] ICD-10-CM: G80.9  ICD-9-CM: 343.9  6/15/2012 - Present        Scoliosis ICD-10-CM: M41.9  ICD-9-CM: 737.30  6/15/2012 - Present        Dehydration ICD-10-CM: E86.0  ICD-9-CM: 276.51 6/15/2012 - Present        Leukocytosis, unspecified ICD-10-CM: D72.829  ICD-9-CM: 288.60  6/15/2012 - Present        Encephalopathy ICD-10-CM: G93.40  ICD-9-CM: 348.30  6/15/2012 - Present              Subjective:    46 yo hx of cerebral palsy, bedbound, seizure d/o, presented w/ fever, sepsis, possible aspiration pneumonia. The patient cannot give a history. His mother, who is the POA/care taker, stated that the patient has had a worsening productive cough for several weeks, associated with dyspnea, fevers, chills. He has a hx of frequent aspiration pneumonia. The patient recently saw his PCP who prescribed cefdinir for 10 days, which helped his symptoms, but now worsened again. In the ED, temp was 100. WBC 7.7. SBP ~80s. CXR unremarkable    7/9: Moves spontaneously. Coughs episodically. On IV Zosyn and Azith. CT chest/Ab planned for today. Cont NPO until more awake. K+./Mg+ a little low - replace. 7/10:  CXR last night showed overhydration - placed on O2 facemask and diuresed. Today O2 requirements decreased. More lethargic today. K+ and Mag cont to be low - replace. 7/11:  About the same. GM here and she reports he is not as responsive as usual. I&O not recorded accurately - nursing is correcting. K+ and Mg+ still low - replace. 7/12:  K+ and mag up. Sputum culture with Enterobacter - Sensitive to Rocephin. Still not able to perform swallow eval. Has not had nutrition since he was admitted so will consult GI for TPN and ? PEG. Brother reports that he was much more alert last evening. He is in agreement for nutrition but would want to avoid PEG unless absolutely necessary. 7/13: Passed speech eval yesterday. Diet order per speech recommendations. Review of Systems:   Review of systems not obtained due to patient factors.     Objective:   Physical Exam:     Visit Vitals    /77 (BP 1 Location: Right arm, BP Patient Position: At rest)    Pulse 60    Temp 97.6 °F (36.4 °C)    Resp 10    Ht 5' 7\" (1.702 m)    Wt 87 lb 8.4 oz (39.7 kg)    SpO2 95%    BMI 13.71 kg/m2    O2 Flow Rate (L/min): 2 l/min O2 Device: Nasal cannula    Temp (24hrs), Av.9 °F (36.6 °C), Min:97.6 °F (36.4 °C), Max:98.4 °F (36.9 °C)         190 -  0700  In: 1011.3 [I.V.:1011.3]  Out: 690 [Urine:690]    General:  Chronically ill-appearing, cachectic, no acute distress   Head:  Normocephalic, without obvious abnormality, atraumatic. Eyes:  Conjunctivae/corneas clear. EOMs intact. Throat: Lips, mucosa, and tongue moist..   Neck: Supple, symmetrical, trachea midline, no adenopathy, thyroid: no enlargement/tenderness/nodules, no carotid bruit and no JVD. Lungs:   Clear to auscultation at this time. Chest wall:  No tenderness or deformity. Heart:  Regular rate and rhythm, S1, S2 normal, no murmur, click, rub or gallop. Abdomen:   Soft, non-tender. Bowel sounds normal. No masses,  No organomegaly. Extremities: Contractures unchanged. no cyanosis or edema. No apparent calf tenderness or cords. Pulses: 2+ and symmetric all extremities. Skin:  turgor normal.    Neurologic: Non-verbal.  Responds to painful stimuli. Contractures unchanged. Data Review:       Recent Days:  Recent Labs      17   0150   WBC  2.7*   HGB  10.2*   HCT  30.7*   PLT  100*     Recent Labs      17   0150   NA  132*   K  4.1   CL  97   CO2  28   GLU  68   BUN  7   CREA  0.34*   CA  8.4*   MG  1.5*       24 Hour Results:  No results found for this or any previous visit (from the past 24 hour(s)).     Medications reviewed  Current Facility-Administered Medications   Medication Dose Route Frequency    0.9% sodium chloride infusion  75 mL/hr IntraVENous CONTINUOUS    levoFLOXacin (LEVAQUIN) 750 mg in D5W IVPB  750 mg IntraVENous DAILY    PHENobarbital (LUMINAL) injection 30 mg  30 mg IntraVENous Q12H    heparin (porcine) injection 5,000 Units  5,000 Units SubCUTAneous Q12H    pantoprazole (PROTONIX) 40 mg in sodium chloride 0.9 % 10 mL injection  40 mg IntraVENous DAILY    acetylcysteine (MUCOMYST) 200 mg/mL (20 %) solution 400 mg  2 mL Nebulization BID RT    levalbuterol (XOPENEX) nebulizer soln 1.25 mg/3 mL  1.25 mg Nebulization BID RT    albuterol-ipratropium (DUO-NEB) 2.5 MG-0.5 MG/3 ML  3 mL Nebulization Q4H PRN    ascorbic acid (vitamin C) (VITAMIN C) tablet 500 mg  500 mg Oral BID    polyethylene glycol (MIRALAX) packet 17 g  17 g Oral DAILY PRN    sodium chloride (NS) flush 5-10 mL  5-10 mL IntraVENous Q8H    sodium chloride (NS) flush 5-10 mL  5-10 mL IntraVENous PRN    HYDROmorphone (PF) (DILAUDID) injection 0.2 mg  0.2 mg IntraVENous Q4H PRN    naloxone (NARCAN) injection 0.4 mg  0.4 mg IntraVENous PRN    diphenhydrAMINE (BENADRYL) injection 12.5 mg  12.5 mg IntraVENous Q4H PRN    ondansetron (ZOFRAN) injection 4 mg  4 mg IntraVENous Q6H PRN    docusate sodium (COLACE) capsule 100 mg  100 mg Oral BID    acetaminophen (TYLENOL) suppository 650 mg  650 mg Rectal Q4H PRN    diphenhydrAMINE (BENADRYL) injection 12.5 mg  12.5 mg IntraVENous QHS    valproate (DEPACON) 750 mg in 0.9% sodium chloride 50 mL IVPB  750 mg IntraVENous Q24H    valproate (DEPACON) 625 mg in 0.9% sodium chloride 50 mL IVPB  625 mg IntraVENous Q24H       Care Plan discussed with: Patient/Family and Nurse    Total time spent with patient: 30 minutes.     Suyapa Lowe MD

## 2017-07-13 NOTE — PROGRESS NOTES
0900: Angio RN with question about patient needing line. Call placed to Dr. Juliana Anderson. OK to discontinue order. Bedside shift change report given to [facility] (oncoming nurse) by Ehsan Degroot RN (offgoing nurse). Report included the following information SBAR, Kardex, Intake/Output, MAR, Recent Results and Cardiac Rhythm NSR.

## 2017-07-13 NOTE — PROGRESS NOTES
Problem: Dysphagia (Adult)  Goal: *Acute Goals and Plan of Care (Insert Text)  Swallowing goals initiated 7-12-17:  1) MBS to DETErmine the severity of dysphagia and help with goals of care by 7-15-17 -met  2) tolerate dysphagia 1, honey thick liquids without s/s of aspiration by 7-17-17  SPEECH LANGUAGE PATHOLOGY DYSPHAGIA TREATMENT  Patient: John Neil (85 y.o. male)  Date: 7/13/2017  Diagnosis: Sepsis ([facility] Sepsis ([facility]       Precautions: aspiration Fall, WBAT      ASSESSMENT:  Patient started dysphagia 1, honey thick liquids yesterday afternoon. Coughing at lunch. We will try PO for 24 hours and then re-evaluate his success. Family really wants patinet to eat the entire tray, but this may push too much in stomach and lead to regurgitation. Progression toward goals:  [ ]         Improving appropriately and progressing toward goals  [X]         Improving slowly and progressing toward goals  [ ]         Not making progress toward goals and plan of care will be adjusted       PLAN:  Recommendations and Planned Interventions:  Continue dysphagia 1, honey thick liquids. Patient continues to benefit from skilled intervention to address the above impairments. Continue treatment per established plan of care. Discharge Recommendations:  Home Health       SUBJECTIVE:   Patient mom stated:  these liquids are thicker than what he drinks at home and he can tell the difference\"  Reviewed MBS results with mom. OBJECTIVE:   Cognitive and Communication Status:  Neurologic State: Alert  Orientation Level: Unable to verbalize  Cognition: Decreased attention/concentration, Decreased command following  Perception:  (reduced eye contact)  Perseveration: No perseveration noted  Safety/Judgement: Not assessed  Dysphagia Treatment:  Oral Assessment:     P.O. Trials:  Patient Position: upright in chair  Vocal quality prior to P.O.: Aphonic  Consistency Presented: Puree; Honey thick liquid      ORAL PHASE:   Mildly slow response to spoon. Mom feeding lunch. Brother fed bfast  PHARYNGEAL PHASE:   Mild-moderate weakness. intermittant coughing on purees and honey thick liquids at lunch, but RN says NOT at bfast.   Cough is weak, almost productive to oral cavity, but suspect it re-entered pharynx. Mom says wet cough is new. Exercises:  Laryngeal Exercises:                                                                                                                                   Pain:  Pain Scale 1: Visual  Pain Intensity 1: 0     After treatment:   [ ]              Patient left in no apparent distress sitting up in chair  [X]              Patient left in no apparent distress in bed  [ ]              Call bell left within reach  [X]              Nursing notified  [ ]              Caregiver present  [ ]              Bed alarm activated      COMMUNICATION/EDUCATION:   Patient was educated regarding His deficit(s) of dysphagia  as this relates to His diagnosis of aspiration PNA. His mom demonstrated Fair understanding as evidenced by discussion. .     The patients plan of care including recommendations, planned interventions, and recommended diet changes were discussed with: Registered Nurse. [ ]              Posted safety precautions in patient's room.      Chinyere Irving SLP  Time Calculation: 15 mins

## 2017-07-13 NOTE — PROGRESS NOTES
[facility] PROGRESS NOTE  Pulmonary, Critical Care, and Sleep Medicine    Name: Isidoro Good MRN: [id number]   : 1965 [facility]: [facility]   Date: 2017  Admission Date: 2017     Chart and notes reviewed. Data reviewed. I review the patient's current medications in the medical record at each encounter. I have evaluated and examined the patient. Overnight events reviewed:  Afebrile  Sats 95-98% on RA  BP stable  No new am labs  Resp cx with enterobacter  MBS with nectar and pureed: there was some delay in the oral phase and some residue but there was no aspiration    ROS: Unable to obtain due to nonverbal status and CP. No family at bedside currently. He is awake and looking around the room. Vital Signs:  Visit Vitals    /77 (BP 1 Location: Right arm, BP Patient Position: At rest)    Pulse 60    Temp 97.6 °F (36.4 °C)    Resp 10    Ht 5' 7\" (1.702 m)    Wt 39.7 kg (87 lb 8.4 oz)    SpO2 96%    BMI 13.71 kg/m2       O2 Device: Nasal cannula   O2 Flow Rate (L/min): 2 l/min   Temp (24hrs), Av.9 °F (36.6 °C), Min:97.6 °F (36.4 °C), Max:98.4 °F (36.9 °C)       Intake/Output:   Last shift:         Last 3 shifts:  1901 -  0700  In: 1011.3 [I.V.:1011.3]  Out: 690 [Urine:690]    Intake/Output Summary (Last 24 hours) at 17 0837  Last data filed at 17 9876   Gross per 24 hour   Intake           911.25 ml   Output              340 ml   Net           571.25 ml          Physical Exam:   General:  Awake, no acute distress, appears stated age. Head:  Normocephalic, without obvious abnormality, atraumatic. Eyes:  Conjunctivae/corneas clear. Nose: Nares normal. Septum midline. Mucosa normal.   Throat: Lips, mucosa, and tongue normal.    Neck: Supple, symmetrical, trachea midline, no adenopathy. Lungs:   Clear to auscultation bilaterally. Protecting airway. Chest wall:  No tenderness or deformity.    Heart:  Regular rate and rhythm, S1, S2 normal, no murmur, click, rub or gallop. Abdomen:   Soft, non-tender. Bowel sounds normal. No masses,  No organomegaly. Extremities: Extremities contracted atraumatic, no cyanosis or edema. Contracted. Pulses: 2+ and symmetric all extremities. Skin: Skin color, texture, turgor normal. No rashes or lesions   Lymph nodes: Cervical, supraclavicular nodes normal.   Neurologic: Unable to evaluate     DATA:  MAR reviewed and pertinent medications noted or modified as needed    Labs:  Recent Labs      07/12/17   0150   WBC  2.7*   HGB  10.2*   HCT  30.7*   PLT  100*     Recent Labs      07/12/17   0150   NA  132*   K  4.1   CL  97   CO2  28   GLU  68   BUN  7   CREA  0.34*   CA  8.4*   MG  1.5*       Imaging:  No new images this morning; 7/10 images with clear lungs      IMPRESSION:   1. Acute hypoxic respiratory failure: resolved and on RA  2. Bronchitis vs. PNA; sputum with enterobacter  3. Leukopenia: unclear etiology  4. Cerebral palsy  5. Hx of aspiration PNA  6. Seizure disorder      PLAN:   · O2 if needed to keep sats > 90%  · Continue gentle NS IVF until taking adequate NPO; watch for potential volume overlaoad  · Check am CBC, BMP, and Mag - replete electrolytes as needed  · Continue Levaquin for 7 days total and f/u blood cx.   · Dysphagia diet with honey think liquids per Speech; may need PEG if continous to have aspiration events despite modified consistencies  · Continue Duonebs and Mucocyst/Xopenex nebs  · Valproate per primary team/pharmacy  · DVT prophylaxis: sub q heparin  · GI prophylaxis: Protonix         Jade Cherry

## 2017-07-13 NOTE — PROGRESS NOTES
EMR reviewed, plan is for pt to return home to live with family. I will continue to follow and assist with dc planning.  Thanks TONY Smith

## 2017-07-13 NOTE — PROGRESS NOTES
Physical [employer]    Orders acknowledged, chart reviewed, and spoke with nursing. Patient received in bed with parents at bedside; pt's mom reports she is the primary caregiver. She also reports patient's brother provides assistance/care at home. Patient himself is unable to follow commands or engage in activity for full assessment, as well as mostly non-verbal.  Extensive discussion held with pt's mom regarding pt's prior level of function, equipment, and home situation. She confirms he is completely dependent for ADLs and has been for quite some time. Pt's mom willing to assist in transfer to recliner from bed. PT assisted with line management and pt's mom performed full transfer. Total A required to move pt from EOB to chair as well as constant support sitting up at EOB to maintain sitting balance. Proped pt up with pillows in recliner and encouraged pt's mom to notify nursing before transferring back to bed. Patient's mom does not identify any equipment needs and confirms he will continue to have assistance for ADLs and transfers upon return home. Will complete order as there are no further PT needs.       Thank You,  Ricardo Councilman, PT  Time Spent 28 minutes

## 2017-07-13 NOTE — PROGRESS NOTES
2050 Pt turned on right side by RN. Family returned in room. Pt on NC 2 L. Seizure precaution    0874 Pt went to sleep. Father sleeping also. 80 Pt family member asked that pt not be repositioned. Pt likes to be left in one position all night long during night.    0207 Pt sleeping.     0425 RN in room to check on pt. Family member said leave pt in same position and he is going to call when pt needs assistance. 7153 Pt off nasal canula, oxygen saturation 96% over night. Pt was asymptomatic for seizure. 8596 Incontinent care was done. Family member asked pt sheets to be changed later. Bedside and Verbal shift change report given to Avelino Jenkins RN (oncoming nurse) by Bianca Renteria RN (offgoing nurse). Report included the following information SBAR, Kardex, MAR and Cardiac Rhythm sinus rhythm.

## 2017-07-13 NOTE — PROGRESS NOTES
Bedside and Verbal shift change report given to Candy RN (oncoming nurse) by Wilfredo Toro RN (offgoing nurse). Report included the following information SBAR, Kardex, Intake/Output, MAR, Recent Results and Cardiac Rhythm NSR.

## 2017-07-14 LAB
ALBUMIN SERPL BCP-MCNC: 2.1 G/DL (ref 3.5–5)
ALBUMIN/GLOB SERPL: 0.6 {RATIO} (ref 1.1–2.2)
ALP SERPL-CCNC: 96 U/L (ref 45–117)
ALT SERPL-CCNC: 14 U/L (ref 12–78)
ANION GAP BLD CALC-SCNC: 8 MMOL/L (ref 5–15)
AST SERPL W P-5'-P-CCNC: 18 U/L (ref 15–37)
BACTERIA SPEC CULT: NORMAL
BACTERIA SPEC CULT: NORMAL
BASOPHILS # BLD AUTO: 0 K/UL (ref 0–0.1)
BASOPHILS # BLD: 0 % (ref 0–1)
BILIRUB SERPL-MCNC: 0.1 MG/DL (ref 0.2–1)
BUN SERPL-MCNC: 4 MG/DL (ref 6–20)
BUN/CREAT SERPL: 13 (ref 12–20)
CALCIUM SERPL-MCNC: 8.3 MG/DL (ref 8.5–10.1)
CHLORIDE SERPL-SCNC: 110 MMOL/L (ref 97–108)
CO2 SERPL-SCNC: 25 MMOL/L (ref 21–32)
CREAT SERPL-MCNC: 0.31 MG/DL (ref 0.7–1.3)
DIFFERENTIAL METHOD BLD: ABNORMAL
EOSINOPHIL # BLD: 0 K/UL (ref 0–0.4)
EOSINOPHIL NFR BLD: 0 % (ref 0–7)
ERYTHROCYTE [DISTWIDTH] IN BLOOD BY AUTOMATED COUNT: 11.9 % (ref 11.5–14.5)
GLOBULIN SER CALC-MCNC: 3.7 G/DL (ref 2–4)
GLUCOSE SERPL-MCNC: 91 MG/DL (ref 65–100)
HCT VFR BLD AUTO: 30.4 % (ref 36.6–50.3)
HGB BLD-MCNC: 10.3 G/DL (ref 12.1–17)
LYMPHOCYTES # BLD AUTO: 11 % (ref 12–49)
LYMPHOCYTES # BLD: 0.3 K/UL (ref 0.8–3.5)
MCH RBC QN AUTO: 31.8 PG (ref 26–34)
MCHC RBC AUTO-ENTMCNC: 33.9 G/DL (ref 30–36.5)
MCV RBC AUTO: 93.8 FL (ref 80–99)
MONOCYTES # BLD: 0.3 K/UL (ref 0–1)
MONOCYTES NFR BLD AUTO: 10 % (ref 5–13)
MYELOCYTES NFR BLD MANUAL: 1 %
NEUTS BAND NFR BLD MANUAL: 1 % (ref 0–6)
NEUTS SEG # BLD: 2.2 K/UL (ref 1.8–8)
NEUTS SEG NFR BLD AUTO: 77 % (ref 32–75)
NRBC BLD-RTO: 1 PER 100 WBC
PLATELET # BLD AUTO: 153 K/UL (ref 150–400)
POTASSIUM SERPL-SCNC: 3.6 MMOL/L (ref 3.5–5.1)
PROT SERPL-MCNC: 5.8 G/DL (ref 6.4–8.2)
RBC # BLD AUTO: 3.24 M/UL (ref 4.1–5.7)
RBC MORPH BLD: ABNORMAL
SERVICE CMNT-IMP: NORMAL
SERVICE CMNT-IMP: NORMAL
SODIUM SERPL-SCNC: 143 MMOL/L (ref 136–145)
WBC # BLD AUTO: 2.8 K/UL (ref 4.1–11.1)

## 2017-07-14 PROCEDURE — 65660000000 HC RM CCU STEPDOWN

## 2017-07-14 PROCEDURE — C9113 INJ PANTOPRAZOLE SODIUM, VIA: HCPCS | Performed by: INTERNAL MEDICINE

## 2017-07-14 PROCEDURE — 36415 COLL VENOUS BLD VENIPUNCTURE: CPT | Performed by: FAMILY MEDICINE

## 2017-07-14 PROCEDURE — 77010033678 HC OXYGEN DAILY

## 2017-07-14 PROCEDURE — 80053 COMPREHEN METABOLIC PANEL: CPT | Performed by: FAMILY MEDICINE

## 2017-07-14 PROCEDURE — 74011000258 HC RX REV CODE- 258: Performed by: FAMILY MEDICINE

## 2017-07-14 PROCEDURE — 74011250636 HC RX REV CODE- 250/636: Performed by: PHYSICIAN ASSISTANT

## 2017-07-14 PROCEDURE — 74011250637 HC RX REV CODE- 250/637: Performed by: FAMILY MEDICINE

## 2017-07-14 PROCEDURE — 85025 COMPLETE CBC W/AUTO DIFF WBC: CPT | Performed by: FAMILY MEDICINE

## 2017-07-14 PROCEDURE — 74011250636 HC RX REV CODE- 250/636: Performed by: FAMILY MEDICINE

## 2017-07-14 PROCEDURE — 74011250636 HC RX REV CODE- 250/636: Performed by: INTERNAL MEDICINE

## 2017-07-14 PROCEDURE — 94640 AIRWAY INHALATION TREATMENT: CPT

## 2017-07-14 PROCEDURE — 74011000250 HC RX REV CODE- 250: Performed by: FAMILY MEDICINE

## 2017-07-14 PROCEDURE — 74011000250 HC RX REV CODE- 250: Performed by: INTERNAL MEDICINE

## 2017-07-14 RX ORDER — LOPERAMIDE HYDROCHLORIDE 2 MG/1
2 CAPSULE ORAL AS NEEDED
Status: DISCONTINUED | OUTPATIENT
Start: 2017-07-14 | End: 2017-07-16 | Stop reason: HOSPADM

## 2017-07-14 RX ORDER — LOPERAMIDE HYDROCHLORIDE 2 MG/1
4 CAPSULE ORAL ONCE
Status: COMPLETED | OUTPATIENT
Start: 2017-07-14 | End: 2017-07-14

## 2017-07-14 RX ADMIN — ACETYLCYSTEINE 400 MG: 200 SOLUTION ORAL; RESPIRATORY (INHALATION) at 07:22

## 2017-07-14 RX ADMIN — PHENOBARBITAL SODIUM 30 MG: 65 INJECTION INTRAMUSCULAR; INTRAVENOUS at 21:04

## 2017-07-14 RX ADMIN — HEPARIN SODIUM 5000 UNITS: 5000 INJECTION, SOLUTION INTRAVENOUS; SUBCUTANEOUS at 09:38

## 2017-07-14 RX ADMIN — LOPERAMIDE HYDROCHLORIDE 4 MG: 2 CAPSULE ORAL at 20:47

## 2017-07-14 RX ADMIN — Medication 10 ML: at 05:11

## 2017-07-14 RX ADMIN — LEVOFLOXACIN 750 MG: 5 INJECTION, SOLUTION INTRAVENOUS at 09:37

## 2017-07-14 RX ADMIN — SODIUM CHLORIDE 75 ML/HR: 900 INJECTION, SOLUTION INTRAVENOUS at 07:44

## 2017-07-14 RX ADMIN — DIPHENHYDRAMINE HYDROCHLORIDE 12.5 MG: 50 INJECTION, SOLUTION INTRAMUSCULAR; INTRAVENOUS at 21:04

## 2017-07-14 RX ADMIN — Medication 10 ML: at 21:03

## 2017-07-14 RX ADMIN — LEVALBUTEROL INHALATION 1.25MG/3ML 1.25 MG: 1.25 SOLUTION RESPIRATORY (INHALATION) at 07:20

## 2017-07-14 RX ADMIN — VALPROATE SODIUM 625 MG: 100 INJECTION, SOLUTION INTRAVENOUS at 11:09

## 2017-07-14 RX ADMIN — SODIUM CHLORIDE 40 MG: 9 INJECTION INTRAMUSCULAR; INTRAVENOUS; SUBCUTANEOUS at 09:37

## 2017-07-14 RX ADMIN — LEVALBUTEROL INHALATION 1.25MG/3ML 1.25 MG: 1.25 SOLUTION RESPIRATORY (INHALATION) at 20:03

## 2017-07-14 RX ADMIN — PHENOBARBITAL SODIUM 30 MG: 65 INJECTION INTRAMUSCULAR; INTRAVENOUS at 09:38

## 2017-07-14 RX ADMIN — ACETYLCYSTEINE: 200 SOLUTION ORAL; RESPIRATORY (INHALATION) at 20:03

## 2017-07-14 RX ADMIN — HEPARIN SODIUM 5000 UNITS: 5000 INJECTION, SOLUTION INTRAVENOUS; SUBCUTANEOUS at 21:04

## 2017-07-14 RX ADMIN — VALPROATE SODIUM 750 MG: 100 INJECTION, SOLUTION INTRAVENOUS at 21:24

## 2017-07-14 NOTE — PROGRESS NOTES
[facility] PROGRESS NOTE  Pulmonary, Critical Care, and Sleep Medicine    Name: Juan Mcmillan MRN: [id number]   : 1965 Hospital: [address] [facility]   Date: 2017  Admission Date: 2017     Chart and notes reviewed. Data reviewed. I review the patient's current medications in the medical record at each encounter. I have evaluated and examined the patient. Overnight events reviewed:  Afebrile  Sats 96% on RA  BP stable  WBC 2.8 - improved  Resp cx with enterobacter  MBS with nectar and pureed: there was some delay in the oral phase and some residue but there was no aspiration    ROS: Unable to obtain due to nonverbal status and CP. Mom at bedside and says he seems to do well with diet so far. Has occasional cough. He is awake and looking around the room. Vital Signs:  Visit Vitals    /69 (BP 1 Location: Right arm, BP Patient Position: At rest)    Pulse 85    Temp 97.9 °F (36.6 °C)    Resp 13    Ht 5' 7\" (1.702 m)    Wt 39.7 kg (87 lb 8.4 oz)    SpO2 96%    BMI 13.71 kg/m2       O2 Device: Room air   O2 Flow Rate (L/min): 2 l/min   Temp (24hrs), Av.2 °F (36.8 °C), Min:97.5 °F (36.4 °C), Max:98.8 °F (37.1 °C)       Intake/Output:   Last shift:         Last 3 shifts:  1901 -  0700  In: 1746.3 [P.O.:240; I.V.:1506.3]  Out: 1490 [Urine:1490]    Intake/Output Summary (Last 24 hours) at 17 1131  Last data filed at 17 1900   Gross per 24 hour   Intake            557.5 ml   Output             1000 ml   Net           -442.5 ml          Physical Exam:   General:  Awake, no acute distress, appears stated age. Head:  Normocephalic, without obvious abnormality, atraumatic. Eyes:  Conjunctivae/corneas clear. Nose: Nares normal. Septum midline. Mucosa normal.   Throat: Lips, mucosa, and tongue normal.    Neck: Supple, symmetrical, trachea midline, no adenopathy. Lungs:   Scattered rhonchi, good cough.   No rales, wheeze, or increased WOB.   Chest wall:  No tenderness or deformity. Heart:  Regular rate and rhythm, S1, S2 normal, no murmur, click, rub or gallop. Abdomen:   Soft, non-tender. Bowel sounds normal. No masses,  No organomegaly. Extremities: Extremities contracted atraumatic, no cyanosis or edema. Contracted. Pulses: 2+ and symmetric all extremities. Skin: Skin color, texture, turgor normal. No rashes or lesions   Lymph nodes: Cervical, supraclavicular nodes normal.   Neurologic: Unable to evaluate     DATA:  MAR reviewed and pertinent medications noted or modified as needed    Labs:  Recent Labs      07/14/17   0531  07/13/17   0904  07/12/17   0150   WBC  2.8*  2.4*  2.7*   HGB  10.3*  10.8*  10.2*   HCT  30.4*  32.8*  30.7*   PLT  153  115*  100*     Recent Labs      07/14/17 0531 07/13/17   0904  07/12/17   0150   NA  143  140  132*   K  3.6  3.7  4.1   CL  110*  103  97   CO2  25  27  28   GLU  91  71  68   BUN  4*  9  7   CREA  0.31*  0.34*  0.34*   CA  8.3*  8.3*  8.4*   MG   --   1.5*  1.5*   ALB  2.1*   --    --    TBILI  0.1*   --    --    SGOT  18   --    --    ALT  14   --    --        Imaging:  No new images; 7/10 images with clear lungs      IMPRESSION:   1. Acute hypoxic respiratory failure: resolved and on RA  2. Bronchitis vs. PNA; sputum with enterobacter  3. Leukopenia: unclear etiology; improving  4. Cerebral palsy  5. Hx of aspiration PNA  6. Seizure disorder      PLAN:   · O2 if needed to keep sats > 90%  · D/C IVF since now taking adequate po   · Continue Levaquin for 7 days total; stop date 7/17. · Dysphagia diet with honey think liquids per Speech; may need PEG if continues to have aspiration events despite modified consistencies  · Continue Duonebs and Mucocyst/Xopenex nebs  · Valproate per primary team/pharmacy  · DVT prophylaxis: sub q heparin  · GI prophylaxis: Protonix    Patient is stable from a pulmonary standpoint. We will sign off and be available as needed. Please call with questions. Juanita Lolo, [place]

## 2017-07-14 NOTE — PROGRESS NOTES
1959 Mom in room. Pt looks content. Pt incontinent care give. 2145 Pt incontinent care given, turned by RN and turn member. 9474 Turn team was called 3 times to come turn pt, last time they said they need to clean equipments first.    Bedside and Verbal shift change report given to [address] [place] [address] (oncoming nurse) by Brody Huerta RN (offgoing nurse). Report included the following information SBAR, Kardex, MAR and Cardiac Rhythm sinus rhythm.

## 2017-07-14 NOTE — PROGRESS NOTES
Daily Progress Note: 7/14/2017  Gerri Beal MD    Assessment/Plan:   1) Sepsis/possible aspiration PNA: 2/4 SIRS on admission. High risk for aspiration. Failed outpatient Abx. Empirically on IV Zosyn, Azithro (for atypical) and changed to Levaquin IV    Sputum culture shows Enterobacter     2) Acute encephalopathy:  Likely due to sepsis. Monitor closely     3) Hypotension: due to sepsis. Slowly improving with IVF. Will monitor closely     4) Dysphagia/severe protein-jesus malnutrition: has severe muscle wasting. Passed swallow eval yest. Diet per speech recommendations.      5) Cerebral palsy/seizure d/o: cont depakote, phenobarb        Problem List:  Problem List as of 7/14/2017  Date Reviewed: 7/8/2017          Codes Class Noted - Resolved    * (Principal)Sepsis ([facility] ICD-10-CM: A41.9  ICD-9-CM: 038.9, 995.91  7/8/2017 - Present        Aspiration pneumonia ([facility] ICD-10-CM: J69.0  ICD-9-CM: 507.0  7/8/2017 - Present        Hypotension ICD-10-CM: I95.9  ICD-9-CM: 458.9  7/8/2017 - Present        Acute encephalopathy ICD-10-CM: G93.40  ICD-9-CM: 348.30  7/8/2017 - Present        Fever ICD-10-CM: R50.9  ICD-9-CM: 780.60  8/31/2014 - Present        SIRS (systemic inflammatory response syndrome) ([facility] ICD-10-CM: R65.10  ICD-9-CM: 995.90  8/31/2014 - Present        Cough ICD-10-CM: R05  ICD-9-CM: 786.2  8/31/2014 - Present        Sepsis(995.91) ICD-10-CM: A41.9  ICD-9-CM: 995.91  6/15/2012 - Present        UTI (urinary tract infection) ICD-10-CM: N39.0  ICD-9-CM: 599.0  6/15/2012 - Present    Overview Signed 6/17/2012  3:58 PM by Asif Vaca     Escherichia coli    >100K col/mL             Phenobarbital toxicity ICD-10-CM: T42.3X1A  ICD-9-CM: 967.0, E980.1  6/15/2012 - Present        Cerebral palsy ([facility] ICD-10-CM: G80.9  ICD-9-CM: 343.9  6/15/2012 - Present        Scoliosis ICD-10-CM: M41.9  ICD-9-CM: 737.30  6/15/2012 - Present        Dehydration ICD-10-CM: E86.0  ICD-9-CM: 276.51  6/15/2012 - Present        Leukocytosis, unspecified ICD-10-CM: D72.829  ICD-9-CM: 288.60  6/15/2012 - Present        Encephalopathy ICD-10-CM: G93.40  ICD-9-CM: 348.30  6/15/2012 - Present              Subjective:    46 yo hx of cerebral palsy, bedbound, seizure d/o, presented w/ fever, sepsis, possible aspiration pneumonia. The patient cannot give a history. His mother, who is the POA/care taker, stated that the patient has had a worsening productive cough for several weeks, associated with dyspnea, fevers, chills. He has a hx of frequent aspiration pneumonia. The patient recently saw his PCP who prescribed cefdinir for 10 days, which helped his symptoms, but now worsened again. In the ED, temp was 100. WBC 7.7. SBP ~80s. CXR unremarkable    7/9: Moves spontaneously. Coughs episodically. On IV Zosyn and Azith. CT chest/Ab planned for today. Cont NPO until more awake. K+./Mg+ a little low - replace. 7/10:  CXR last night showed overhydration - placed on O2 facemask and diuresed. Today O2 requirements decreased. More lethargic today. K+ and Mag cont to be low - replace. 7/11:  About the same. GM here and she reports he is not as responsive as usual. I&O not recorded accurately - nursing is correcting. K+ and Mg+ still low - replace. 7/12:  K+ and mag up. Sputum culture with Enterobacter - Sensitive to Rocephin. Still not able to perform swallow eval. Has not had nutrition since he was admitted so will consult GI for TPN and ? PEG. Brother reports that he was much more alert last evening. He is in agreement for nutrition but would want to avoid PEG unless absolutely necessary. 7/13: Passed speech eval yesterday. Diet order per speech recommendations. 7/14: Tolerating pureed diet so far. More alert. Plan is trial of diet for a day or two, and if he does not aspirate then discharge back to home.      Review of Systems:   Review of systems not obtained due to patient factors. Objective:   Physical Exam:     Visit Vitals    /69 (BP 1 Location: Right arm, BP Patient Position: At rest)    Pulse 85    Temp 97.9 °F (36.6 °C)    Resp 13    Ht 5' 7\" (1.702 m)    Wt 39.7 kg (87 lb 8.4 oz)    SpO2 96%    BMI 13.71 kg/m2    O2 Flow Rate (L/min): 2 l/min O2 Device: Room air    Temp (24hrs), Av.7 °F (36.5 °C), Min:96 °F (35.6 °C), Max:98.8 °F (37.1 °C)         190 -  0700  In: 1746.3 [P.O.:240; I.V.:1506.3]  Out: 1490 [Urine:1490]    General:  Chronically ill-appearing, cachectic, no acute distress   Head:  Normocephalic, without obvious abnormality, atraumatic. Eyes:  Conjunctivae/corneas clear. EOMs intact. Throat: Lips, mucosa, and tongue moist..   Neck: Supple, symmetrical, trachea midline, no adenopathy, thyroid: no enlargement/tenderness/nodules, no carotid bruit and no JVD. Lungs:   Clear to auscultation at this time. Chest wall:  No tenderness or deformity. Heart:  Regular rate and rhythm, S1, S2 normal, no murmur, click, rub or gallop. Abdomen:   Soft, non-tender. Bowel sounds normal. No masses,  No organomegaly. Extremities: Contractures unchanged. no cyanosis or edema. No apparent calf tenderness or cords. Pulses: 2+ and symmetric all extremities. Skin:  turgor normal.    Neurologic: Non-verbal.  Responds to painful stimuli. Contractures unchanged.         Data Review:       Recent Days:  Recent Labs      17   0531  17   0904  17   0150   WBC  2.8*  2.4*  2.7*   HGB  10.3*  10.8*  10.2*   HCT  30.4*  32.8*  30.7*   PLT  153  115*  100*     Recent Labs      17   0531  17   0904  17   0150   NA  143  140  132*   K  3.6  3.7  4.1   CL  110*  103  97   CO2  25  27  28   GLU  91  71  68   BUN  4*  9  7   CREA  0.31*  0.34*  0.34*   CA  8.3*  8.3*  8.4*   MG   --   1.5*  1.5*   ALB  2.1*   --    --    TBILI  0.1*   --    --    SGOT  18   --    --    ALT  14   --    --        24 Hour Results:  Recent Results (from the past 24 hour(s))   CBC WITH AUTOMATED DIFF    Collection Time: 07/13/17  9:04 AM   Result Value Ref Range    WBC 2.4 (L) 4.1 - 11.1 K/uL    RBC 3.40 (L) 4.10 - 5.70 M/uL    HGB 10.8 (L) 12.1 - 17.0 g/dL    HCT 32.8 (L) 36.6 - 50.3 %    MCV 96.5 80.0 - 99.0 FL    MCH 31.8 26.0 - 34.0 PG    MCHC 32.9 30.0 - 36.5 g/dL    RDW 12.1 11.5 - 14.5 %    PLATELET 046 (L) 216 - 400 K/uL    NEUTROPHILS 74 32 - 75 %    LYMPHOCYTES 13 12 - 49 %    MONOCYTES 13 5 - 13 %    EOSINOPHILS 0 0 - 7 %    BASOPHILS 0 0 - 1 %    ABS. NEUTROPHILS 1.8 1.8 - 8.0 K/UL    ABS. LYMPHOCYTES 0.3 (L) 0.8 - 3.5 K/UL    ABS. MONOCYTES 0.3 0.0 - 1.0 K/UL    ABS. EOSINOPHILS 0.0 0.0 - 0.4 K/UL    ABS.  BASOPHILS 0.0 0.0 - 0.1 K/UL    DF MANUAL      RBC COMMENTS NORMOCYTIC, NORMOCHROMIC     METABOLIC PANEL, BASIC    Collection Time: 07/13/17  9:04 AM   Result Value Ref Range    Sodium 140 136 - 145 mmol/L    Potassium 3.7 3.5 - 5.1 mmol/L    Chloride 103 97 - 108 mmol/L    CO2 27 21 - 32 mmol/L    Anion gap 10 5 - 15 mmol/L    Glucose 71 65 - 100 mg/dL    BUN 9 6 - 20 MG/DL    Creatinine 0.34 (L) 0.70 - 1.30 MG/DL    BUN/Creatinine ratio 26 (H) 12 - 20      GFR est AA >60 >60 ml/min/1.73m2    GFR est non-AA >60 >60 ml/min/1.73m2    Calcium 8.3 (L) 8.5 - 10.1 MG/DL   MAGNESIUM    Collection Time: 07/13/17  9:04 AM   Result Value Ref Range    Magnesium 1.5 (L) 1.6 - 2.4 mg/dL   CBC WITH AUTOMATED DIFF    Collection Time: 07/14/17  5:31 AM   Result Value Ref Range    WBC 2.8 (L) 4.1 - 11.1 K/uL    RBC 3.24 (L) 4.10 - 5.70 M/uL    HGB 10.3 (L) 12.1 - 17.0 g/dL    HCT 30.4 (L) 36.6 - 50.3 %    MCV 93.8 80.0 - 99.0 FL    MCH 31.8 26.0 - 34.0 PG    MCHC 33.9 30.0 - 36.5 g/dL    RDW 11.9 11.5 - 14.5 %    PLATELET 165 735 - 790 K/uL    NEUTROPHILS 77 (H) 32 - 75 %    BAND NEUTROPHILS 1 0 - 6 %    LYMPHOCYTES 11 (L) 12 - 49 %    MONOCYTES 10 5 - 13 %    EOSINOPHILS 0 0 - 7 %    BASOPHILS 0 0 - 1 %    MYELOCYTES 1 (H) 0 %    NRBC 1.0 (H) 0  WBC    ABS. NEUTROPHILS 2.2 1.8 - 8.0 K/UL    ABS. LYMPHOCYTES 0.3 (L) 0.8 - 3.5 K/UL    ABS. MONOCYTES 0.3 0.0 - 1.0 K/UL    ABS. EOSINOPHILS 0.0 0.0 - 0.4 K/UL    ABS. BASOPHILS 0.0 0.0 - 0.1 K/UL    DF MANUAL      RBC COMMENTS NORMOCYTIC, NORMOCHROMIC     METABOLIC PANEL, COMPREHENSIVE    Collection Time: 07/14/17  5:31 AM   Result Value Ref Range    Sodium 143 136 - 145 mmol/L    Potassium 3.6 3.5 - 5.1 mmol/L    Chloride 110 (H) 97 - 108 mmol/L    CO2 25 21 - 32 mmol/L    Anion gap 8 5 - 15 mmol/L    Glucose 91 65 - 100 mg/dL    BUN 4 (L) 6 - 20 MG/DL    Creatinine 0.31 (L) 0.70 - 1.30 MG/DL    BUN/Creatinine ratio 13 12 - 20      GFR est AA >60 >60 ml/min/1.73m2    GFR est non-AA >60 >60 ml/min/1.73m2    Calcium 8.3 (L) 8.5 - 10.1 MG/DL    Bilirubin, total 0.1 (L) 0.2 - 1.0 MG/DL    ALT (SGPT) 14 12 - 78 U/L    AST (SGOT) 18 15 - 37 U/L    Alk.  phosphatase 96 45 - 117 U/L    Protein, total 5.8 (L) 6.4 - 8.2 g/dL    Albumin 2.1 (L) 3.5 - 5.0 g/dL    Globulin 3.7 2.0 - 4.0 g/dL    A-G Ratio 0.6 (L) 1.1 - 2.2         Medications reviewed  Current Facility-Administered Medications   Medication Dose Route Frequency    0.9% sodium chloride infusion  75 mL/hr IntraVENous CONTINUOUS    levoFLOXacin (LEVAQUIN) 750 mg in D5W IVPB  750 mg IntraVENous DAILY    PHENobarbital (LUMINAL) injection 30 mg  30 mg IntraVENous Q12H    heparin (porcine) injection 5,000 Units  5,000 Units SubCUTAneous Q12H    pantoprazole (PROTONIX) 40 mg in sodium chloride 0.9 % 10 mL injection  40 mg IntraVENous DAILY    acetylcysteine (MUCOMYST) 200 mg/mL (20 %) solution 400 mg  2 mL Nebulization BID RT    levalbuterol (XOPENEX) nebulizer soln 1.25 mg/3 mL  1.25 mg Nebulization BID RT    albuterol-ipratropium (DUO-NEB) 2.5 MG-0.5 MG/3 ML  3 mL Nebulization Q4H PRN    ascorbic acid (vitamin C) (VITAMIN C) tablet 500 mg  500 mg Oral BID    polyethylene glycol (MIRALAX) packet 17 g  17 g Oral DAILY PRN    sodium chloride (NS) flush 5-10 mL  5-10 mL IntraVENous Q8H    sodium chloride (NS) flush 5-10 mL  5-10 mL IntraVENous PRN    HYDROmorphone (PF) (DILAUDID) injection 0.2 mg  0.2 mg IntraVENous Q4H PRN    naloxone (NARCAN) injection 0.4 mg  0.4 mg IntraVENous PRN    diphenhydrAMINE (BENADRYL) injection 12.5 mg  12.5 mg IntraVENous Q4H PRN    ondansetron (ZOFRAN) injection 4 mg  4 mg IntraVENous Q6H PRN    docusate sodium (COLACE) capsule 100 mg  100 mg Oral BID    acetaminophen (TYLENOL) suppository 650 mg  650 mg Rectal Q4H PRN    diphenhydrAMINE (BENADRYL) injection 12.5 mg  12.5 mg IntraVENous QHS    valproate (DEPACON) 750 mg in 0.9% sodium chloride 50 mL IVPB  750 mg IntraVENous Q24H    valproate (DEPACON) 625 mg in 0.9% sodium chloride 50 mL IVPB  625 mg IntraVENous Q24H       Care Plan discussed with: Patient/Family and Nurse    Total time spent with patient: 30 minutes.     Casey Amaya MD

## 2017-07-14 NOTE — PROGRESS NOTES
Nutrition Assessment:    RECOMMENDATIONS/INTERVENTION(S):      1. Diet consistency per SLP  2. Feed pt and encourage Ensure Pudding and Magic Cup for additional protein/kcals to maintain skin integrity    Will continue to monitor plan of care and provide further recs/interventions prn    ASSESSMENT:   7/14: Pt and mother seen for f/u. Mother states pt ate very well at breakfast: eggs, oatmeal, applesauce, peaches, orange juice. Patient needs to be fed. Mother is very supportive and has other family members that visit and help as well, 2 sons and a sister. She is agreeable to trying Magic Cup and Ensure Pudding for additional nutrition with patient's  low weight and bed bound- risk for skin breakdown. 7/10:Noted consult for general nutrition & supplement management. Chart reviewed. 46 yo male admitted with sepsis, acute respiratory failure. Hx CP, aspiration pneumonia, seizures. Spoke with mother. Reports pt home diet consists of pureed foods with HTL, adds protein powder and liquid Ensure to items such as oatmeal. Appetite is usually good. Feels weight has been \"pretty stable. \"  Pt currently NPO, not ready for swallowing evaluation at this time per SLP. Malik repleted. Skin--rt ear red, abrasion noted to chest.  Mother reports pt with hx of pressure injury to his \"bottom\" but that has since healed. No significant weight change. Temporal and BUE muscle wasting observed.   Underweight with BMI 11.8    SUBJECTIVE/OBJECTIVE:     Diet Order: Dysphagia Pureed (NDD2)) Honey  % Eaten:    Patient Vitals for the past 72 hrs:   % Diet Eaten   07/13/17 0910 100 %     Pertinent Medications: [x] Reviewed    Chemistries:  Lab Results   Component Value Date/Time    Sodium 143 07/14/2017 05:31 AM    Potassium 3.6 07/14/2017 05:31 AM    Chloride 110 07/14/2017 05:31 AM    CO2 25 07/14/2017 05:31 AM    Anion gap 8 07/14/2017 05:31 AM    Glucose 91 07/14/2017 05:31 AM    BUN 4 07/14/2017 05:31 AM    Creatinine 0.31 07/14/2017 05:31 AM    BUN/Creatinine ratio 13 07/14/2017 05:31 AM    GFR est AA >60 07/14/2017 05:31 AM    GFR est non-AA >60 07/14/2017 05:31 AM    Calcium 8.3 07/14/2017 05:31 AM    AST (SGOT) 18 07/14/2017 05:31 AM    Alk. phosphatase 96 07/14/2017 05:31 AM    Protein, total 5.8 07/14/2017 05:31 AM    Albumin 2.1 07/14/2017 05:31 AM    Globulin 3.7 07/14/2017 05:31 AM    A-G Ratio 0.6 07/14/2017 05:31 AM    ALT (SGPT) 14 07/14/2017 05:31 AM      Anthropometrics: Height: 5' 7\" (170.2 cm) Weight: 39.7 kg (87 lb 8.4 oz)    IBW (%IBW): 67.3 kg (148 lb 5.9 oz) ( ) UBW (%UBW):   (  %)    BMI: Body mass index is 13.71 kg/(m^2). This BMI is indicative of:   [x] Underweight    [] Normal    [] Overweight    []  Obesity    []  Extreme Obesity (BMI>40)  Estimated Nutrition Needs (Based on): 1193 Kcals/day (35 kcals/Kg ) , 41 g (1.2 g/Kg body wt) Protein  Carbohydrate: At Least 130 g/day  Fluids: 1200 mL/day    Last BM: 7/14   [x]Active     []Hyperactive  []Hypoactive       [] Absent   BS  Skin:    [] Intact   [] Incision  [] Breakdown   [] DTI   [] Tears/Excoriation/Abrasion  []Edema [x] Other: per WOCN 7/10/17: right lateral is red, areas of partial thickness skin loss, related to moisture, present on admission. Areas of red blanching skin on the prominent bones.  Right ear red  Wt Readings from Last 30 Encounters:   07/13/17 39.7 kg (87 lb 8.4 oz)   03/10/16 35.8 kg (79 lb)   03/06/16 35.8 kg (79 lb)   09/06/14 34.9 kg (77 lb)   08/31/14 31.8 kg (70 lb)   12/20/13 34.9 kg (77 lb)   11/22/13 34.9 kg (77 lb)   08/02/12 40.8 kg (90 lb)   06/15/12 45.4 kg (100 lb)      NUTRITION DIAGNOSES:   Problem:  Inadequate oral food/beverage intake Swallowing difficulty   Etiology: related to current medical condition, unable to perform swallowing eval hx dysphagia   Signs/Symptoms: as evidenced by NPO baseline diet at home is Pureed with HTL diet advanced and pt is eating very well at most meals, ONS started today 7/14/17    NUTRITION INTERVENTIONS:  Meals/Snacks: General/healthful diet   Supplements: Commercial supplement              GOAL:   Pt will continue to consume % of meals and will tolearte ONS TID within 4-6 days    Cultural, Yazidi, or Ethnic Dietary Needs:    None per mother    LEARNING NEEDS (Diet, Food/Nutrient-Drug Interaction):    [x] None Identified   [] Identified and Education Provided/Documented   [] Identified and Pt declined/was not appropriate      [x] Interdisciplinary Care Plan Reviewed/Documented    [x] Discharge Needs:    tbd   [] No Nutrition Related Discharge Needs    NUTRITION RISK:   Pt Is At Nutrition Risk  [x]     No Nutrition Risk Identified  []       PT SEEN FOR:    [x]  MD Consult: []Calorie Count      []Diabetic Diet Education        []Diet Education     []Electrolyte Management     []General Nutrition Management and Supplements     []Management of Tube Feeding     []TPN Recommendations    []  RN Referral:  []MST score >=2     []Enteral/Parenteral Nutrition PTA     []Pregnant: Gestational DM or Multigestation                 [] Pressure Ulcer      []  Low BMI      []  Length of Stay       [] Dysphagia Diet         [] Ventilator      [x]  Follow-Up      Previous Recommendations:   [x] Implemented          [] Not Implemented          [] Not Applicable    Previous Goal:   [] Met              [x] Progressing Towards Goal              [] Not Progressing Towards Goal   [] Not Applicable              Salima Lau RD

## 2017-07-15 ENCOUNTER — APPOINTMENT (OUTPATIENT)
Dept: GENERAL RADIOLOGY | Age: 52
DRG: 871 | End: 2017-07-15
Attending: FAMILY MEDICINE
Payer: MEDICARE

## 2017-07-15 LAB
ALBUMIN SERPL BCP-MCNC: 2.2 G/DL (ref 3.5–5)
ALBUMIN/GLOB SERPL: 0.6 {RATIO} (ref 1.1–2.2)
ALP SERPL-CCNC: 95 U/L (ref 45–117)
ALT SERPL-CCNC: 15 U/L (ref 12–78)
ANION GAP BLD CALC-SCNC: 7 MMOL/L (ref 5–15)
AST SERPL W P-5'-P-CCNC: 14 U/L (ref 15–37)
BASOPHILS # BLD AUTO: 0 K/UL (ref 0–0.1)
BASOPHILS # BLD: 0 % (ref 0–1)
BILIRUB SERPL-MCNC: 0.1 MG/DL (ref 0.2–1)
BUN SERPL-MCNC: 2 MG/DL (ref 6–20)
BUN/CREAT SERPL: 6 (ref 12–20)
CALCIUM SERPL-MCNC: 8 MG/DL (ref 8.5–10.1)
CHLORIDE SERPL-SCNC: 109 MMOL/L (ref 97–108)
CO2 SERPL-SCNC: 28 MMOL/L (ref 21–32)
CREAT SERPL-MCNC: 0.36 MG/DL (ref 0.7–1.3)
DIFFERENTIAL METHOD BLD: ABNORMAL
EOSINOPHIL # BLD: 0 K/UL (ref 0–0.4)
EOSINOPHIL NFR BLD: 1 % (ref 0–7)
ERYTHROCYTE [DISTWIDTH] IN BLOOD BY AUTOMATED COUNT: 12.4 % (ref 11.5–14.5)
GLOBULIN SER CALC-MCNC: 3.7 G/DL (ref 2–4)
GLUCOSE SERPL-MCNC: 88 MG/DL (ref 65–100)
HCT VFR BLD AUTO: 30.1 % (ref 36.6–50.3)
HGB BLD-MCNC: 9.9 G/DL (ref 12.1–17)
LYMPHOCYTES # BLD AUTO: 20 % (ref 12–49)
LYMPHOCYTES # BLD: 0.4 K/UL (ref 0.8–3.5)
MAGNESIUM SERPL-MCNC: 1.6 MG/DL (ref 1.6–2.4)
MCH RBC QN AUTO: 31.7 PG (ref 26–34)
MCHC RBC AUTO-ENTMCNC: 32.9 G/DL (ref 30–36.5)
MCV RBC AUTO: 96.5 FL (ref 80–99)
METAMYELOCYTES NFR BLD MANUAL: 2 %
MONOCYTES # BLD: 0.3 K/UL (ref 0–1)
MONOCYTES NFR BLD AUTO: 16 % (ref 5–13)
MYELOCYTES NFR BLD MANUAL: 1 %
NEUTS BAND NFR BLD MANUAL: 2 % (ref 0–6)
NEUTS SEG # BLD: 1.1 K/UL (ref 1.8–8)
NEUTS SEG NFR BLD AUTO: 58 % (ref 32–75)
PLATELET # BLD AUTO: 169 K/UL (ref 150–400)
POTASSIUM SERPL-SCNC: 3.9 MMOL/L (ref 3.5–5.1)
PROT SERPL-MCNC: 5.9 G/DL (ref 6.4–8.2)
RBC # BLD AUTO: 3.12 M/UL (ref 4.1–5.7)
RBC MORPH BLD: ABNORMAL
SODIUM SERPL-SCNC: 144 MMOL/L (ref 136–145)
WBC # BLD AUTO: 1.9 K/UL (ref 4.1–11.1)

## 2017-07-15 PROCEDURE — 74011250636 HC RX REV CODE- 250/636: Performed by: PHYSICIAN ASSISTANT

## 2017-07-15 PROCEDURE — 74011000258 HC RX REV CODE- 258: Performed by: FAMILY MEDICINE

## 2017-07-15 PROCEDURE — 85025 COMPLETE CBC W/AUTO DIFF WBC: CPT | Performed by: FAMILY MEDICINE

## 2017-07-15 PROCEDURE — 94640 AIRWAY INHALATION TREATMENT: CPT

## 2017-07-15 PROCEDURE — 74011000250 HC RX REV CODE- 250: Performed by: INTERNAL MEDICINE

## 2017-07-15 PROCEDURE — 80053 COMPREHEN METABOLIC PANEL: CPT | Performed by: FAMILY MEDICINE

## 2017-07-15 PROCEDURE — 83735 ASSAY OF MAGNESIUM: CPT | Performed by: FAMILY MEDICINE

## 2017-07-15 PROCEDURE — 71010 XR CHEST PORT: CPT

## 2017-07-15 PROCEDURE — 74011250637 HC RX REV CODE- 250/637: Performed by: INTERNAL MEDICINE

## 2017-07-15 PROCEDURE — 36415 COLL VENOUS BLD VENIPUNCTURE: CPT | Performed by: FAMILY MEDICINE

## 2017-07-15 PROCEDURE — 74011250636 HC RX REV CODE- 250/636: Performed by: INTERNAL MEDICINE

## 2017-07-15 PROCEDURE — 74011250636 HC RX REV CODE- 250/636: Performed by: FAMILY MEDICINE

## 2017-07-15 PROCEDURE — 65270000029 HC RM PRIVATE

## 2017-07-15 PROCEDURE — 74011000250 HC RX REV CODE- 250: Performed by: FAMILY MEDICINE

## 2017-07-15 PROCEDURE — 65660000000 HC RM CCU STEPDOWN

## 2017-07-15 PROCEDURE — C9113 INJ PANTOPRAZOLE SODIUM, VIA: HCPCS | Performed by: INTERNAL MEDICINE

## 2017-07-15 PROCEDURE — 74011250637 HC RX REV CODE- 250/637: Performed by: FAMILY MEDICINE

## 2017-07-15 RX ORDER — DIVALPROEX SODIUM 250 MG/1
500 TABLET, DELAYED RELEASE ORAL 2 TIMES DAILY
Status: DISCONTINUED | OUTPATIENT
Start: 2017-07-15 | End: 2017-07-16 | Stop reason: HOSPADM

## 2017-07-15 RX ORDER — PANTOPRAZOLE SODIUM 40 MG/1
40 TABLET, DELAYED RELEASE ORAL
Status: DISCONTINUED | OUTPATIENT
Start: 2017-07-15 | End: 2017-07-16 | Stop reason: HOSPADM

## 2017-07-15 RX ORDER — DIVALPROEX SODIUM 250 MG/1
250 TABLET, DELAYED RELEASE ORAL EVERY EVENING
Status: DISCONTINUED | OUTPATIENT
Start: 2017-07-15 | End: 2017-07-16 | Stop reason: HOSPADM

## 2017-07-15 RX ORDER — DIVALPROEX SODIUM 125 MG/1
125 TABLET, DELAYED RELEASE ORAL DAILY
Status: DISCONTINUED | OUTPATIENT
Start: 2017-07-15 | End: 2017-07-16 | Stop reason: HOSPADM

## 2017-07-15 RX ORDER — DOCUSATE SODIUM 100 MG/1
100 CAPSULE, LIQUID FILLED ORAL
Status: DISCONTINUED | OUTPATIENT
Start: 2017-07-15 | End: 2017-07-16 | Stop reason: HOSPADM

## 2017-07-15 RX ORDER — PHENOBARBITAL 32.4 MG/1
30 TABLET ORAL EVERY 12 HOURS
Status: DISCONTINUED | OUTPATIENT
Start: 2017-07-15 | End: 2017-07-16 | Stop reason: HOSPADM

## 2017-07-15 RX ADMIN — LEVALBUTEROL INHALATION 1.25MG/3ML 1.25 MG: 1.25 SOLUTION RESPIRATORY (INHALATION) at 19:54

## 2017-07-15 RX ADMIN — OXYCODONE HYDROCHLORIDE AND ACETAMINOPHEN 500 MG: 500 TABLET ORAL at 08:45

## 2017-07-15 RX ADMIN — PANTOPRAZOLE SODIUM 40 MG: 40 TABLET, DELAYED RELEASE ORAL at 17:38

## 2017-07-15 RX ADMIN — OXYCODONE HYDROCHLORIDE AND ACETAMINOPHEN 500 MG: 500 TABLET ORAL at 17:35

## 2017-07-15 RX ADMIN — SODIUM CHLORIDE 40 MG: 9 INJECTION INTRAMUSCULAR; INTRAVENOUS; SUBCUTANEOUS at 08:50

## 2017-07-15 RX ADMIN — DOCUSATE SODIUM 100 MG: 100 CAPSULE ORAL at 17:35

## 2017-07-15 RX ADMIN — PHENOBARBITAL 32.4 MG: 32.4 TABLET ORAL at 21:15

## 2017-07-15 RX ADMIN — PHENOBARBITAL SODIUM 30 MG: 65 INJECTION INTRAMUSCULAR; INTRAVENOUS at 08:46

## 2017-07-15 RX ADMIN — DOCUSATE SODIUM 100 MG: 100 CAPSULE ORAL at 08:45

## 2017-07-15 RX ADMIN — VALPROATE SODIUM 625 MG: 100 INJECTION, SOLUTION INTRAVENOUS at 08:41

## 2017-07-15 RX ADMIN — DIVALPROEX SODIUM 125 MG: 250 TABLET, DELAYED RELEASE ORAL at 12:22

## 2017-07-15 RX ADMIN — ACETYLCYSTEINE 400 MG: 200 SOLUTION ORAL; RESPIRATORY (INHALATION) at 08:27

## 2017-07-15 RX ADMIN — LEVOFLOXACIN 750 MG: 5 INJECTION, SOLUTION INTRAVENOUS at 08:43

## 2017-07-15 RX ADMIN — DIVALPROEX SODIUM 250 MG: 250 TABLET, DELAYED RELEASE ORAL at 21:14

## 2017-07-15 RX ADMIN — ACETYLCYSTEINE 400 MG: 200 SOLUTION ORAL; RESPIRATORY (INHALATION) at 19:54

## 2017-07-15 RX ADMIN — DIPHENHYDRAMINE HYDROCHLORIDE 12.5 MG: 50 INJECTION, SOLUTION INTRAMUSCULAR; INTRAVENOUS at 21:15

## 2017-07-15 RX ADMIN — DIVALPROEX SODIUM 500 MG: 250 TABLET, DELAYED RELEASE ORAL at 21:14

## 2017-07-15 RX ADMIN — HEPARIN SODIUM 5000 UNITS: 5000 INJECTION, SOLUTION INTRAVENOUS; SUBCUTANEOUS at 21:15

## 2017-07-15 RX ADMIN — LEVALBUTEROL INHALATION 1.25MG/3ML 1.25 MG: 1.25 SOLUTION RESPIRATORY (INHALATION) at 08:27

## 2017-07-15 RX ADMIN — HEPARIN SODIUM 5000 UNITS: 5000 INJECTION, SOLUTION INTRAVENOUS; SUBCUTANEOUS at 08:48

## 2017-07-15 RX ADMIN — Medication 10 ML: at 21:17

## 2017-07-15 NOTE — PROGRESS NOTES
Bedside and Verbal shift change report given to EULALIO Madera (oncoming nurse) by Darrion Garcia (offgoing nurse). Report included the following information SBAR, Kardex and Recent Results.

## 2017-07-15 NOTE — PROGRESS NOTES
Daily Progress Note: 7/15/2017  Genna Gilmore MD    Assessment/Plan:   1) Sepsis/possible aspiration PNA: 2/4 SIRS on admission. High risk for aspiration. Failed outpatient Abx. Empirically started on IV Zosyn, Azithro (for atypical) at admission and changed to Levaquin IV    Sputum culture shows Enterobacter     2) Acute encephalopathy:  Likely due to sepsis. Monitor closely     3) Hypotension: due to sepsis - improved with IVF - monitor closely     4) Dysphagia/severe protein-jesus malnutrition: has severe muscle wasting.     Passed swallow eval yest. Diet per speech recommendations - Nectar/Pureed     5) Cerebral palsy/seizure d/o: cont depakote, phenobarb        Problem List:  Problem List as of 7/15/2017  Date Reviewed: 7/8/2017          Codes Class Noted - Resolved    * (Principal)Sepsis ([facility] ICD-10-CM: A41.9  ICD-9-CM: 038.9, 995.91  7/8/2017 - Present        Aspiration pneumonia ([facility] ICD-10-CM: J69.0  ICD-9-CM: 507.0  7/8/2017 - Present        Hypotension ICD-10-CM: I95.9  ICD-9-CM: 458.9  7/8/2017 - Present        Acute encephalopathy ICD-10-CM: G93.40  ICD-9-CM: 348.30  7/8/2017 - Present        Fever ICD-10-CM: R50.9  ICD-9-CM: 780.60  8/31/2014 - Present        SIRS (systemic inflammatory response syndrome) ([facility] ICD-10-CM: R65.10  ICD-9-CM: 995.90  8/31/2014 - Present        Cough ICD-10-CM: R05  ICD-9-CM: 786.2  8/31/2014 - Present        Sepsis(995.91) ICD-10-CM: A41.9  ICD-9-CM: 995.91  6/15/2012 - Present        UTI (urinary tract infection) ICD-10-CM: N39.0  ICD-9-CM: 599.0  6/15/2012 - Present    Overview Signed 6/17/2012  3:58 PM by Belén Haynes     Escherichia coli    >100K col/mL             Phenobarbital toxicity ICD-10-CM: T42.3X1A  ICD-9-CM: 967.0, E980.1  6/15/2012 - Present        Cerebral palsy ([facility] ICD-10-CM: G80.9  ICD-9-CM: 343.9  6/15/2012 - Present        Scoliosis ICD-10-CM: M41.9  ICD-9-CM: 737.30  6/15/2012 - Present        Dehydration ICD-10-CM: E86.0  ICD-9-CM: 276.51  6/15/2012 - Present        Leukocytosis, unspecified ICD-10-CM: D72.829  ICD-9-CM: 288.60  6/15/2012 - Present        Encephalopathy ICD-10-CM: G93.40  ICD-9-CM: 348.30  6/15/2012 - Present              Subjective:    48 yo hx of cerebral palsy, bedbound, seizure d/o, presented w/ fever, sepsis, possible aspiration pneumonia. The patient cannot give a history. His mother, who is the POA/care taker, stated that the patient has had a worsening productive cough for several weeks, associated with dyspnea, fevers, chills. He has a hx of frequent aspiration pneumonia. The patient recently saw his PCP who prescribed cefdinir for 10 days, which helped his symptoms, but now worsened again. In the ED, temp was 100. WBC 7.7. SBP ~80s. CXR unremarkable    7/9: Moves spontaneously. Coughs episodically. On IV Zosyn and Azith. CT chest/Ab planned for today. Cont NPO until more awake. K+./Mg+ a little low - replace. 7/10:  CXR last night showed overhydration - placed on O2 facemask and diuresed. Today O2 requirements decreased. More lethargic today. K+ and Mag cont to be low - replace. 7/11:  About the same. GM here and she reports he is not as responsive as usual. I&O not recorded accurately - nursing is correcting. K+ and Mg+ still low - replace. 7/12:  K+ and mag up. Sputum culture with Enterobacter - Sensitive to Rocephin. Still not able to perform swallow eval. Has not had nutrition since he was admitted so will consult GI for TPN and ? PEG. Brother reports that he was much more alert last evening. He is in agreement for nutrition but would want to avoid PEG unless absolutely necessary. 7/13: Passed speech eval yesterday. Diet order per speech recommendations. 7/14: Tolerating pureed diet so far. More alert. Plan is trial of diet for a day or two, and if he does not aspirate then discharge back to home. 7/15:  Continues to tolerate diet well.   He is eating better. Continues to be more alert and is verbalizing in his usual manner per his brother. Switching his seizure meds and PPI back to po. Still coughing with meals but the cough seems effective at clearing airway. Cont antibiotics for now. Recheck CXR today. WBC lower  - recheck. Review of Systems:   Review of systems not obtained due to patient factors. Objective:   Physical Exam:     Visit Vitals    /79 (BP 1 Location: Right arm, BP Patient Position: At rest)    Pulse 66    Temp 98.7 °F (37.1 °C)    Resp 24    Ht 5' 7\" (1.702 m)    Wt 40.1 kg (88 lb 6.5 oz)    SpO2 93%    BMI 13.85 kg/m2    O2 Flow Rate (L/min): 2 l/min O2 Device: Room air    Temp (24hrs), Av °F (36.7 °C), Min:97.5 °F (36.4 °C), Max:98.7 °F (37.1 °C)             General:  Chronically ill-appearing, cachectic, no acute distress   Head:  Normocephalic, without obvious abnormality, atraumatic. Eyes:  Conjunctivae/corneas clear. EOMs intact. Throat: Lips, mucosa, and tongue moist..   Neck: Supple, symmetrical, trachea midline, no adenopathy, thyroid: no enlargement/tenderness/nodules, no carotid bruit and no JVD. Lungs:   Rhonchi on right and clear on left with some clearing with cough    Chest wall:  No tenderness or deformity. Heart:  Regular rate and rhythm, S1, S2 normal, no murmur, click, rub or gallop. Abdomen:   Soft, non-tender. Bowel sounds normal. No masses,  No organomegaly. Extremities: Contractures unchanged. no cyanosis or edema. No apparent calf tenderness or cords. Pulses: 2+ and symmetric all extremities. Skin:  turgor normal.    Neurologic: Non-verbal.  Responds to painful stimuli. Contractures unchanged.         Data Review:       Recent Days:  Recent Labs      07/15/17   0443  17   0531  17   0904   WBC  1.9*  2.8*  2.4*   HGB  9.9*  10.3*  10.8*   HCT  30.1*  30.4*  32.8*   PLT  169  153  115*     Recent Labs      07/15/17   0443  17   0531  17   0904   NA 144  143  140   K  3.9  3.6  3.7   CL  109*  110*  103   CO2  28  25  27   GLU  88  91  71   BUN  2*  4*  9   CREA  0.36*  0.31*  0.34*   CA  8.0*  8.3*  8.3*   MG  1.6   --   1.5*   ALB  2.2*  2.1*   --    TBILI  0.1*  0.1*   --    SGOT  14*  18   --    ALT  15  14   --        24 Hour Results:  Recent Results (from the past 24 hour(s))   CBC WITH AUTOMATED DIFF    Collection Time: 07/15/17  4:43 AM   Result Value Ref Range    WBC 1.9 (L) 4.1 - 11.1 K/uL    RBC 3.12 (L) 4.10 - 5.70 M/uL    HGB 9.9 (L) 12.1 - 17.0 g/dL    HCT 30.1 (L) 36.6 - 50.3 %    MCV 96.5 80.0 - 99.0 FL    MCH 31.7 26.0 - 34.0 PG    MCHC 32.9 30.0 - 36.5 g/dL    RDW 12.4 11.5 - 14.5 %    PLATELET 533 075 - 568 K/uL    NEUTROPHILS 58 32 - 75 %    BAND NEUTROPHILS 2 0 - 6 %    LYMPHOCYTES 20 12 - 49 %    MONOCYTES 16 (H) 5 - 13 %    EOSINOPHILS 1 0 - 7 %    BASOPHILS 0 0 - 1 %    METAMYELOCYTES 2 (H) 0 %    MYELOCYTES 1 (H) 0 %    ABS. NEUTROPHILS 1.1 (L) 1.8 - 8.0 K/UL    ABS. LYMPHOCYTES 0.4 (L) 0.8 - 3.5 K/UL    ABS. MONOCYTES 0.3 0.0 - 1.0 K/UL    ABS. EOSINOPHILS 0.0 0.0 - 0.4 K/UL    ABS. BASOPHILS 0.0 0.0 - 0.1 K/UL    DF MANUAL      RBC COMMENTS NORMOCYTIC, NORMOCHROMIC     METABOLIC PANEL, COMPREHENSIVE    Collection Time: 07/15/17  4:43 AM   Result Value Ref Range    Sodium 144 136 - 145 mmol/L    Potassium 3.9 3.5 - 5.1 mmol/L    Chloride 109 (H) 97 - 108 mmol/L    CO2 28 21 - 32 mmol/L    Anion gap 7 5 - 15 mmol/L    Glucose 88 65 - 100 mg/dL    BUN 2 (L) 6 - 20 MG/DL    Creatinine 0.36 (L) 0.70 - 1.30 MG/DL    BUN/Creatinine ratio 6 (L) 12 - 20      GFR est AA >60 >60 ml/min/1.73m2    GFR est non-AA >60 >60 ml/min/1.73m2    Calcium 8.0 (L) 8.5 - 10.1 MG/DL    Bilirubin, total 0.1 (L) 0.2 - 1.0 MG/DL    ALT (SGPT) 15 12 - 78 U/L    AST (SGOT) 14 (L) 15 - 37 U/L    Alk.  phosphatase 95 45 - 117 U/L    Protein, total 5.9 (L) 6.4 - 8.2 g/dL    Albumin 2.2 (L) 3.5 - 5.0 g/dL    Globulin 3.7 2.0 - 4.0 g/dL    A-G Ratio 0.6 (L) 1.1 - 2.2 MAGNESIUM    Collection Time: 07/15/17  4:43 AM   Result Value Ref Range    Magnesium 1.6 1.6 - 2.4 mg/dL       Medications reviewed  Current Facility-Administered Medications   Medication Dose Route Frequency    loperamide (IMODIUM) capsule 2 mg  2 mg Oral PRN    levoFLOXacin (LEVAQUIN) 750 mg in D5W IVPB  750 mg IntraVENous DAILY    PHENobarbital (LUMINAL) injection 30 mg  30 mg IntraVENous Q12H    heparin (porcine) injection 5,000 Units  5,000 Units SubCUTAneous Q12H    pantoprazole (PROTONIX) 40 mg in sodium chloride 0.9 % 10 mL injection  40 mg IntraVENous DAILY    acetylcysteine (MUCOMYST) 200 mg/mL (20 %) solution 400 mg  2 mL Nebulization BID RT    levalbuterol (XOPENEX) nebulizer soln 1.25 mg/3 mL  1.25 mg Nebulization BID RT    albuterol-ipratropium (DUO-NEB) 2.5 MG-0.5 MG/3 ML  3 mL Nebulization Q4H PRN    ascorbic acid (vitamin C) (VITAMIN C) tablet 500 mg  500 mg Oral BID    polyethylene glycol (MIRALAX) packet 17 g  17 g Oral DAILY PRN    sodium chloride (NS) flush 5-10 mL  5-10 mL IntraVENous Q8H    sodium chloride (NS) flush 5-10 mL  5-10 mL IntraVENous PRN    HYDROmorphone (PF) (DILAUDID) injection 0.2 mg  0.2 mg IntraVENous Q4H PRN    naloxone (NARCAN) injection 0.4 mg  0.4 mg IntraVENous PRN    diphenhydrAMINE (BENADRYL) injection 12.5 mg  12.5 mg IntraVENous Q4H PRN    ondansetron (ZOFRAN) injection 4 mg  4 mg IntraVENous Q6H PRN    docusate sodium (COLACE) capsule 100 mg  100 mg Oral BID    acetaminophen (TYLENOL) suppository 650 mg  650 mg Rectal Q4H PRN    diphenhydrAMINE (BENADRYL) injection 12.5 mg  12.5 mg IntraVENous QHS    valproate (DEPACON) 750 mg in 0.9% sodium chloride 50 mL IVPB  750 mg IntraVENous Q24H    valproate (DEPACON) 625 mg in 0.9% sodium chloride 50 mL IVPB  625 mg IntraVENous Q24H       Care Plan discussed with: Patient/Family and Nurse    Total time spent with patient: 30 minutes.     Franky Wilkins MD

## 2017-07-15 NOTE — PROGRESS NOTES
0710  Report received from off going shift     0937  Pt given meds without any difficulty    1204  Pt sitting up in bed. Mother aware that after pt eats he is to sit up for at least 30 minutes. Able to verbalize the reason behind this and compliant     1634  Pt lying in bed family at bedside voiced no needs at this time     1940  Bedside and Verbal shift change report given to [address]  (oncoming nurse) by Lyla Cerda  (offgoing nurse).  Report included the following information SBAR, Kardex, ED Summary, Intake/Output, MAR, Med Rec Status and Cardiac Rhythm SR.

## 2017-07-16 VITALS
HEART RATE: 78 BPM | HEIGHT: 67 IN | BODY MASS INDEX: 13.98 KG/M2 | TEMPERATURE: 97 F | DIASTOLIC BLOOD PRESSURE: 70 MMHG | WEIGHT: 89.07 LBS | RESPIRATION RATE: 18 BRPM | OXYGEN SATURATION: 95 % | SYSTOLIC BLOOD PRESSURE: 155 MMHG

## 2017-07-16 LAB
ALBUMIN SERPL BCP-MCNC: 2.4 G/DL (ref 3.5–5)
ALBUMIN/GLOB SERPL: 0.6 {RATIO} (ref 1.1–2.2)
ALP SERPL-CCNC: 108 U/L (ref 45–117)
ALT SERPL-CCNC: 14 U/L (ref 12–78)
ANION GAP BLD CALC-SCNC: 5 MMOL/L (ref 5–15)
AST SERPL W P-5'-P-CCNC: 14 U/L (ref 15–37)
BASOPHILS # BLD AUTO: 0 K/UL (ref 0–0.1)
BASOPHILS # BLD: 0 % (ref 0–1)
BILIRUB SERPL-MCNC: 0.2 MG/DL (ref 0.2–1)
BUN SERPL-MCNC: 5 MG/DL (ref 6–20)
BUN/CREAT SERPL: 12 (ref 12–20)
CALCIUM SERPL-MCNC: 9.1 MG/DL (ref 8.5–10.1)
CHLORIDE SERPL-SCNC: 106 MMOL/L (ref 97–108)
CO2 SERPL-SCNC: 29 MMOL/L (ref 21–32)
CREAT SERPL-MCNC: 0.41 MG/DL (ref 0.7–1.3)
DIFFERENTIAL METHOD BLD: ABNORMAL
EOSINOPHIL # BLD: 0 K/UL (ref 0–0.4)
EOSINOPHIL NFR BLD: 0 % (ref 0–7)
ERYTHROCYTE [DISTWIDTH] IN BLOOD BY AUTOMATED COUNT: 12.4 % (ref 11.5–14.5)
GLOBULIN SER CALC-MCNC: 4.1 G/DL (ref 2–4)
GLUCOSE SERPL-MCNC: 84 MG/DL (ref 65–100)
HCT VFR BLD AUTO: 31.3 % (ref 36.6–50.3)
HGB BLD-MCNC: 10.4 G/DL (ref 12.1–17)
LYMPHOCYTES # BLD AUTO: 19 % (ref 12–49)
LYMPHOCYTES # BLD: 0.8 K/UL (ref 0.8–3.5)
MCH RBC QN AUTO: 32 PG (ref 26–34)
MCHC RBC AUTO-ENTMCNC: 33.2 G/DL (ref 30–36.5)
MCV RBC AUTO: 96.3 FL (ref 80–99)
MONOCYTES # BLD: 0.8 K/UL (ref 0–1)
MONOCYTES NFR BLD AUTO: 19 % (ref 5–13)
MYELOCYTES NFR BLD MANUAL: 5 %
NEUTS BAND NFR BLD MANUAL: 1 % (ref 0–6)
NEUTS SEG # BLD: 2.4 K/UL (ref 1.8–8)
NEUTS SEG NFR BLD AUTO: 56 % (ref 32–75)
PLATELET # BLD AUTO: 259 K/UL (ref 150–400)
POTASSIUM SERPL-SCNC: 4.3 MMOL/L (ref 3.5–5.1)
PROT SERPL-MCNC: 6.5 G/DL (ref 6.4–8.2)
RBC # BLD AUTO: 3.25 M/UL (ref 4.1–5.7)
RBC MORPH BLD: ABNORMAL
SODIUM SERPL-SCNC: 140 MMOL/L (ref 136–145)
WBC # BLD AUTO: 4.2 K/UL (ref 4.1–11.1)

## 2017-07-16 PROCEDURE — 74011250637 HC RX REV CODE- 250/637: Performed by: INTERNAL MEDICINE

## 2017-07-16 PROCEDURE — 94640 AIRWAY INHALATION TREATMENT: CPT

## 2017-07-16 PROCEDURE — 74011000250 HC RX REV CODE- 250: Performed by: INTERNAL MEDICINE

## 2017-07-16 PROCEDURE — 85025 COMPLETE CBC W/AUTO DIFF WBC: CPT | Performed by: FAMILY MEDICINE

## 2017-07-16 PROCEDURE — 36415 COLL VENOUS BLD VENIPUNCTURE: CPT | Performed by: FAMILY MEDICINE

## 2017-07-16 PROCEDURE — 74011250636 HC RX REV CODE- 250/636: Performed by: FAMILY MEDICINE

## 2017-07-16 PROCEDURE — 74011250637 HC RX REV CODE- 250/637: Performed by: FAMILY MEDICINE

## 2017-07-16 PROCEDURE — 80053 COMPREHEN METABOLIC PANEL: CPT | Performed by: FAMILY MEDICINE

## 2017-07-16 RX ORDER — LEVOFLOXACIN 500 MG/1
500 TABLET, FILM COATED ORAL DAILY
Qty: 7 TAB | Refills: 0 | Status: SHIPPED | OUTPATIENT
Start: 2017-07-16 | End: 2018-06-23

## 2017-07-16 RX ADMIN — DIVALPROEX SODIUM 125 MG: 250 TABLET, DELAYED RELEASE ORAL at 09:58

## 2017-07-16 RX ADMIN — HEPARIN SODIUM 5000 UNITS: 5000 INJECTION, SOLUTION INTRAVENOUS; SUBCUTANEOUS at 09:59

## 2017-07-16 RX ADMIN — PANTOPRAZOLE SODIUM 40 MG: 40 TABLET, DELAYED RELEASE ORAL at 06:43

## 2017-07-16 RX ADMIN — ACETYLCYSTEINE 400 MG: 200 SOLUTION ORAL; RESPIRATORY (INHALATION) at 08:16

## 2017-07-16 RX ADMIN — Medication 10 ML: at 06:43

## 2017-07-16 RX ADMIN — PHENOBARBITAL 32.4 MG: 32.4 TABLET ORAL at 09:58

## 2017-07-16 RX ADMIN — DOCUSATE SODIUM 100 MG: 100 CAPSULE ORAL at 09:58

## 2017-07-16 RX ADMIN — DIVALPROEX SODIUM 500 MG: 250 TABLET, DELAYED RELEASE ORAL at 09:59

## 2017-07-16 RX ADMIN — LEVALBUTEROL INHALATION 1.25MG/3ML 1.25 MG: 1.25 SOLUTION RESPIRATORY (INHALATION) at 08:16

## 2017-07-16 RX ADMIN — OXYCODONE HYDROCHLORIDE AND ACETAMINOPHEN 500 MG: 500 TABLET ORAL at 09:59

## 2017-07-16 NOTE — PROGRESS NOTES
EMR reviewed. Pt used [facility] in the past and would like to use them again. Referral faxed to [facility] via direct fax at [phone]. Thanks.   Celine Tamayo LCSW

## 2017-07-16 NOTE — DISCHARGE SUMMARY
Physician Discharge Summary     Patient ID:    Delphine Li  [id number]  47 y.o.  1965  Renae Munguia MD    Admit date: 7/8/2017  Discharge date and time: 7/16/2017  Admission Diagnoses: Sepsis ([facility]  Chronic Diagnoses:    Problem List as of 7/16/2017  Date Reviewed: 7/8/2017          Codes Class Noted - Resolved    * (Principal)Sepsis ([facility].) ICD-10-CM: A41.9  ICD-9-CM: 038.9, 995.91  7/8/2017 - Present        Aspiration pneumonia ([facility] ICD-10-CM: J69.0  ICD-9-CM: 507.0  7/8/2017 - Present        Hypotension ICD-10-CM: I95.9  ICD-9-CM: 458.9  7/8/2017 - Present        Acute encephalopathy ICD-10-CM: G93.40  ICD-9-CM: 348.30  7/8/2017 - Present        Fever ICD-10-CM: R50.9  ICD-9-CM: 780.60  8/31/2014 - Present        SIRS (systemic inflammatory response syndrome) ([facility] ICD-10-CM: R65.10  ICD-9-CM: 995.90  8/31/2014 - Present        Cough ICD-10-CM: R05  ICD-9-CM: 786.2  8/31/2014 - Present        Sepsis(995.91) ICD-10-CM: A41.9  ICD-9-CM: 995.91  6/15/2012 - Present        UTI (urinary tract infection) ICD-10-CM: N39.0  ICD-9-CM: 599.0  6/15/2012 - Present    Overview Signed 6/17/2012  3:58 PM by Terrell Sliva     Escherichia coli    >100K col/mL             Phenobarbital toxicity ICD-10-CM: T42.3X1A  ICD-9-CM: 967.0, E980.1  6/15/2012 - Present        Cerebral palsy ([facility] ICD-10-CM: G80.9  ICD-9-CM: 343.9  6/15/2012 - Present        Scoliosis ICD-10-CM: M41.9  ICD-9-CM: 737.30  6/15/2012 - Present        Dehydration ICD-10-CM: E86.0  ICD-9-CM: 276.51  6/15/2012 - Present        Leukocytosis, unspecified ICD-10-CM: D72.829  ICD-9-CM: 288.60  6/15/2012 - Present        Encephalopathy ICD-10-CM: G93.40  ICD-9-CM: 348.30  6/15/2012 - Present              Discharge Medications:   Current Discharge Medication List      START taking these medications    Details   levoFLOXacin (LEVAQUIN) 500 mg tablet Take 1 Tab by mouth daily.   Qty: 7 Tab, Refills: 0         CONTINUE these medications which have NOT CHANGED Details   ascorbic acid, vitamin C, powd Take 1,000 mg by mouth daily. Mix with liquid prior to administration. omeprazole (PRILOSEC) 20 mg capsule Take 20 mg by mouth Before breakfast and dinner. PHENobarbital (LUMINAL) 30 mg tablet Take 30 mg by mouth two (2) times a day. !! divalproex DR (DEPAKOTE) 125 mg tablet Take 125 mg by mouth daily. !! divalproex DR (DEPAKOTE) 125 mg tablet Take 250 mg by mouth every evening. guaiFENesin-dextromethorphan (G-FENESIN DM)  mg tab tablet Take 1 Tab by mouth every four (4) hours as needed. acetaminophen (TYLENOL) 500 mg tablet Take 1,000 mg by mouth every six (6) hours as needed for Pain. DOCUSATE CALCIUM (STOOL SOFTENER PO) Take 1 tablet by mouth two (2) times daily as needed. !! divalproex DR (DEPAKOTE) 500 mg tablet Take 1 Tab by mouth two (2) times a day. Qty: 60 Tab, Refills: 0    Comments: Needs a follow up for additional refills  Associated Diagnoses: Epilepsy ([facility]      triamcinolone acetonide (KENALOG) 0.1 % topical cream Apply  to affected area two (2) times daily as needed for Skin Irritation. use thin layer       !! - Potential duplicate medications found. Please discuss with provider. Follow up Care:    1. Kedar Wall MD with in 1 weeks  2. specialists as directed. Diet:  Dysphagia diet-pureed and Nectar thick liquids  Disposition:  Home.   Advanced Directive:  Discharge Exam:  [See today's progress note.]  CONSULTATIONS: Pulmonary/Intensive care    Significant Diagnostic Studies:   Recent Labs      07/16/17   0316  07/15/17   0443   WBC  4.2  1.9*   HGB  10.4*  9.9*   HCT  31.3*  30.1*   PLT  259  169     Recent Labs      07/16/17   0316  07/15/17   0443  07/14/17   0531   NA  140  144  143   K  4.3  3.9  3.6   CL  106  109*  110*   CO2  29  28  25   BUN  5*  2*  4*   CREA  0.41*  0.36*  0.31*   GLU  84  88  91   CA  9.1  8.0*  8.3*   MG   --   1.6   --      Recent Labs      07/16/17   0316  07/15/17 0001  07/14/17   0531   SGOT  14*  14*  18   ALT  14  15  14   AP  108  95  96   TBILI  0.2  0.1*  0.1*   TP  6.5  5.9*  5.8*   ALB  2.4*  2.2*  2.1*   GLOB  4.1*  3.7  3.7     Lab Results   Component Value Date/Time    Glucose (POC) 91 08/02/2012 08:18 PM     Lab Results   Component Value Date/Time    TSH 0.51 06/16/2012 04:45 AM     HOSPITAL COURSE & TREATMENT RENDERED:   1. See today's progress note:    Daily Progress Note and Discharge Note: 7/16/2017  Genna Gilmore MD         Assessment/Plan:   1) Sepsis/possible aspiration PNA: 2/4 SIRS on admission.  Chronic risk for aspiration.  Failed outpatient Abx.      Empirically started on IV Zosyn, Azithro (for atypical) at admission and changed to Levaquin IV and po at discharge    Sputum culture revealed Enterobacter      2) Acute encephalopathy:  Likely due to sepsis.       Monitor closely      3) Hypotension: due to sepsis - improved with IVF       4) Dysphagia/severe protein-jesus malnutrition: has severe muscle wasting.    Passed swallow eval yest. Diet per speech recommendations - Nectar/Pureed      5) Cerebral palsy/seizure d/o: cont depakote, phenobarb - initially IV and now po           Subjective:    48 yo hx of cerebral palsy, bedbound, seizure d/o, presented w/ fever, sepsis, possible aspiration pneumonia.  The patient cannot give a history.  His mother, who is the POA/care taker, stated that the patient has had a worsening productive cough for several weeks, associated with dyspnea, fevers, chills.  He has a hx of frequent aspiration pneumonia.  The patient recently saw his PCP who prescribed cefdinir for 10 days, which helped his symptoms, but now worsened again.  In the ED, temp was 100.  WBC 7.7.  SBP ~80s.  CXR unremarkable     7/9: Moves spontaneously. Coughs episodically. On IV Zosyn and Azith. CT chest/Ab planned for today. Cont NPO until more awake.  K+./Mg+ a little low - replace.     7/10:  CXR last night showed overhydration - placed on O2 facemask and diuresed. Today O2 requirements decreased. More lethargic today. K+ and Mag cont to be low - replace.      :  About the same. GM here and she reports he is not as responsive as usual. I&O not recorded accurately - nursing is correcting. K+ and Mg+ still low - replace.       :  K+ and mag up. Sputum culture with Enterobacter - Sensitive to Rocephin. Still not able to perform swallow eval. Has not had nutrition since he was admitted so will consult GI for TPN and ? PEG. Brother reports that he was much more alert last evening. He is in agreement for nutrition but would want to avoid PEG unless absolutely necessary.       : Passed speech eval yesterday. Diet order per speech recommendations.      : Tolerating pureed diet so far. More alert. Plan is trial of diet for a day or two, and if he does not aspirate then discharge back to home.      7/15:  Continues to tolerate diet well. He is eating better. Continues to be more alert and is verbalizing in his usual manner per his brother. Switching his seizure meds and PPI back to po. Still coughing with meals but the cough seems effective at clearing airway. Cont antibiotics for now. Recheck CXR today. WBC lower  - recheck.      : Tolerating diet and meds po and if tolerated then home later today. Still not back to usual mental status mother reports but has improved.   WBC back up to normal.  Follow up with Dr Maria Elena Watkins later this wk.       Review of Systems:   Review of systems not obtained due to patient factors.     Objective:   Physical Exam:           Visit Vitals    BP (P) 155/70    Pulse (P) 78    Temp (P) 97 °F (36.1 °C)    Resp (P) 18    Ht 5' 7\" (1.702 m)    Wt 40.4 kg (89 lb 1.1 oz)    SpO2 95%    BMI 13.95 kg/m2    O2 Flow Rate (L/min): 2 l/min O2 Device: Room air     Temp (24hrs), Av.3 °F (36.3 °C), Min:96.3 °F (35.7 °C), Max:97.8 °F (36.6 °C)              General:  Chronically ill-appearing, cachectic, no acute distress   Head:  Normocephalic, without obvious abnormality, atraumatic. Eyes:  Conjunctivae/corneas clear. EOMs intact. Throat: Lips, mucosa, and tongue moist..   Neck: Supple, symmetrical, trachea midline, no adenopathy, thyroid: no enlargement/tenderness/nodules, no carotid bruit and no JVD. Lungs:   Rhonchi on right and clear on left with some clearing with cough    Chest wall:  No tenderness or deformity. Heart:  Regular rate and rhythm, S1, S2 normal, no murmur, click, rub or gallop. Abdomen:   Soft, non-tender. Bowel sounds normal. No masses,  No organomegaly. Extremities: Contractures unchanged. no cyanosis or edema. No apparent calf tenderness or cords. Pulses: 2+ and symmetric all extremities. Skin:  turgor normal.    Neurologic: Non-verbal.  Responds to painful stimuli. Opens and closes eyes.   Contractures unchanged.           Signed:  Carloz Morales MD  7/16/2017  9:39 AM

## 2017-07-16 NOTE — DISCHARGE INSTRUCTIONS
Patient Discharge Instructions    Isidoro Good / [id number] : 1965    Admitted 2017 Discharged: 2017 9:38 AM     ACUTE DIAGNOSES:  Sepsis ([facility]    CHRONIC MEDICAL DIAGNOSES:  Problem List as of 2017  Date Reviewed: 2017          Codes Class Noted - Resolved    * (Principal)Sepsis ([place] [address] ICD-10-CM: A41.9  ICD-9-CM: 038.9, 995.91  2017 - Present        Aspiration pneumonia ([facility] ICD-10-CM: J69.0  ICD-9-CM: 507.0  2017 - Present        Hypotension ICD-10-CM: I95.9  ICD-9-CM: 458.9  2017 - Present        Acute encephalopathy ICD-10-CM: G93.40  ICD-9-CM: 348.30  2017 - Present        Fever ICD-10-CM: R50.9  ICD-9-CM: 780.60  2014 - Present        SIRS (systemic inflammatory response syndrome) ([place] [address] ICD-10-CM: R65.10  ICD-9-CM: 995.90  2014 - Present        Cough ICD-10-CM: R05  ICD-9-CM: 786.2  2014 - Present        Sepsis(995.91) ICD-10-CM: A41.9  ICD-9-CM: 995.91  6/15/2012 - Present        UTI (urinary tract infection) ICD-10-CM: N39.0  ICD-9-CM: 599.0  6/15/2012 - Present    Overview Signed 2012  3:58 PM by Beauty See     Escherichia coli    >100K col/mL             Phenobarbital toxicity ICD-10-CM: T42.3X1A  ICD-9-CM: 967.0, E980.1  6/15/2012 - Present        Cerebral palsy ([facility] ICD-10-CM: G80.9  ICD-9-CM: 343.9  6/15/2012 - Present        Scoliosis ICD-10-CM: M41.9  ICD-9-CM: 737.30  6/15/2012 - Present        Dehydration ICD-10-CM: E86.0  ICD-9-CM: 276.51  6/15/2012 - Present        Leukocytosis, unspecified ICD-10-CM: D72.829  ICD-9-CM: 288.60  6/15/2012 - Present        Encephalopathy ICD-10-CM: G93.40  ICD-9-CM: 348.30  6/15/2012 - Present              DISCHARGE MEDICATIONS:         · It is important that you take the medication exactly as they are prescribed. · Keep your medication in the bottles provided by the pharmacist and keep a list of the medication names, dosages, and times to be taken in your wallet.    · Do not take other medications without consulting your doctor. DIET:  Pureed/nectar    ACTIVITY: Activity as tolerated    ADDITIONAL INFORMATION: If you experience any of the following symptoms then please call your primary care physician or return to the emergency room if you cannot get hold of your doctor: Fever, chills, nausea, vomiting, diarrhea, change in mentation, falling, bleeding, shortness of breath. FOLLOW UP CARE:  Dr. Odalis Domínguez MD  you are to call and set up an appointment to see them with in 1 week. Follow-up with specialists at directed by them      Information obtained by :  I understand that if any problems occur once I am at home I am to contact my physician. I understand and acknowledge receipt of the instructions indicated above.                                                                                                                                            Physician's or R.N.'s Signature                                                                  Date/Time                                                                                                                                              Patient or Representative Signature                                                          Date/Time

## 2017-07-16 NOTE — PROGRESS NOTES
Primary Nurse Bk Vilchis RN and Dominga Clark RN performed a dual skin assessment on this patient Impairment noted- see wound doc flow sheet  Glenn score is 12

## 2017-07-16 NOTE — PROGRESS NOTES
Bedside shift change report given to Vladimir Pop RN (oncoming nurse) by Dawood Boudreaux RN (offgoing nurse). Report included the following information SBAR, Kardex and MAR.

## 2017-07-16 NOTE — PROGRESS NOTES
Daily Progress Note and Discharge Note: 7/16/2017  Tomer Castro MD    Assessment/Plan:   1) Sepsis/possible aspiration PNA: 2/4 SIRS on admission. Chronic risk for aspiration. Failed outpatient Abx. Empirically started on IV Zosyn, Azithro (for atypical) at admission and changed to Levaquin IV and po at discharge    Sputum culture revealed Enterobacter     2) Acute encephalopathy:  Likely due to sepsis. Monitor closely     3) Hypotension: due to sepsis - improved with IVF      4) Dysphagia/severe protein-jesus malnutrition: has severe muscle wasting.     Passed swallow eval yest. Diet per speech recommendations - Nectar/Pureed     5) Cerebral palsy/seizure d/o: cont depakote, phenobarb - initially IV and now po        Problem List:  Problem List as of 7/16/2017  Date Reviewed: 7/8/2017          Codes Class Noted - Resolved    * (Principal)Sepsis ([facility] ICD-10-CM: A41.9  ICD-9-CM: 038.9, 995.91  7/8/2017 - Present        Aspiration pneumonia ([facility] ICD-10-CM: J69.0  ICD-9-CM: 507.0  7/8/2017 - Present        Hypotension ICD-10-CM: I95.9  ICD-9-CM: 458.9  7/8/2017 - Present        Acute encephalopathy ICD-10-CM: G93.40  ICD-9-CM: 348.30  7/8/2017 - Present        Fever ICD-10-CM: R50.9  ICD-9-CM: 780.60  8/31/2014 - Present        SIRS (systemic inflammatory response syndrome) ([facility] ICD-10-CM: R65.10  ICD-9-CM: 995.90  8/31/2014 - Present        Cough ICD-10-CM: R05  ICD-9-CM: 786.2  8/31/2014 - Present        Sepsis(995.91) ICD-10-CM: A41.9  ICD-9-CM: 995.91  6/15/2012 - Present        UTI (urinary tract infection) ICD-10-CM: N39.0  ICD-9-CM: 599.0  6/15/2012 - Present    Overview Signed 6/17/2012  3:58 PM by Mariam May     Escherichia coli    >100K col/mL             Phenobarbital toxicity ICD-10-CM: T42.3X1A  ICD-9-CM: 967.0, E980.1  6/15/2012 - Present        Cerebral palsy ([facility] [address] [facility] ICD-10-CM: G80.9  ICD-9-CM: 343.9  6/15/2012 - Present        Scoliosis ICD-10-CM: M41.9  ICD-9-CM: 737.30  6/15/2012 - Present        Dehydration ICD-10-CM: E86.0  ICD-9-CM: 276.51  6/15/2012 - Present        Leukocytosis, unspecified ICD-10-CM: D72.829  ICD-9-CM: 288.60  6/15/2012 - Present        Encephalopathy ICD-10-CM: G93.40  ICD-9-CM: 348.30  6/15/2012 - Present              Subjective:    46 yo hx of cerebral palsy, bedbound, seizure d/o, presented w/ fever, sepsis, possible aspiration pneumonia. The patient cannot give a history. His mother, who is the POA/care taker, stated that the patient has had a worsening productive cough for several weeks, associated with dyspnea, fevers, chills. He has a hx of frequent aspiration pneumonia. The patient recently saw his PCP who prescribed cefdinir for 10 days, which helped his symptoms, but now worsened again. In the ED, temp was 100. WBC 7.7. SBP ~80s. CXR unremarkable    7/9: Moves spontaneously. Coughs episodically. On IV Zosyn and Azith. CT chest/Ab planned for today. Cont NPO until more awake. K+./Mg+ a little low - replace. 7/10:  CXR last night showed overhydration - placed on O2 facemask and diuresed. Today O2 requirements decreased. More lethargic today. K+ and Mag cont to be low - replace. 7/11:  About the same. GM here and she reports he is not as responsive as usual. I&O not recorded accurately - nursing is correcting. K+ and Mg+ still low - replace. 7/12:  K+ and mag up. Sputum culture with Enterobacter - Sensitive to Rocephin. Still not able to perform swallow eval. Has not had nutrition since he was admitted so will consult GI for TPN and ? PEG. Brother reports that he was much more alert last evening. He is in agreement for nutrition but would want to avoid PEG unless absolutely necessary. 7/13: Passed speech eval yesterday. Diet order per speech recommendations. 7/14: Tolerating pureed diet so far. More alert. Plan is trial of diet for a day or two, and if he does not aspirate then discharge back to home. 7/15:  Continues to tolerate diet well. He is eating better. Continues to be more alert and is verbalizing in his usual manner per his brother. Switching his seizure meds and PPI back to po. Still coughing with meals but the cough seems effective at clearing airway. Cont antibiotics for now. Recheck CXR today. WBC lower  - recheck. : Tolerating diet and meds po and if tolerated then home later today. Still not back to usual mental status mother reports but has improved. WBC back up to normal.  Follow up with Dr Penny Steve later this wk. Review of Systems:   Review of systems not obtained due to patient factors. Objective:   Physical Exam:     Visit Vitals    BP (P) 155/70    Pulse (P) 78    Temp (P) 97 °F (36.1 °C)    Resp (P) 18    Ht 5' 7\" (1.702 m)    Wt 40.4 kg (89 lb 1.1 oz)    SpO2 95%    BMI 13.95 kg/m2    O2 Flow Rate (L/min): 2 l/min O2 Device: Room air    Temp (24hrs), Av.3 °F (36.3 °C), Min:96.3 °F (35.7 °C), Max:97.8 °F (36.6 °C)             General:  Chronically ill-appearing, cachectic, no acute distress   Head:  Normocephalic, without obvious abnormality, atraumatic. Eyes:  Conjunctivae/corneas clear. EOMs intact. Throat: Lips, mucosa, and tongue moist..   Neck: Supple, symmetrical, trachea midline, no adenopathy, thyroid: no enlargement/tenderness/nodules, no carotid bruit and no JVD. Lungs:   Rhonchi on right and clear on left with some clearing with cough    Chest wall:  No tenderness or deformity. Heart:  Regular rate and rhythm, S1, S2 normal, no murmur, click, rub or gallop. Abdomen:   Soft, non-tender. Bowel sounds normal. No masses,  No organomegaly. Extremities: Contractures unchanged. no cyanosis or edema. No apparent calf tenderness or cords. Pulses: 2+ and symmetric all extremities. Skin:  turgor normal.    Neurologic: Non-verbal.  Responds to painful stimuli. Opens and closes eyes. Contractures unchanged.         Data Review: Recent Days:  Recent Labs      07/16/17   0316  07/15/17   0443  07/14/17   0531   WBC  4.2  1.9*  2.8*   HGB  10.4*  9.9*  10.3*   HCT  31.3*  30.1*  30.4*   PLT  259  169  153     Recent Labs      07/16/17   0316  07/15/17   0443  07/14/17   0531  07/13/17   0904   NA  140  144  143  140   K  4.3  3.9  3.6  3.7   CL  106  109*  110*  103   CO2  29  28  25  27   GLU  84  88  91  71   BUN  5*  2*  4*  9   CREA  0.41*  0.36*  0.31*  0.34*   CA  9.1  8.0*  8.3*  8.3*   MG   --   1.6   --   1.5*   ALB  2.4*  2.2*  2.1*   --    TBILI  0.2  0.1*  0.1*   --    SGOT  14*  14*  18   --    ALT  14  15  14   --        24 Hour Results:  Recent Results (from the past 24 hour(s))   CBC WITH AUTOMATED DIFF    Collection Time: 07/16/17  3:16 AM   Result Value Ref Range    WBC 4.2 4.1 - 11.1 K/uL    RBC 3.25 (L) 4.10 - 5.70 M/uL    HGB 10.4 (L) 12.1 - 17.0 g/dL    HCT 31.3 (L) 36.6 - 50.3 %    MCV 96.3 80.0 - 99.0 FL    MCH 32.0 26.0 - 34.0 PG    MCHC 33.2 30.0 - 36.5 g/dL    RDW 12.4 11.5 - 14.5 %    PLATELET 935 286 - 611 K/uL    NEUTROPHILS 56 32 - 75 %    BAND NEUTROPHILS 1 0 - 6 %    LYMPHOCYTES 19 12 - 49 %    MONOCYTES 19 (H) 5 - 13 %    EOSINOPHILS 0 0 - 7 %    BASOPHILS 0 0 - 1 %    MYELOCYTES 5 (H) 0 %    ABS. NEUTROPHILS 2.4 1.8 - 8.0 K/UL    ABS. LYMPHOCYTES 0.8 0.8 - 3.5 K/UL    ABS. MONOCYTES 0.8 0.0 - 1.0 K/UL    ABS. EOSINOPHILS 0.0 0.0 - 0.4 K/UL    ABS.  BASOPHILS 0.0 0.0 - 0.1 K/UL    DF MANUAL      RBC COMMENTS NORMOCYTIC, NORMOCHROMIC     METABOLIC PANEL, COMPREHENSIVE    Collection Time: 07/16/17  3:16 AM   Result Value Ref Range    Sodium 140 136 - 145 mmol/L    Potassium 4.3 3.5 - 5.1 mmol/L    Chloride 106 97 - 108 mmol/L    CO2 29 21 - 32 mmol/L    Anion gap 5 5 - 15 mmol/L    Glucose 84 65 - 100 mg/dL    BUN 5 (L) 6 - 20 MG/DL    Creatinine 0.41 (L) 0.70 - 1.30 MG/DL    BUN/Creatinine ratio 12 12 - 20      GFR est AA >60 >60 ml/min/1.73m2    GFR est non-AA >60 >60 ml/min/1.73m2    Calcium 9.1 8.5 - 10.1 MG/DL    Bilirubin, total 0.2 0.2 - 1.0 MG/DL    ALT (SGPT) 14 12 - 78 U/L    AST (SGOT) 14 (L) 15 - 37 U/L    Alk.  phosphatase 108 45 - 117 U/L    Protein, total 6.5 6.4 - 8.2 g/dL    Albumin 2.4 (L) 3.5 - 5.0 g/dL    Globulin 4.1 (H) 2.0 - 4.0 g/dL    A-G Ratio 0.6 (L) 1.1 - 2.2         Medications reviewed  Current Facility-Administered Medications   Medication Dose Route Frequency    divalproex DR (DEPAKOTE) tablet 125 mg  125 mg Oral DAILY    divalproex DR (DEPAKOTE) tablet 250 mg  250 mg Oral QPM    PHENobarbital (LUMINAL) tablet 32.4 mg  32.4 mg Oral Q12H    divalproex DR (DEPAKOTE) tablet 500 mg  500 mg Oral BID    docusate sodium (COLACE) capsule 100 mg  100 mg Oral BID PRN    pantoprazole (PROTONIX) tablet 40 mg  40 mg Oral ACB&D    loperamide (IMODIUM) capsule 2 mg  2 mg Oral PRN    levoFLOXacin (LEVAQUIN) 750 mg in D5W IVPB  750 mg IntraVENous DAILY    heparin (porcine) injection 5,000 Units  5,000 Units SubCUTAneous Q12H    acetylcysteine (MUCOMYST) 200 mg/mL (20 %) solution 400 mg  2 mL Nebulization BID RT    levalbuterol (XOPENEX) nebulizer soln 1.25 mg/3 mL  1.25 mg Nebulization BID RT    albuterol-ipratropium (DUO-NEB) 2.5 MG-0.5 MG/3 ML  3 mL Nebulization Q4H PRN    ascorbic acid (vitamin C) (VITAMIN C) tablet 500 mg  500 mg Oral BID    polyethylene glycol (MIRALAX) packet 17 g  17 g Oral DAILY PRN    sodium chloride (NS) flush 5-10 mL  5-10 mL IntraVENous Q8H    sodium chloride (NS) flush 5-10 mL  5-10 mL IntraVENous PRN    HYDROmorphone (PF) (DILAUDID) injection 0.2 mg  0.2 mg IntraVENous Q4H PRN    naloxone (NARCAN) injection 0.4 mg  0.4 mg IntraVENous PRN    diphenhydrAMINE (BENADRYL) injection 12.5 mg  12.5 mg IntraVENous Q4H PRN    ondansetron (ZOFRAN) injection 4 mg  4 mg IntraVENous Q6H PRN    docusate sodium (COLACE) capsule 100 mg  100 mg Oral BID    acetaminophen (TYLENOL) suppository 650 mg  650 mg Rectal Q4H PRN    diphenhydrAMINE (BENADRYL) injection 12.5 mg 12.5 mg IntraVENous QHS       Care Plan discussed with: Patient/Family and Nurse  Total time spent with patient: 30 minutes.     Ashley Wray MD

## 2017-08-05 ENCOUNTER — HOSPITAL ENCOUNTER (EMERGENCY)
Age: 52
Discharge: HOME | End: 2017-08-05
Attending: EMERGENCY MEDICINE
Payer: MEDICARE

## 2017-08-05 VITALS
HEIGHT: 67 IN | WEIGHT: 85 LBS | HEART RATE: 87 BPM | SYSTOLIC BLOOD PRESSURE: 108 MMHG | BODY MASS INDEX: 13.34 KG/M2 | DIASTOLIC BLOOD PRESSURE: 72 MMHG | TEMPERATURE: 97.7 F | OXYGEN SATURATION: 97 % | RESPIRATION RATE: 14 BRPM

## 2017-08-05 DIAGNOSIS — B37.2 CANDIDAL DERMATITIS: Primary | ICD-10-CM

## 2017-08-05 PROCEDURE — 99283 EMERGENCY DEPT VISIT LOW MDM: CPT

## 2017-08-05 RX ORDER — NYSTATIN AND TRIAMCINOLONE ACETONIDE 100000; 1 [USP'U]/G; MG/G
OINTMENT TOPICAL 2 TIMES DAILY
Qty: 30 G | Refills: 0 | Status: SHIPPED | OUTPATIENT
Start: 2017-08-05 | End: 2018-06-23

## 2017-08-05 NOTE — ED PROVIDER NOTES
HPI Comments: The patient is a 73-year-old male who presents to the emergency room for evaluation of perirectal skin bleeding. His mother states this has been ongoing for a week and he has also had a diffuse rash on bilateral buttocks. History of cerebral palsy, wheelchair-bound and in depends. Stools have been soft. Mother notes rectal bleeding when she wipes. She has not noticed any blood in the stool. No dark or tarry stools. There are no other acute healthcare concerns at this time. Mother, denies fevers, chills, night sweats, chest pain, pressure, SOB, FLORES, PND, orthopnea, abdominal pain, n/v/d, melena, hematuria, dysuria, constipation, HA, dizziness, and syncope      Past Medical History:  No date: Aspiration pneumonia (HCC)  No date: Cerebral palsy ([facility] [address] [facility]  No date: Ill-defined condition      Comment: dsyphasia  No date: Scoliosis  No date: Seizure disorder ([facility]  No date: Seizures ([facility]    Past Surgical History:  No date: HX HEENT    PCP:  Hiral Borja MD      Patient is a 47 y.o. male presenting with anal bleeding. The history is provided by the patient. Rectal Bleeding    Pertinent negatives include no abdominal pain, no dysuria, no chills, no fever, no nausea, no back pain, no vomiting and no constipation.         Past Medical History:   Diagnosis Date    Aspiration pneumonia (HCC)     Cerebral palsy (HCC)     Ill-defined condition     dsyphasia    Scoliosis     Seizure disorder (HCC)     Seizures (HCC)        Past Surgical History:   Procedure Laterality Date    HX HEENT           Family History:   Problem Relation Age of Onset    Cancer Father      prostate    Cancer Maternal Aunt     Heart Disease Maternal Aunt     Cancer Maternal Grandmother     Cancer Maternal Grandfather     Cancer Paternal Grandmother     Cancer Paternal Grandfather     Cancer Other        Social History     Social History    Marital status: SINGLE     Spouse name: N/A    Number of children: N/A    Years of education: N/A     Occupational History    Not on file. Social History Main Topics    Smoking status: Never Smoker    Smokeless tobacco: Never Used    Alcohol use No    Drug use: No    Sexual activity: Not on file     Other Topics Concern    Not on file     Social History Narrative         ALLERGIES: Other medication    Review of Systems   Reason unable to perform ROS: mother provides history    Constitutional: Negative for activity change, appetite change, chills, diaphoresis, fatigue, fever and unexpected weight change. HENT: Negative for congestion, ear pain, rhinorrhea, sinus pressure, sore throat, tinnitus, trouble swallowing and voice change. Eyes: Negative for pain, discharge, redness and visual disturbance. Respiratory: Negative for apnea, cough, choking, chest tightness, shortness of breath, wheezing and stridor. Cardiovascular: Negative for chest pain, palpitations and leg swelling. Gastrointestinal: Positive for anal bleeding. Negative for abdominal pain, constipation, nausea and vomiting. Endocrine: Negative for cold intolerance and heat intolerance. Genitourinary: Negative for difficulty urinating, dysuria, flank pain, hematuria, testicular pain and urgency. Musculoskeletal: Negative for arthralgias, back pain, gait problem, joint swelling, myalgias, neck pain and neck stiffness. Skin: Positive for rash. Negative for color change, pallor and wound. Allergic/Immunologic: Negative for immunocompromised state. Neurological: Negative for dizziness, tremors, syncope, weakness, light-headedness, numbness and headaches. Hematological: Does not bruise/bleed easily. Psychiatric/Behavioral: Negative for agitation, confusion and suicidal ideas. Vitals:    08/05/17 1020   BP: 108/72   Pulse: 87   Resp: 14   Temp: 97.7 °F (36.5 °C)   SpO2: 97%   Weight: 38.6 kg (85 lb)   Height: 5' 7\" (1.702 m)            Physical Exam   Constitutional: No distress.    Frail, cachetic, contracted male in NAD   HENT:   Head: Atraumatic. Nose: Nose normal.   Mouth/Throat: No oropharyngeal exudate. Eyes: Conjunctivae and EOM are normal. Right eye exhibits no discharge. Left eye exhibits no discharge. No scleral icterus. Neck: Normal range of motion. Neck supple. No JVD present. No tracheal deviation present. No thyromegaly present. Cardiovascular: Normal rate and regular rhythm. Exam reveals no gallop and no friction rub. No murmur heard. Pulmonary/Chest: Breath sounds normal. No stridor. No respiratory distress. He has no wheezes. He has no rales. He exhibits no tenderness. Abdominal: Soft. Bowel sounds are normal. He exhibits no distension and no mass. There is no tenderness. There is no rebound and no guarding. Genitourinary:         Musculoskeletal: Normal range of motion. He exhibits no edema or tenderness. Lymphadenopathy:     He has no cervical adenopathy. Neurological: He is alert. Skin: Skin is warm and dry. He is not diaphoretic. Psychiatric: He has a normal mood and affect. His behavior is normal.   Nursing note and vitals reviewed. MDM  Number of Diagnoses or Management Options  Risk of Complications, Morbidity, and/or Mortality  General comments:    - stable pt in NAD    Patient Progress  Patient progress: stable    ED Course       Procedures      11:31 AM  Pt has been reevaluated. There are no new complaints, changes, or physical findings at this time. Medications have been reviewed w/ pt and/or family. Pt and/or family's questions have been answered. Pt and/or family expressed good understanding of the dx/tx/rx and is in agreement with plan of care. Pt instructed and agreed to f/u w/ PCP and to return to ED upon further deterioration. Pt is ready for discharge. LABORATORY TESTS:  No results found for this or any previous visit (from the past 12 hour(s)). IMAGING RESULTS:  No orders to display     No results found.       MEDICATIONS GIVEN:  Medications - No data to display    IMPRESSION:  1. Candidal dermatitis        PLAN:  1. Current Discharge Medication List      START taking these medications    Details   nystatin-triamcinolone (MYCOLOG) 100,000-0.1 unit/gram-% ointment Apply  to affected area two (2) times a day. Qty: 30 g, Refills: 0         STOP taking these medications       triamcinolone acetonide (KENALOG) 0.1 % topical cream Comments:   Reason for Stoppin.   Follow-up Information     Follow up With Details Comments [address] [place] [address] MD [address] In 2 days  [address] [facility] [place] [address]  [phone]      [facility] EMERGENCY DEPT  As needed, If symptoms worsen [address] [facility]  [phone]        3.  Supportive care     Return to ED if worse         Armond Nunez NP  11:31 AM

## 2017-08-05 NOTE — DISCHARGE INSTRUCTIONS
Yeast Skin Infection: Care Instructions  Your Care Instructions    Yeast normally lives on your skin. Sometimes too much yeast can overgrow in certain areas of the skin and cause an infection. The infection causes red, scaly, moist patches on your skin that may itch. Common areas for skin yeast infections are skin folds under the breasts or belly area. The warm and moist areas in the skin folds can make it easier for yeast to overgrow. Yeast infections also can be found on other parts of the body such as the groin or armpits. You will probably get a cream or ointment that contains an antifungal medicine. Examples of these are miconazole and clotrimazole. You put it on your skin to treat the infection. Your doctor may give you a prescription for the cream or ointment. Or you may be able to buy it without a prescription at most drugstores. If the infection is severe, the doctor will prescribe antifungal pills. A yeast infection usually goes away after about a week of treatment. But it's important to use the medicine for as long as your doctor tells you to. Follow-up care is a key part of your treatment and safety. Be sure to make and go to all appointments, and call your doctor if you are having problems. It's also a good idea to know your test results and keep a list of the medicines you take. How can you care for yourself at home? · Be safe with medicines. Take your medicines exactly as prescribed. Call your doctor if you think you are having a problem with your medicine. · Keep your skin clean and dry. Your doctor may suggest using powder that contains an antifungal medicine in the skin folds. · Wear loose clothing. When should you call for help? Call your doctor now or seek immediate medical care if:  · You have symptoms of infection, such as:  ¨ Increased pain, swelling, warmth, or redness. ¨ Red streaks leading from the area. ¨ Pus draining from the area. ¨ A fever.   Watch closely for changes in your health, and be sure to contact your doctor if:  · You do not get better as expected. Where can you learn more? Go to http://maria a-palmer.info/. Enter H325 in the search box to learn more about \"Yeast Skin Infection: Care Instructions. \"  Current as of: November 3, 2016  Content Version: 11.3  © 3682-9756 [employer]. Care instructions adapted under license by [employer] (which disclaims liability or warranty for this information). If you have questions about a medical condition or this instruction, always ask your healthcare professional. [place] [address] any warranty or liability for your use of this information. We hope that we have addressed all of your medical concerns. The examination and treatment you received in the Emergency Department were for an emergent problem and were not intended as complete care. It is important that you follow up with your healthcare provider(s) for ongoing care. If your symptoms worsen or do not improve as expected, and you are unable to reach your usual health care provider(s), you should return to the Emergency Department. Today's healthcare is undergoing tremendous change, and patient satisfaction surveys are one of the many tools to assess the quality of medical care. You may receive a survey from the [employer] regarding your experience in the Emergency Department. I hope that your experience has been completely positive, particularly the medical care that I provided. As such, please participate in the survey; anything less than excellent does not meet my expectations or intentions. [facility] and [address] [place] [address] participate in nationally recognized quality of care measures.   If your blood pressure is greater than 120/80, as reported below, we urge that you seek medical care to address the potential of high blood pressure, commonly known as hypertension. Hypertension can be hereditary or can be caused by certain medical conditions, pain, stress, or \"white coat syndrome. \"       Please make an appointment with your health care provider(s) for follow up of your Emergency Department visit. VITALS:   Patient Vitals for the past 8 hrs:   Temp Pulse Resp BP SpO2   08/05/17 1020 97.7 °F (36.5 °C) 87 14 108/72 97 %          Thank you for allowing us to provide you with medical care today. We realize that you have many choices for your emergency care needs. Please choose us in the future for any continued health care needs. Richy Chambers, [address] [facility] [address]: [phone]            No results found for this or any previous visit (from the past 24 hour(s)). No results found.

## 2017-08-05 NOTE — LETTER
NOTIFICATION OF MODIFIED CARE NEEDS 
 
8/5/2017 11:50 AM 
 
Mr. J Luis Mehta [facility] [address] 
[address] [facility] [address] To Whom It May Concern: 
 
J Luis Mehta is currently under the care of [facility] EMERGENCY DEPT. Please apply Mycolog cream to rash on buttocks at least twice daily with diaper changes. Apply moisture barrier cream with each diaper change. Keep buttocks as dry as possible. If there are questions or concerns please have the patient contact our office. Sincerely, No name on file.

## 2017-08-05 NOTE — ED TRIAGE NOTES
Rectal bleeding for one week, noted each time he is wiped/cleaned. Mother describes as bright red streaks of blood when wiping. She noticed that he started out with some redness around rectum. Has applied Desitin to the irritated area.

## 2018-06-23 ENCOUNTER — HOSPITAL ENCOUNTER (INPATIENT)
Age: 53
LOS: 4 days | Discharge: HOME | DRG: 871 | End: 2018-06-27
Attending: EMERGENCY MEDICINE | Admitting: INTERNAL MEDICINE
Payer: MEDICARE

## 2018-06-23 ENCOUNTER — APPOINTMENT (OUTPATIENT)
Dept: GENERAL RADIOLOGY | Age: 53
DRG: 871 | End: 2018-06-23
Attending: EMERGENCY MEDICINE
Payer: MEDICARE

## 2018-06-23 DIAGNOSIS — N39.0 URINARY TRACT INFECTION WITHOUT HEMATURIA, SITE UNSPECIFIED: Primary | ICD-10-CM

## 2018-06-23 DIAGNOSIS — I95.9 HYPOTENSION, UNSPECIFIED HYPOTENSION TYPE: ICD-10-CM

## 2018-06-23 PROBLEM — G40.909 SEIZURE DISORDER (HCC): Status: ACTIVE | Noted: 2017-07-08

## 2018-06-23 LAB
ALBUMIN SERPL-MCNC: 3.6 G/DL (ref 3.5–5)
ALBUMIN/GLOB SERPL: 0.8 {RATIO} (ref 1.1–2.2)
ALP SERPL-CCNC: 139 U/L (ref 45–117)
ALT SERPL-CCNC: 14 U/L (ref 12–78)
ANION GAP SERPL CALC-SCNC: 7 MMOL/L (ref 5–15)
APPEARANCE UR: ABNORMAL
AST SERPL-CCNC: 11 U/L (ref 15–37)
BACTERIA URNS QL MICRO: ABNORMAL /HPF
BASOPHILS # BLD: 0 K/UL (ref 0–0.1)
BASOPHILS NFR BLD: 0 % (ref 0–1)
BILIRUB SERPL-MCNC: 0.3 MG/DL (ref 0.2–1)
BILIRUB UR QL: NEGATIVE
BUN SERPL-MCNC: 22 MG/DL (ref 6–20)
BUN/CREAT SERPL: 40 (ref 12–20)
CALCIUM SERPL-MCNC: 9.1 MG/DL (ref 8.5–10.1)
CHLORIDE SERPL-SCNC: 98 MMOL/L (ref 97–108)
CO2 SERPL-SCNC: 29 MMOL/L (ref 21–32)
COLOR UR: ABNORMAL
CREAT SERPL-MCNC: 0.55 MG/DL (ref 0.7–1.3)
DIFFERENTIAL METHOD BLD: ABNORMAL
EOSINOPHIL # BLD: 0 K/UL (ref 0–0.4)
EOSINOPHIL NFR BLD: 0 % (ref 0–7)
EPITH CASTS URNS QL MICRO: ABNORMAL /LPF
ERYTHROCYTE [DISTWIDTH] IN BLOOD BY AUTOMATED COUNT: 13.4 % (ref 11.5–14.5)
GLOBULIN SER CALC-MCNC: 4.6 G/DL (ref 2–4)
GLUCOSE SERPL-MCNC: 99 MG/DL (ref 65–100)
GLUCOSE UR STRIP.AUTO-MCNC: NEGATIVE MG/DL
HCT VFR BLD AUTO: 38 % (ref 36.6–50.3)
HGB BLD-MCNC: 12.5 G/DL (ref 12.1–17)
HGB UR QL STRIP: NEGATIVE
IMM GRANULOCYTES # BLD: 0 K/UL (ref 0–0.04)
IMM GRANULOCYTES NFR BLD AUTO: 0 % (ref 0–0.5)
KETONES UR QL STRIP.AUTO: NEGATIVE MG/DL
LACTATE SERPL-SCNC: 1.6 MMOL/L (ref 0.4–2)
LEUKOCYTE ESTERASE UR QL STRIP.AUTO: ABNORMAL
LYMPHOCYTES # BLD: 0.4 K/UL (ref 0.8–3.5)
LYMPHOCYTES NFR BLD: 2 % (ref 12–49)
MCH RBC QN AUTO: 33.2 PG (ref 26–34)
MCHC RBC AUTO-ENTMCNC: 32.9 G/DL (ref 30–36.5)
MCV RBC AUTO: 100.8 FL (ref 80–99)
MONOCYTES # BLD: 2 K/UL (ref 0–1)
MONOCYTES NFR BLD: 10 % (ref 5–13)
NEUTS BAND NFR BLD MANUAL: 3 %
NEUTS SEG # BLD: 17.4 K/UL (ref 1.8–8)
NEUTS SEG NFR BLD: 85 % (ref 32–75)
NITRITE UR QL STRIP.AUTO: NEGATIVE
NRBC # BLD: 0 K/UL (ref 0–0.01)
NRBC BLD-RTO: 0 PER 100 WBC
PH UR STRIP: 8 [PH] (ref 5–8)
PLATELET # BLD AUTO: 181 K/UL (ref 150–400)
PMV BLD AUTO: 10.3 FL (ref 8.9–12.9)
POTASSIUM SERPL-SCNC: 4.1 MMOL/L (ref 3.5–5.1)
PROT SERPL-MCNC: 8.2 G/DL (ref 6.4–8.2)
PROT UR STRIP-MCNC: ABNORMAL MG/DL
RBC # BLD AUTO: 3.77 M/UL (ref 4.1–5.7)
RBC #/AREA URNS HPF: ABNORMAL /HPF (ref 0–5)
RBC MORPH BLD: ABNORMAL
SODIUM SERPL-SCNC: 134 MMOL/L (ref 136–145)
SP GR UR REFRACTOMETRY: 1.02 (ref 1–1.03)
UR CULT HOLD, URHOLD: NORMAL
UROBILINOGEN UR QL STRIP.AUTO: 1 EU/DL (ref 0.2–1)
WBC # BLD AUTO: 19.8 K/UL (ref 4.1–11.1)
WBC URNS QL MICRO: >100 /HPF (ref 0–4)

## 2018-06-23 PROCEDURE — 80053 COMPREHEN METABOLIC PANEL: CPT | Performed by: EMERGENCY MEDICINE

## 2018-06-23 PROCEDURE — 51701 INSERT BLADDER CATHETER: CPT

## 2018-06-23 PROCEDURE — 74011000258 HC RX REV CODE- 258: Performed by: EMERGENCY MEDICINE

## 2018-06-23 PROCEDURE — 99285 EMERGENCY DEPT VISIT HI MDM: CPT

## 2018-06-23 PROCEDURE — 96360 HYDRATION IV INFUSION INIT: CPT

## 2018-06-23 PROCEDURE — 77030011943

## 2018-06-23 PROCEDURE — 93005 ELECTROCARDIOGRAM TRACING: CPT

## 2018-06-23 PROCEDURE — 85025 COMPLETE CBC W/AUTO DIFF WBC: CPT | Performed by: EMERGENCY MEDICINE

## 2018-06-23 PROCEDURE — 83605 ASSAY OF LACTIC ACID: CPT | Performed by: EMERGENCY MEDICINE

## 2018-06-23 PROCEDURE — 87086 URINE CULTURE/COLONY COUNT: CPT | Performed by: EMERGENCY MEDICINE

## 2018-06-23 PROCEDURE — 74011250637 HC RX REV CODE- 250/637: Performed by: INTERNAL MEDICINE

## 2018-06-23 PROCEDURE — 87040 BLOOD CULTURE FOR BACTERIA: CPT | Performed by: EMERGENCY MEDICINE

## 2018-06-23 PROCEDURE — 74011250636 HC RX REV CODE- 250/636: Performed by: INTERNAL MEDICINE

## 2018-06-23 PROCEDURE — 87077 CULTURE AEROBIC IDENTIFY: CPT | Performed by: EMERGENCY MEDICINE

## 2018-06-23 PROCEDURE — 36415 COLL VENOUS BLD VENIPUNCTURE: CPT | Performed by: EMERGENCY MEDICINE

## 2018-06-23 PROCEDURE — 81001 URINALYSIS AUTO W/SCOPE: CPT | Performed by: EMERGENCY MEDICINE

## 2018-06-23 PROCEDURE — 74011000258 HC RX REV CODE- 258: Performed by: INTERNAL MEDICINE

## 2018-06-23 PROCEDURE — 74011250636 HC RX REV CODE- 250/636: Performed by: EMERGENCY MEDICINE

## 2018-06-23 PROCEDURE — 71045 X-RAY EXAM CHEST 1 VIEW: CPT

## 2018-06-23 PROCEDURE — 87186 SC STD MICRODIL/AGAR DIL: CPT | Performed by: EMERGENCY MEDICINE

## 2018-06-23 PROCEDURE — 65660000000 HC RM CCU STEPDOWN

## 2018-06-23 RX ORDER — SODIUM CHLORIDE 9 MG/ML
100 INJECTION, SOLUTION INTRAVENOUS CONTINUOUS
Status: DISCONTINUED | OUTPATIENT
Start: 2018-06-23 | End: 2018-06-27 | Stop reason: HOSPADM

## 2018-06-23 RX ORDER — DOCUSATE SODIUM 100 MG/1
100 CAPSULE, LIQUID FILLED ORAL
Status: DISCONTINUED | OUTPATIENT
Start: 2018-06-23 | End: 2018-06-27 | Stop reason: HOSPADM

## 2018-06-23 RX ORDER — NALOXONE HYDROCHLORIDE 0.4 MG/ML
0.4 INJECTION, SOLUTION INTRAMUSCULAR; INTRAVENOUS; SUBCUTANEOUS AS NEEDED
Status: DISCONTINUED | OUTPATIENT
Start: 2018-06-23 | End: 2018-06-27 | Stop reason: HOSPADM

## 2018-06-23 RX ORDER — DIVALPROEX SODIUM 125 MG/1
125 TABLET, DELAYED RELEASE ORAL
Status: DISCONTINUED | OUTPATIENT
Start: 2018-06-23 | End: 2018-06-27 | Stop reason: HOSPADM

## 2018-06-23 RX ORDER — ASPIRIN 81 MG/1
81 TABLET ORAL
COMMUNITY

## 2018-06-23 RX ORDER — SODIUM CHLORIDE 0.9 % (FLUSH) 0.9 %
5-10 SYRINGE (ML) INJECTION EVERY 8 HOURS
Status: DISCONTINUED | OUTPATIENT
Start: 2018-06-23 | End: 2018-06-27 | Stop reason: HOSPADM

## 2018-06-23 RX ORDER — ENOXAPARIN SODIUM 100 MG/ML
30 INJECTION SUBCUTANEOUS EVERY 24 HOURS
Status: DISCONTINUED | OUTPATIENT
Start: 2018-06-23 | End: 2018-06-27 | Stop reason: HOSPADM

## 2018-06-23 RX ORDER — DIVALPROEX SODIUM 250 MG/1
500 TABLET, DELAYED RELEASE ORAL 2 TIMES DAILY WITH MEALS
Status: DISCONTINUED | OUTPATIENT
Start: 2018-06-23 | End: 2018-06-27 | Stop reason: HOSPADM

## 2018-06-23 RX ORDER — SODIUM CHLORIDE 0.9 % (FLUSH) 0.9 %
5-10 SYRINGE (ML) INJECTION AS NEEDED
Status: DISCONTINUED | OUTPATIENT
Start: 2018-06-23 | End: 2018-06-27 | Stop reason: HOSPADM

## 2018-06-23 RX ORDER — ACETAMINOPHEN 325 MG/1
650 TABLET ORAL
Status: DISCONTINUED | OUTPATIENT
Start: 2018-06-23 | End: 2018-06-27 | Stop reason: HOSPADM

## 2018-06-23 RX ORDER — ASPIRIN 81 MG/1
81 TABLET ORAL
Status: DISCONTINUED | OUTPATIENT
Start: 2018-06-23 | End: 2018-06-27 | Stop reason: HOSPADM

## 2018-06-23 RX ORDER — PANTOPRAZOLE SODIUM 40 MG/1
40 TABLET, DELAYED RELEASE ORAL
Status: DISCONTINUED | OUTPATIENT
Start: 2018-06-23 | End: 2018-06-27 | Stop reason: HOSPADM

## 2018-06-23 RX ORDER — PHENOBARBITAL 32.4 MG/1
32.4 TABLET ORAL 2 TIMES DAILY
Status: DISCONTINUED | OUTPATIENT
Start: 2018-06-23 | End: 2018-06-27 | Stop reason: HOSPADM

## 2018-06-23 RX ORDER — ENOXAPARIN SODIUM 100 MG/ML
40 INJECTION SUBCUTANEOUS EVERY 24 HOURS
Status: DISCONTINUED | OUTPATIENT
Start: 2018-06-23 | End: 2018-06-23 | Stop reason: DRUGHIGH

## 2018-06-23 RX ORDER — ASCORBIC ACID 500 MG
1000 TABLET ORAL DAILY
Status: DISCONTINUED | OUTPATIENT
Start: 2018-06-24 | End: 2018-06-27 | Stop reason: HOSPADM

## 2018-06-23 RX ADMIN — SODIUM CHLORIDE 74 ML: 900 INJECTION, SOLUTION INTRAVENOUS at 15:19

## 2018-06-23 RX ADMIN — SODIUM CHLORIDE 100 ML/HR: 900 INJECTION, SOLUTION INTRAVENOUS at 15:29

## 2018-06-23 RX ADMIN — Medication 10 ML: at 15:29

## 2018-06-23 RX ADMIN — ACETAMINOPHEN 650 MG: 325 TABLET ORAL at 20:56

## 2018-06-23 RX ADMIN — DIVALPROEX SODIUM 500 MG: 250 TABLET, DELAYED RELEASE ORAL at 17:23

## 2018-06-23 RX ADMIN — PIPERACILLIN SODIUM AND TAZOBACTAM SODIUM 3.38 G: 3; .375 INJECTION, POWDER, LYOPHILIZED, FOR SOLUTION INTRAVENOUS at 20:49

## 2018-06-23 RX ADMIN — ASPIRIN 81 MG: 81 TABLET, COATED ORAL at 17:23

## 2018-06-23 RX ADMIN — PHENOBARBITAL 32.4 MG: 32.4 TABLET ORAL at 17:23

## 2018-06-23 RX ADMIN — DIVALPROEX SODIUM 125 MG: 250 TABLET, DELAYED RELEASE ORAL at 17:36

## 2018-06-23 RX ADMIN — SODIUM CHLORIDE 1000 ML: 900 INJECTION, SOLUTION INTRAVENOUS at 13:25

## 2018-06-23 RX ADMIN — ENOXAPARIN SODIUM 30 MG: 30 INJECTION SUBCUTANEOUS at 17:23

## 2018-06-23 RX ADMIN — PANTOPRAZOLE SODIUM 40 MG: 40 TABLET, DELAYED RELEASE ORAL at 17:22

## 2018-06-23 RX ADMIN — PIPERACILLIN SODIUM AND TAZOBACTAM SODIUM 3.38 G: 3; .375 INJECTION, POWDER, LYOPHILIZED, FOR SOLUTION INTRAVENOUS at 14:29

## 2018-06-23 NOTE — PROGRESS NOTES
[facility]: MED RECONCILIATION    Comments/Recommendations:    Medication bottles were brought in for review during medication reconciliation.  Patient has not had any medications today. All schedule medication were administered yesterday.  Patient's mother states that he needs to take his medication with applesauce. Medication(s) ADDED to PTA list:  1. ASA 81 mg daily    Medication(s) REMOVED from PTA list:  1. Divalproex  mg QPM    Medication(s) ADJUSTED on PTA list:  1. None    Information obtained from: Rx bottles/ mother      Allergies: Other medication    Prior to Admission Medications:     Prior to Admission Medications   Prescriptions Last Dose Informant Patient Reported? Taking? DOCUSATE CALCIUM (STOOL SOFTENER PO)  Parent Yes Yes   Sig: Take 1 tablet by mouth two (2) times daily as needed. PHENobarbital (LUMINAL) 30 mg tablet 6/22/2018 at Unknown time Parent Yes Yes   Sig: Take 30 mg by mouth two (2) times a day. acetaminophen (TYLENOL) 500 mg tablet  Parent Yes Yes   Sig: Take 1,000 mg by mouth every six (6) hours as needed for Pain. ascorbic acid, vitamin C, powd 6/22/2018 at Unknown time Parent Yes Yes   Sig: Take 1,000 mg by mouth daily. Mix with liquid prior to administration. aspirin delayed-release 81 mg tablet 6/22/2018 at Unknown time  Yes Yes   Sig: Take 81 mg by mouth daily (with dinner). divalproex DR (DEPAKOTE) 125 mg tablet 6/22/2018 at Unknown time Parent Yes Yes   Sig: Take 125 mg by mouth three (3) times daily. divalproex DR (DEPAKOTE) 500 mg tablet 6/22/2018 at Unknown time Parent No Yes   Sig: Take 1 Tab by mouth two (2) times a day. omeprazole (PRILOSEC) 20 mg capsule 6/22/2018 at Unknown time Parent Yes Yes   Sig: Take 20 mg by mouth Before breakfast and dinner.       Facility-Administered Medications: None              ZEFERINO CabelloD   Contact: [phone]

## 2018-06-23 NOTE — H&P
[address]  [phone]    Hospitalist Admission Note      NAME:  Hong Moya   :   1965   MRN:  [id number]     PCP:  Logan Mane MD     Date/Time:  2018 2:52 PM          Subjective:     CHIEF COMPLAINT: dyspnea, lethargy    HISTORY OF PRESENT ILLNESS:     Mr. Onel Posadas is a 47 y.o. male w/ hx of cerebral palsy, chronic dysphagia, seizure d/o who presents with dyspnea and lethargy. Started this morning. Constant, moderate, worsening, associated with rectal temp of 101.5. Some coughing. Pt nonverbal at baseline so history is limited to chart review and pt's mother at bedside. ED workup showed temp 99.8, WBC 19.8K. CXR showed no acute process. UA showed severe pyuria with 3+ bacteriuria. Mr. Onel Posadas is admitted for further evaluation and management of sepsis due to UTI. Past Medical History:   Diagnosis Date    Aspiration pneumonia ([address]     Cerebral palsy (HCC)     Ill-defined condition     dsyphasia    Scoliosis     Seizure disorder (HCC)     Seizures (HCC)         Past Surgical History:   Procedure Laterality Date    HX HEENT         Social History   Substance Use Topics    Smoking status: Never Smoker    Smokeless tobacco: Never Used    Alcohol use No        Family History   Problem Relation Age of Onset    Cancer Father      prostate    Cancer Maternal Aunt     Heart Disease Maternal Aunt     Cancer Maternal Grandmother     Cancer Maternal Grandfather     Cancer Paternal Grandmother     Cancer Paternal Grandfather     Cancer Other           Allergies   Allergen Reactions    Other Medication Swelling     Mother states the patient had a reaction to an antibiotic as a child  She cannot recall the name of the antibiotic nor the indication   Per [facility] [phone] the patient has had prescriptions for Augmentin  Tolerates Cefdinir.         Prior to Admission medications    Medication Sig Start Date End Date Taking? Authorizing Provider   aspirin delayed-release 81 mg tablet Take 81 mg by mouth daily (with dinner). Yes Historical Provider   ascorbic acid, vitamin C, powd Take 1,000 mg by mouth daily. Mix with liquid prior to administration. Yes Historical Provider   omeprazole (PRILOSEC) 20 mg capsule Take 20 mg by mouth Before breakfast and dinner. Yes Historical Provider   PHENobarbital (LUMINAL) 30 mg tablet Take 30 mg by mouth two (2) times a day. Yes Historical Provider   divalproex DR (DEPAKOTE) 125 mg tablet Take 125 mg by mouth three (3) times daily. Yes Historical Provider   acetaminophen (TYLENOL) 500 mg tablet Take 1,000 mg by mouth every six (6) hours as needed for Pain. Yes Historical Provider   DOCUSATE CALCIUM (STOOL SOFTENER PO) Take 1 tablet by mouth two (2) times daily as needed. Yes Historical Provider   divalproex DR (DEPAKOTE) 500 mg tablet Take 1 Tab by mouth two (2) times a day. 4/23/14  Yes Jade Marlow, NP       Review of Systems:\  Limited due to baseline mental status  (bold if positive, if negative)    Gen:  feverfatigueEyes:  ENT:  CVS:  Pulm:  Cough, dyspnea,GI:    :    MS:  Skin:  Psych:  Endo:    Hem:  Renal:    Neuro:            Objective:      VITALS:    Vital signs reviewed; most recent are:    Visit Vitals    BP 92/68    Pulse 94    Temp 99.8 °F (37.7 °C)    Resp 17    Ht 5' 6\" (1.676 m)    Wt 35.8 kg (79 lb)    SpO2 100%    BMI 12.75 kg/m2     SpO2 Readings from Last 6 Encounters:   06/23/18 100%   08/05/17 97%   07/16/17 95%   03/10/16 100%   03/06/16 98%   09/06/14 99%        No intake or output data in the 24 hours ending 06/23/18 1452         Exam:     Physical Exam:    Gen:  Frail, thin, ill-appearing  HEENT:  No scleral icterus, hearing intact to voice, dry mucous membranes  Neck:  Supple, without masses. Resp:  No accessory muscle use. Increased WOB. CTAB without wheezing, rales, rhonchi  Card: tachycardic.  Normal S1 and S2 without murmurs, rubs, or gallops. No peripheral lower extremity edema. No JVD. Peripheral pulses in tact. Abd:  Normoactive bowel sounds. Soft, non-tender, non-distended. No rebound, no guarding. Musc:  No cyanosis or clubbing  Skin:  No rashes or ulcers; turgor intact. Cap refill ~2 secs  Neuro:  Cranial nerves are grossly intact, no focal motor weakness moving all extremities. Does not follow commands appropriately. Nonverbal at baseline  Psych:  Poor insight, normal affect. Awake, alert, oriented to self. Does not answer questions appropriately       Labs:    Recent Labs      06/23/18   1300   WBC  19.8*   HGB  12.5   HCT  38.0   PLT  181     Recent Labs      06/23/18   1300   NA  134*   K  4.1   CL  98   CO2  29   GLU  99   BUN  22*   CREA  0.55*   CA  9.1   ALB  3.6   SGOT  11*   ALT  14     No components found for: GLPOC  No results for input(s): PH, PCO2, PO2, HCO3, FIO2 in the last 72 hours. No results for input(s): INR in the last 72 hours. No lab exists for component: INREXT  Lab Results   Component Value Date/Time    Specimen Description: NARES 06/15/2012 06:15 PM    Specimen Description: URINE 06/15/2012 11:45 AM    Specimen Description: BLOOD 06/15/2012 07:20 AM     Lab Results   Component Value Date/Time    Culture result: FEW  ENTEROBACTER AEROGENES   (A) 07/08/2017 08:56 PM    Culture result: LIGHT  NORMAL RESPIRATORY RAGHAV   07/08/2017 08:56 PM    Culture result: NO GROWTH 6 DAYS 07/08/2017 03:08 PM     All other current labs reviewed in the computer. Imaging/Studies:    CXR: no acute process, clear lungs  Imaging personally reviewed    EKG: sinus tach, no ST-T abn  EKG personally reviewed         Assessment / Plan:        Sepsis: due to UTI. Given dyspnea and cough, may have early aspiration PNA/pneumonitis as well. Empirically cover with Zosyn for now, de-escalate once micro returns. Monitor urine and blood cultures      Hypotension: due to sepsis. Lactate WNR.  No evidence of end-organ damage or severe sepsis. Expect this to improve with IVF and IV abx as with last admission. If not, then broaden abx and add vasopressors. UTI (urinary tract infection): as above. Follow urine culture      Seizure disorder [facility]) (): mentation improving per mother at bedside. Defer checking AED levels and continue home AEDs at current dose      Cerebral palsy [facility]): chronic dysphagia and aspiration risk. On dysphagia pureed diet at home per pt's mother. Aspiration precautions. Underweight: BMI 12.75, but largely unchanged. Likely where he is usually is due to CP.  Nutritionist consult      Code Status: DNR, d/w pt's mother, who is her POA     Surrogate decision maker: mother      Previous notes and lab results reviewed, including prior admission H&P and DC summary      Total time spent with patient: 79 Minutes     Risk of deterioration: High                 Care Plan discussed with: ED provider, Patient, Family, Nursing Staff and >50% of time spent in counseling and coordination of care    Discussed:  Code Status, Care Plan and D/C Planning    Prophylaxis:  Lovenox    Disposition:  Home w/Family       ___________________________________________________    Attending Physician: Lily Weston MD

## 2018-06-23 NOTE — PROGRESS NOTES
Gave verbal sign-out over the phone at 3:11 PM to Dr. Soundra Crigler whose practice has accepted care of this patient.       Radha Valdovinos MD  6/23/2018

## 2018-06-23 NOTE — PROGRESS NOTES
SHIFT REPORT:  1900- Bedside shift change report given to Harshad Alcaraz RN (oncoming nurse) by Joseph Lewis RN (offgoing nurse). Report included the following information SBAR, Kardex, Procedure Summary, Intake/Output, Recent Results and Cardiac Rhythm. SHIFT SUMMARY:  2055-Tylenol accepted in applesauce as pt MOM says it helps him rest better  0245-venous blood drawn; repositioned and briefs changed for incont urine  0645-awakened for med in applesauce; ruiz well. END OF SHIFT REPORT:  0700-Bedside shift change report given to Eligio Martini., RN (oncoming nurse) by Harshad Alcaraz RN (offgoing nurse). Report included the following information SBAR, Kardex, Procedure Summary, Intake/Output, Recent Results and Cardiac Rhythm .

## 2018-06-23 NOTE — ROUTINE PROCESS
Bedside and Verbal shift change report given to Pru (oncoming nurse) by Corona Abbott RN (offgoing nurse). Report included the following information SBAR, Kardex, Intake/Output, MAR and Recent Results.

## 2018-06-23 NOTE — PROGRESS NOTES
[facility] Dosing Services: 6/23/2018    Enoxaparin by Dr. Chester Saba made for this 47 y.o. male, for prophylaxis. Wt Readings from Last 1 Encounters:   06/23/18 35.8 kg (79 lb)       Ht Readings from Last 1 Encounters:   06/23/18 167.6 cm (66\")      Previous Dose 40 mg every 24 hours   Creatinine Clearance Estimated Creatinine Clearance: 79.6 mL/min (based on Cr of 0.55). Creatinine Lab Results   Component Value Date/Time    Creatinine 0.55 (L) 06/23/2018 01:00 PM    Creatinine (POC) 0.4 (L) 08/02/2012 08:18 PM       Platelet Lab Results   Component Value Date/Time    PLATELET 388 84/04/2863 01:00 PM      H/H Lab Results   Component Value Date/Time    HGB 12.5 06/23/2018 01:00 PM        Pharmacist made change to enoxaparin therapy based on:  [ X ] Low Body Weight: dose changed to: 30 mg every 24 hours per protocol  Male 35-57 kg      Pharmacy to monitor patients progress. Will make dose adjustment as needed per changing renal function. Will communicate further recommendations regarding patients anticoagulation therapy with prescriber. Signed Chantell Hernandez .  Contact information: [phone]

## 2018-06-23 NOTE — IP AVS SNAPSHOT
[address] 
[phone] Patient: Tania Chapin MRN: [id number] :1965 About your hospitalization You were admitted on:  2018 You last received care in the:  [facility] 5M1 MED SURG 1 You were discharged on:  2018 Why you were hospitalized Your primary diagnosis was:  Not on File Your diagnoses also included:  Cerebral Palsy (Hcc), Seizure Disorder (Hcc), Hypotension, Sepsis (Hcc), Uti (Urinary Tract Infection) Follow-up Information Follow up With Details Comments Contact [facility] HEALTH  THEY WILL CONTACT YOU TO SCHEDULE A HOME VISIT DONNY [address] [facility] [address] 
[phone] Brendon Souza NP Go on 2018 Hospital PCP f/u appointment on  @ 9:15 a.m. [address] [place] [address]. [address] [place] [address] 
[phone] Discharge Orders None A check vida indicates which time of day the medication should be taken. My Medications START taking these medications Instructions Each Dose to Equal  
 Morning Noon Evening Bedtime  
 levoFLOXacin 750 mg tablet Commonly known as:  Eleuterio Manda Your last dose was:  18 at 9 am  
   
 Take 1 Tab by mouth daily. 750 mg  
    
   
   
   
  
 mupirocin 2 % ointment Commonly known as:  [facility] Your last dose was:  18 at 9 am  
   
 Apply  to affected area two (2) times a day. Apply to ear CONTINUE taking these medications Instructions Each Dose to Equal  
 Morning Noon Evening Bedtime  
 acetaminophen 500 mg tablet Commonly known as:  TYLENOL Take 1,000 mg by mouth every six (6) hours as needed for Pain. 1000 mg  
    
   
   
   
  
 ascorbic acid (vitamin C) Powd Take 1,000 mg by mouth daily. Mix with liquid prior to administration.   
 1000 mg  
    
   
   
   
  
 aspirin delayed-release 81 mg tablet Your last dose was:  18 at 5 pm  
   
 Take 81 mg by mouth daily (with dinner). 81 mg  
    
   
   
   
  
 * divalproex  mg tablet Commonly known as:  DEPAKOTE Your last dose was:  18 at 9 am  
   
 Take 125 mg by mouth three (3) times daily. 125 mg  
    
   
   
   
  
 * divalproex  mg tablet Commonly known as:  DEPAKOTE Your last dose was:  18 at 9 am  
   
 Take 1 Tab by mouth two (2) times a day. 500 mg  
    
   
   
   
  
 omeprazole 20 mg capsule Commonly known as:  PRILOSEC Take 20 mg by mouth Before breakfast and dinner. 20 mg PHENobarbital 30 mg tablet Commonly known as:  LUMINAL Your last dose was:  18 at 9 am  
   
 Take 30 mg by mouth two (2) times a day. 30 mg STOOL SOFTENER PO Take 1 tablet by mouth two (2) times daily as needed. 1 Tab * Notice: This list has 2 medication(s) that are the same as other medications prescribed for you. Read the directions carefully, and ask your doctor or other care provider to review them with you. Where to Get Your Medications These medications were sent to [facility] [address], [address] [facility]  [address] [facility] [address] [facility] Phone:  [phone]  
  levoFLOXacin 750 mg tablet  
 mupirocin 2 % ointment Discharge Instructions Patient Discharge Instructions [facility] / [id number] : 1965 Admitted 2018 Discharged: 2018 7:14 AM  
 
ACUTE DIAGNOSES: 
Sepsis ([facility] CHRONIC MEDICAL DIAGNOSES: 
Problem List as of 2018  Date Reviewed: 2018 Codes Class Noted - Resolved Sepsis ([facility] ICD-10-CM: A41.9 ICD-9-CM: 038.9, 995.91  2017 - Present Aspiration pneumonia ([facility].) ICD-10-CM: J69.0 ICD-9-CM: 507.0  2017 - Present Hypotension ICD-10-CM: I95.9 ICD-9-CM: 458.9  7/8/2017 - Present Seizure disorder ([facility].) ICD-10-CM: G40.909 ICD-9-CM: 345.90  7/8/2017 - Present Fever ICD-10-CM: R50.9 ICD-9-CM: 780.60  8/31/2014 - Present SIRS (systemic inflammatory response syndrome) (HCC) ICD-10-CM: R65.10 ICD-9-CM: 995.90  8/31/2014 - Present Cough ICD-10-CM: R05 ICD-9-CM: 786.2  8/31/2014 - Present Sepsis(995.91) ICD-10-CM: A41.9 ICD-9-CM: 995.91  6/15/2012 - Present UTI (urinary tract infection) ICD-10-CM: N39.0 ICD-9-CM: 599.0  6/15/2012 - Present Overview Signed 6/17/2012  3:58 PM by Javier Madrigal Escherichia coli    >100K col/mL Phenobarbital toxicity ICD-10-CM: T42.3X1A 
ICD-9-CM: 967.0, E980.1  6/15/2012 - Present Cerebral palsy ([facility].) ICD-10-CM: G80.9 ICD-9-CM: 343.9  6/15/2012 - Present Scoliosis ICD-10-CM: M41.9 ICD-9-CM: 737.30  6/15/2012 - Present Dehydration ICD-10-CM: E86.0 ICD-9-CM: 276.51  6/15/2012 - Present Leukocytosis, unspecified ICD-10-CM: O31.971 ICD-9-CM: 288.60  6/15/2012 - Present Encephalopathy ICD-10-CM: G93.40 ICD-9-CM: 348.30  6/15/2012 - Present DISCHARGE MEDICATIONS:  
 
 
 
· It is important that you take the medication exactly as they are prescribed. · Keep your medication in the bottles provided by the pharmacist and keep a list of the medication names, dosages, and times to be taken in your wallet. · Do not take other medications without consulting your doctor. DIET:  Resume home diet ACTIVITY: Activity as tolerated ADDITIONAL INFORMATION: If you experience any of the following symptoms then please call your primary care physician or return to the emergency room if you cannot get hold of your doctor: Fever, chills, nausea, vomiting, diarrhea, change in mentation, falling, bleeding, shortness of breath.  
 
FOLLOW UP CARE: 
 Dr. Anna Handy MD  you are to call and set up an appointment to see them with in 1 week. Follow-up with specialists at directed by them Information obtained by : 
I understand that if any problems occur once I am at home I am to contact my physician. I understand and acknowledge receipt of the instructions indicated above. Physician's or R.N.'s Signature                                                                  Date/Time Patient or Representative Signature                                                          Date/Time LeukoDx Announcement We are excited to announce that we are making your provider's discharge notes available to you in LeukoDx. You will see these notes when they are completed and signed by the physician that discharged you from your recent hospital stay. If you have any questions or concerns about any information you see in LeukoDx, please call the Health Information Department where you were seen or reach out to your Primary Care Provider for more information about your plan of care. Introducing [facility]! Dalia Lyon introduces LeukoDx patient portal. Now you can access parts of your medical record, email your doctor's office, and request medication refills online. 1. In your internet browser, go to [email or web address] 2. Click on the First Time User? Click Here link in the Sign In box. You will see the New Member Sign Up page. 3. Enter your LeukoDx Access Code exactly as it appears below. You will not need to use this code after youve completed the sign-up process.  If you do not sign up before the expiration date, you must request a new code. 
 
· DNAtriX Access Code: [email or web address] Expires: 9/21/2018 12:50 PM 
 
4. Enter the last four digits of your Social Security Number (xxxx) and Date of Birth (mm/dd/yyyy) as indicated and click Submit. You will be taken to the next sign-up page. 5. Create a Mor[employer] Mediat ID. This will be your DNAtriX login ID and cannot be changed, so think of one that is secure and easy to remember. 6. Create a DNAtriX password. You can change your password at any time. 7. Enter your Password Reset Question and Answer. This can be used at a later time if you forget your password. 8. Enter your e-mail address. You will receive e-mail notification when new information is available in [address] 9. Click Sign Up. You can now view and download portions of your medical record. 10. Click the Download Summary menu link to download a portable copy of your medical information. If you have questions, please visit the Frequently Asked Questions section of the DNAtriX website. Remember, DNAtriX is NOT to be used for urgent needs. For medical emergencies, dial 911. Now available from your iPhone and Android! Introducing Kris Nguyen As a [facility] patient, I wanted to make you aware of our electronic visit tool called Kris Andrelance. Alex [facility] 24/7 allows you to connect within minutes with a medical provider 24 hours a day, seven days a week via a mobile device or tablet or logging into a secure website from your computer. You can access Kris Nguyen from anywhere in the [place]. A virtual visit might be right for you when you have a simple condition and feel like you just dont want to get out of bed, or cant get away from work for an appointment, when your regular [facility] provider is not available (evenings, weekends or holidays), or when youre out of town and need minor care.   Electronic visits cost only $49 and if the [employer] [facility] 24/7 provider determines a prescription is needed to treat your condition, one can be electronically transmitted to a nearby pharmacy*. Please take a moment to enroll today if you have not already done so. The enrollment process is free and takes just a few minutes. To enroll, please download the [employer] 24/7 leif to your tablet or phone, or visit [email or web address] to enroll on your computer. And, as an [address] [place] [address] patient with a [employer]Burst account, the results of your visits will be scanned into your electronic medical record and your primary care provider will be able to view the scanned results. We urge you to continue to see your regular [facility] provider for your ongoing medical care. And while your primary care provider may not be the one available when you seek a MedPassage virtual visit, the peace of mind you get from getting a real diagnosis real time can be priceless. For more information on MedPassage, view our Frequently Asked Questions (FAQs) at [email or web address]. Sincerely, 
 
Miri Sandoval MD 
Chief Medical Officer [facility] Radha Nick *:  certain medications cannot be prescribed via MedPassage Unresulted tests-please follow up with your PCP on these results Procedure/Test Authorizing Provider  CBC WITH AUTOMATED DIFF Dora Clark MD  
 CBC WITH AUTOMATED DIFF Carlen Schirmer, MD  
 CBC WITH AUTOMATED DIFF Magnus Carreon MD  
 CBC WITH AUTOMATED DIFF Danuta Hernandez MD  
 CBC WITH AUTOMATED DIFF Danuta Hernandez MD  
 CULTURE, BLOOD Magnus Jordyn Carreon MD  
 CULTURE, BLOOD Magnus Jordyn Carreon MD  
 CULTURE, URINE Magnus Jordyn Carreon MD  
 EKG, 12 LEAD, INITIAL Carlen Schirmer, MD  
 EKG, 12 LEAD, INITIAL Carlen Schirmer, MD  
 600 Alberto Brooke MD  
 LACTIC ACID Magnus Jordyn Carreon MD  
 MAGNESIUM Carlen Schirmer, MD  
 METABOLIC PANEL, Mario Juan MD  
 METABOLIC PANEL, Rubén Lema MD  
 METABOLIC PANEL, BASIC Nakita Aranda MD  
 METABOLIC PANEL, COMPREHENSIVE Jenae Talley MD  
 METABOLIC PANEL, COMPREHENSIVE Jad Norton MD  
 PHOSPHORUS Jenae Talley MD  
 SAMPLES BEING HELD MD Pancho Christiansen MD Pamala Began, MD Bedford Olives, MD  
 XR CHEST PORT Ale Fernandez MD  
 XR Lucille Camejo MD  
  
Providers Seen During Your Hospitalization Provider Specialty Primary office phone Jad Norton MD Emergency Medicine [phone] Jenae Talley MD Internal Medicine [phone] Nakita Aranda MD [facility] [phone] Your Primary Care Physician (PCP) Primary Care Physician Office Phone Office Fax Sarai, [address] [facility] [phone] You are allergic to the following Allergen Reactions Other Medication Swelling Mother states the patient had a reaction to an antibiotic as a child She cannot recall the name of the antibiotic nor the indication Per [facility] [phone] the patient has had prescriptions for Augmentin Tolerates Cefdinir. Recent Documentation Height Weight BMI Smoking Status 1.676 m 45.2 kg 16.08 kg/m2 Never Smoker Emergency Contacts Name Discharge Info Relation Home Work Mobile Shanti Tamez DISCHARGE CAREGIVER [3] Parent [1] [phone] Juanito Soto  Other Relative [6]   [phone] Eloy Soto  Other Relative [6]   [phone] Patient Belongings The following personal items are in your possession at time of discharge: 
  Dental Appliances: None         Home Medications: None   Jewelry: None  Clothing: None    Other Valuables: None Please provide this summary of care documentation to your next provider. Signatures-by signing, you are acknowledging that this After Visit Summary has been reviewed with you and you have received a copy. Patient Signature:  ____________________________________________________________ Date:  ____________________________________________________________  
  
Arna Bath Provider Signature:  ____________________________________________________________ Date:  ____________________________________________________________

## 2018-06-23 NOTE — IP AVS SNAPSHOT
[address] [facility] 
 
 
 [address] [facility] [address] [facility] 
[phone] Patient: Jenny Moncada MRN: [id number] :1965 A check vida indicates which time of day the medication should be taken. My Medications START taking these medications Instructions Each Dose to Equal  
 Morning Noon Evening Bedtime  
 levoFLOXacin 750 mg tablet Commonly known as:  Liborio Autumn Your last dose was:  18 at 9 am  
   
 Take 1 Tab by mouth daily. 750 mg  
    
   
   
   
  
 mupirocin 2 % ointment Commonly known as:  [facility] Your last dose was:  18 at 9 am  
   
 Apply  to affected area two (2) times a day. Apply to ear CONTINUE taking these medications Instructions Each Dose to Equal  
 Morning Noon Evening Bedtime  
 acetaminophen 500 mg tablet Commonly known as:  TYLENOL Take 1,000 mg by mouth every six (6) hours as needed for Pain. 1000 mg  
    
   
   
   
  
 ascorbic acid (vitamin C) Powd Take 1,000 mg by mouth daily. Mix with liquid prior to administration. 1000 mg  
    
   
   
   
  
 aspirin delayed-release 81 mg tablet Your last dose was:  18 at 5 pm  
   
 Take 81 mg by mouth daily (with dinner). 81 mg  
    
   
   
   
  
 * divalproex  mg tablet Commonly known as:  DEPAKOTE Your last dose was:  18 at 9 am  
   
 Take 125 mg by mouth three (3) times daily. 125 mg  
    
   
   
   
  
 * divalproex  mg tablet Commonly known as:  DEPAKOTE Your last dose was:  18 at 9 am  
   
 Take 1 Tab by mouth two (2) times a day. 500 mg  
    
   
   
   
  
 omeprazole 20 mg capsule Commonly known as:  PRILOSEC Take 20 mg by mouth Before breakfast and dinner. 20 mg PHENobarbital 30 mg tablet Commonly known as:  LUMINAL Your last dose was:  18 at 9 am  
   
 Take 30 mg by mouth two (2) times a day. 30 mg STOOL SOFTENER PO Take 1 tablet by mouth two (2) times daily as needed. 1 Tab * Notice: This list has 2 medication(s) that are the same as other medications prescribed for you. Read the directions carefully, and ask your doctor or other care provider to review them with you. Where to Get Your Medications These medications were sent to [facility], [address] [facility]  [address] [facility], [address] [facility] Phone:  [phone]  
  levoFLOXacin 750 mg tablet  
 mupirocin 2 % ointment

## 2018-06-23 NOTE — ED TRIAGE NOTES
Pt arrives via EMS. Mother states this morning patient started with SOB and difficulty arousing. Pt has Cerebral Palsy and unable to communicate to staff how he feels.

## 2018-06-24 LAB
ALBUMIN SERPL-MCNC: 2.5 G/DL (ref 3.5–5)
ALBUMIN/GLOB SERPL: 0.7 {RATIO} (ref 1.1–2.2)
ALP SERPL-CCNC: 102 U/L (ref 45–117)
ALT SERPL-CCNC: 8 U/L (ref 12–78)
ANION GAP SERPL CALC-SCNC: 8 MMOL/L (ref 5–15)
AST SERPL-CCNC: 8 U/L (ref 15–37)
ATRIAL RATE: 94 BPM
BASOPHILS # BLD: 0 K/UL (ref 0–0.1)
BASOPHILS NFR BLD: 0 % (ref 0–1)
BILIRUB SERPL-MCNC: 0.3 MG/DL (ref 0.2–1)
BUN SERPL-MCNC: 10 MG/DL (ref 6–20)
BUN/CREAT SERPL: 23 (ref 12–20)
CALCIUM SERPL-MCNC: 8.3 MG/DL (ref 8.5–10.1)
CALCULATED P AXIS, ECG09: 36 DEGREES
CALCULATED R AXIS, ECG10: 82 DEGREES
CALCULATED T AXIS, ECG11: 69 DEGREES
CHLORIDE SERPL-SCNC: 108 MMOL/L (ref 97–108)
CO2 SERPL-SCNC: 27 MMOL/L (ref 21–32)
CREAT SERPL-MCNC: 0.43 MG/DL (ref 0.7–1.3)
DIAGNOSIS, 93000: NORMAL
DIFFERENTIAL METHOD BLD: ABNORMAL
EOSINOPHIL # BLD: 0 K/UL (ref 0–0.4)
EOSINOPHIL NFR BLD: 0 % (ref 0–7)
ERYTHROCYTE [DISTWIDTH] IN BLOOD BY AUTOMATED COUNT: 13.6 % (ref 11.5–14.5)
FOLATE SERPL-MCNC: 19.2 NG/ML (ref 5–21)
GLOBULIN SER CALC-MCNC: 3.7 G/DL (ref 2–4)
GLUCOSE SERPL-MCNC: 79 MG/DL (ref 65–100)
HCT VFR BLD AUTO: 28.8 % (ref 36.6–50.3)
HGB BLD-MCNC: 9 G/DL (ref 12.1–17)
IMM GRANULOCYTES # BLD: 0.1 K/UL (ref 0–0.04)
IMM GRANULOCYTES NFR BLD AUTO: 1 % (ref 0–0.5)
LYMPHOCYTES # BLD: 1 K/UL (ref 0.8–3.5)
LYMPHOCYTES NFR BLD: 7 % (ref 12–49)
MAGNESIUM SERPL-MCNC: 1.6 MG/DL (ref 1.6–2.4)
MCH RBC QN AUTO: 32.1 PG (ref 26–34)
MCHC RBC AUTO-ENTMCNC: 31.3 G/DL (ref 30–36.5)
MCV RBC AUTO: 102.9 FL (ref 80–99)
MONOCYTES # BLD: 1.4 K/UL (ref 0–1)
MONOCYTES NFR BLD: 10 % (ref 5–13)
NEUTS SEG # BLD: 10.9 K/UL (ref 1.8–8)
NEUTS SEG NFR BLD: 82 % (ref 32–75)
NRBC # BLD: 0 K/UL (ref 0–0.01)
NRBC BLD-RTO: 0 PER 100 WBC
P-R INTERVAL, ECG05: 142 MS
PHOSPHATE SERPL-MCNC: 2.8 MG/DL (ref 2.6–4.7)
PLATELET # BLD AUTO: 131 K/UL (ref 150–400)
PMV BLD AUTO: 10.6 FL (ref 8.9–12.9)
POTASSIUM SERPL-SCNC: 4.1 MMOL/L (ref 3.5–5.1)
PROT SERPL-MCNC: 6.2 G/DL (ref 6.4–8.2)
Q-T INTERVAL, ECG07: 336 MS
QRS DURATION, ECG06: 80 MS
QTC CALCULATION (BEZET), ECG08: 420 MS
RBC # BLD AUTO: 2.8 M/UL (ref 4.1–5.7)
SODIUM SERPL-SCNC: 143 MMOL/L (ref 136–145)
VENTRICULAR RATE, ECG03: 94 BPM
VIT B12 SERPL-MCNC: 826 PG/ML (ref 193–986)
WBC # BLD AUTO: 13.3 K/UL (ref 4.1–11.1)

## 2018-06-24 PROCEDURE — 80053 COMPREHEN METABOLIC PANEL: CPT | Performed by: INTERNAL MEDICINE

## 2018-06-24 PROCEDURE — 74011250637 HC RX REV CODE- 250/637: Performed by: INTERNAL MEDICINE

## 2018-06-24 PROCEDURE — 83735 ASSAY OF MAGNESIUM: CPT | Performed by: INTERNAL MEDICINE

## 2018-06-24 PROCEDURE — 84100 ASSAY OF PHOSPHORUS: CPT | Performed by: INTERNAL MEDICINE

## 2018-06-24 PROCEDURE — 36415 COLL VENOUS BLD VENIPUNCTURE: CPT | Performed by: INTERNAL MEDICINE

## 2018-06-24 PROCEDURE — 74011250636 HC RX REV CODE- 250/636: Performed by: INTERNAL MEDICINE

## 2018-06-24 PROCEDURE — 65660000000 HC RM CCU STEPDOWN

## 2018-06-24 PROCEDURE — 74011000258 HC RX REV CODE- 258: Performed by: INTERNAL MEDICINE

## 2018-06-24 PROCEDURE — 82746 ASSAY OF FOLIC ACID SERUM: CPT | Performed by: FAMILY MEDICINE

## 2018-06-24 PROCEDURE — 82607 VITAMIN B-12: CPT | Performed by: FAMILY MEDICINE

## 2018-06-24 PROCEDURE — 74011250637 HC RX REV CODE- 250/637: Performed by: FAMILY MEDICINE

## 2018-06-24 PROCEDURE — 85025 COMPLETE CBC W/AUTO DIFF WBC: CPT | Performed by: INTERNAL MEDICINE

## 2018-06-24 RX ORDER — MUPIROCIN 20 MG/G
OINTMENT TOPICAL 2 TIMES DAILY
Status: DISCONTINUED | OUTPATIENT
Start: 2018-06-24 | End: 2018-06-27 | Stop reason: HOSPADM

## 2018-06-24 RX ADMIN — SODIUM CHLORIDE 100 ML/HR: 900 INJECTION, SOLUTION INTRAVENOUS at 21:07

## 2018-06-24 RX ADMIN — DIVALPROEX SODIUM 500 MG: 250 TABLET, DELAYED RELEASE ORAL at 08:22

## 2018-06-24 RX ADMIN — ENOXAPARIN SODIUM 30 MG: 30 INJECTION SUBCUTANEOUS at 17:10

## 2018-06-24 RX ADMIN — SODIUM CHLORIDE 100 ML/HR: 900 INJECTION, SOLUTION INTRAVENOUS at 09:10

## 2018-06-24 RX ADMIN — PHENOBARBITAL 32.4 MG: 32.4 TABLET ORAL at 17:10

## 2018-06-24 RX ADMIN — PANTOPRAZOLE SODIUM 40 MG: 40 TABLET, DELAYED RELEASE ORAL at 06:43

## 2018-06-24 RX ADMIN — SODIUM CHLORIDE 100 ML/HR: 900 INJECTION, SOLUTION INTRAVENOUS at 00:17

## 2018-06-24 RX ADMIN — PIPERACILLIN SODIUM AND TAZOBACTAM SODIUM 3.38 G: 3; .375 INJECTION, POWDER, LYOPHILIZED, FOR SOLUTION INTRAVENOUS at 02:50

## 2018-06-24 RX ADMIN — PIPERACILLIN SODIUM AND TAZOBACTAM SODIUM 3.38 G: 3; .375 INJECTION, POWDER, LYOPHILIZED, FOR SOLUTION INTRAVENOUS at 21:07

## 2018-06-24 RX ADMIN — ACETAMINOPHEN 650 MG: 325 TABLET ORAL at 22:43

## 2018-06-24 RX ADMIN — PANTOPRAZOLE SODIUM 40 MG: 40 TABLET, DELAYED RELEASE ORAL at 17:10

## 2018-06-24 RX ADMIN — MUPIROCIN: 20 OINTMENT TOPICAL at 09:39

## 2018-06-24 RX ADMIN — PHENOBARBITAL 32.4 MG: 32.4 TABLET ORAL at 08:22

## 2018-06-24 RX ADMIN — PIPERACILLIN SODIUM AND TAZOBACTAM SODIUM 3.38 G: 3; .375 INJECTION, POWDER, LYOPHILIZED, FOR SOLUTION INTRAVENOUS at 12:08

## 2018-06-24 RX ADMIN — DIVALPROEX SODIUM 125 MG: 250 TABLET, DELAYED RELEASE ORAL at 08:29

## 2018-06-24 RX ADMIN — DIVALPROEX SODIUM 125 MG: 250 TABLET, DELAYED RELEASE ORAL at 17:10

## 2018-06-24 RX ADMIN — DIVALPROEX SODIUM 500 MG: 250 TABLET, DELAYED RELEASE ORAL at 17:10

## 2018-06-24 RX ADMIN — MUPIROCIN: 20 OINTMENT TOPICAL at 17:09

## 2018-06-24 RX ADMIN — Medication 10 ML: at 02:49

## 2018-06-24 RX ADMIN — Medication 10 ML: at 13:20

## 2018-06-24 RX ADMIN — ASPIRIN 81 MG: 81 TABLET, COATED ORAL at 17:10

## 2018-06-24 RX ADMIN — DIVALPROEX SODIUM 125 MG: 250 TABLET, DELAYED RELEASE ORAL at 12:23

## 2018-06-24 RX ADMIN — Medication 10 ML: at 21:08

## 2018-06-24 NOTE — PROGRESS NOTES
Follow up visit on [facility]. Ruperto's brother was with him. His brother indicated their mom went home for a bit. They are hopeful Ruperto will be able to come home tomorrow. Ruperto made good eye contact, it is unclear how much he understood. Provided spiritual presence and assurance of prayers.   Visited by: Veda Song [address] [facility] Roberta ([id number])

## 2018-06-24 NOTE — PROGRESS NOTES
Problem: Pressure Injury - Risk of  Goal: *Prevention of pressure injury  Document Glenn Scale and appropriate interventions in the flowsheet. Outcome: Progressing Towards Goal  Pressure Injury Interventions:  Sensory Interventions: Assess changes in LOC, Keep linens dry and wrinkle-free, Minimize linen layers, Maintain/enhance activity level, Monitor skin under medical devices, Pad between skin to skin, Pressure redistribution bed/mattress (bed type), Sit a 90-degree angle/use footstool if needed, Turn and reposition approx.  every two hours (pillows and wedges if needed), Use 30-degree side-lying position    Moisture Interventions: Apply protective barrier, creams and emollients, Check for incontinence Q2 hours and as needed, Contain wound drainage, Limit adult briefs, Maintain skin hydration (lotion/cream), Minimize layers, Moisture barrier, Assess need for specialty bed, Absorbent underpads    Activity Interventions: Increase time out of bed, Pressure redistribution bed/mattress(bed type), Assess need for specialty bed    Mobility Interventions: HOB 30 degrees or less, Pressure redistribution bed/mattress (bed type), Assess need for specialty bed    Nutrition Interventions: Document food/fluid/supplement intake

## 2018-06-24 NOTE — PROGRESS NOTES
Spiritual Care Assessment/Progress Note  [place]      NAME: Adis Mckenna      MRN: [id number]  AGE: 47 y.o. SEX: male  Mosque Affiliation: No Adventist   Language: English     6/24/2018     Total Time (in minutes): 13     Spiritual Assessment begun in [facility] 3 PRO CARE TELE 2 through conversation with:         []Patient        [x] Family    [] Friend(s)        Reason for Consult: Advance medical directive consult     Spiritual beliefs: (Please include comment if needed)     [] Identifies with a shila tradition:         [] Supported by a shila community:            [] Claims no spiritual orientation:           [] Seeking spiritual identity:                [] Adheres to an individual form of spirituality:           [x] Not able to assess:                           Identified resources for coping:      [] Prayer                               [] Music                  [] Guided Imagery     [] Family/friends                 [] Pet visits     [] Devotional reading                         [] Unknown     [] Other:                                        Interventions offered during this visit: (See comments for more details)          Family/Friend(s): Affirmation of shila     Plan of Care:     [x] Support spiritual and/or cultural needs    [] Support AMD and/or advance care planning process      [] Support grieving process   [] Coordinate Rites and/or Rituals    [] Coordination with community clergy   [] No spiritual needs identified at this time   [] Detailed Plan of Care below (See Comments)  [] Make referral to Music Therapy  [] Make referral to Pet Therapy     [] Make referral to Addiction services  [] Make referral to [facility]  [] Make referral to Spiritual Care Partner  [] No future visits requested        [] Follow up visits as needed     Comments:Responded to request for a Advance Medical Directive Consult (AMD)  from In [facility].  Consulted with RN who indicated Mr. Popeye Mai is not able to make decisions for himself. Visited Mr. Ana Recinos briefly, RN was in the room.   Mr Ana Recinos mother was feeding him, as that appeared to be the focus at this time,  will return as able per his mother's request.  Keiko Confer by: Gómez Iqbal [id number]

## 2018-06-24 NOTE — ACP (ADVANCE CARE PLANNING)
Responded to request for a Advance Medical Directive Consult (AMD)  from In [facility]. Consulted with RN who indicated Mr. Ethan Mccartney is not able to make decisions for himself.     Visited by: Rashaad Hatch [address] [facility] Roberta ([id number])

## 2018-06-24 NOTE — PROGRESS NOTES
Daily Progress Note: 6/24/2018  Chago Griffin MD    Assessment/Plan:   Sepsis: due to UTI. Given dyspnea and cough, may have early aspiration PNA/pneumonitis as well. Initial CXR clear, Leukocytosis improving, afebrile  --cont zosyn  -- urine cx pending  ---blood cultures NGTD       Hypotension: due to sepsis. Lactate WNR. No evidence of end-organ damage or severe sepsis. Expect this to improve with IVF and IV abx as with last admission. If not, then broaden abx and add vasopressors.        UTI (urinary tract infection): as above. Follow urine culture    Macrocytic anemia: LFTs normal  --check B12, folate  --transfuse <7       Seizure disorder (HCC) (): mentation improving per mother at bedside. Defer checking AED levels and continue home AEDs at current dose       Cerebral palsy [facility]): chronic dysphagia and aspiration risk. On dysphagia pureed diet at home per pt's mother. Aspiration precautions.       Underweight: BMI 12.75, but largely unchanged. Likely where he is usually is due to CP.  Nutritionist consult     Code Status: DNR, d/w pt's mother, who is her POA      Surrogate decision maker: mother     Problem List:  Problem List as of 6/24/2018  Date Reviewed: 6/23/2018          Codes Class Noted - Resolved    Sepsis ([address] ICD-10-CM: A41.9  ICD-9-CM: 038.9, 995.91  7/8/2017 - Present        Aspiration pneumonia ([address] ICD-10-CM: J69.0  ICD-9-CM: 507.0  7/8/2017 - Present        Hypotension ICD-10-CM: I95.9  ICD-9-CM: 458.9  7/8/2017 - Present        Seizure disorder ([address] ICD-10-CM: G40.909  ICD-9-CM: 345.90  7/8/2017 - Present        Fever ICD-10-CM: R50.9  ICD-9-CM: 780.60  8/31/2014 - Present        SIRS (systemic inflammatory response syndrome) ([address] ICD-10-CM: R65.10  ICD-9-CM: 995.90  8/31/2014 - Present        Cough ICD-10-CM: R05  ICD-9-CM: 786.2  8/31/2014 - Present        Sepsis(995.91) ICD-10-CM: A41.9  ICD-9-CM: 995.91  6/15/2012 - Present        UTI (urinary tract infection) ICD-10-CM: N39.0  ICD-9-CM: 599.0  6/15/2012 - Present    Overview Signed 2012  3:58 PM by Ane Simpler     Escherichia coli    >100K col/mL             Phenobarbital toxicity ICD-10-CM: T42.3X1A  ICD-9-CM: 967.0, E980.1  6/15/2012 - Present        Cerebral palsy ([address] ICD-10-CM: G80.9  ICD-9-CM: 343.9  6/15/2012 - Present        Scoliosis ICD-10-CM: M41.9  ICD-9-CM: 737.30  6/15/2012 - Present        Dehydration ICD-10-CM: E86.0  ICD-9-CM: 276.51  6/15/2012 - Present        Leukocytosis, unspecified ICD-10-CM: D72.829  ICD-9-CM: 288.60  6/15/2012 - Present        Encephalopathy ICD-10-CM: G93.40  ICD-9-CM: 348.30  6/15/2012 - Present              Subjective:   Mr. Jose Jordan is a 47 y.o. male w/ hx of cerebral palsy, chronic dysphagia, seizure d/o who presents with dyspnea and lethargy. Started this morning. Constant, moderate, worsening, associated with rectal temp of 101.5. Some coughing. Pt nonverbal at baseline so history is limited to chart review and pt's mother at bedside.    ED workup showed temp 99.8, WBC 19.8K. CXR showed no acute process. UA showed severe pyuria with 3+ bacteriuria.   Mr. Jose Jordan is admitted for further evaluation and management of sepsis due to UTI. [Dr David Beckett    : Mother notes he coughed up thick mucus yesterday. Says his rattling cough is improved after this. BPs are still low, but leukocytosis is improving. Pt is more anemic today. Will follow labs. Tolerating PO.       Review of Systems:   A comprehensive review of systems was negative except for that written in the HPI.     Objective:   Physical Exam:     Visit Vitals    BP (!) 87/62 (BP 1 Location: Left arm, BP Patient Position: At rest)    Pulse 90    Temp 98.1 °F (36.7 °C)    Resp 15    Ht 5' 6\" (1.676 m)    Wt 42.2 kg (93 lb 0.6 oz)    SpO2 98%    BMI 15.02 kg/m2      O2 Device: Room air    Temp (24hrs), Av.4 °F (36.9 °C), Min:98 °F (36.7 °C), Max:99.8 °F (37.7 °C)         1901 -  0700  In: 2213.3 [P.O.:480; I.V.:1733.3]  Out: -     General:  Alert, mentation at baseline, smiles with conversation, no distress, thin   Head:  Normocephalic, without obvious abnormality, atraumatic. Eyes:  Conjunctivae/corneas clear. PERRL, EOMs intact. Nose: Nares normal. Septum midline. Mucosa normal. No drainage or sinus tenderness. Throat: Lips, mucosa, and tongue moist..   Neck: Supple, symmetrical, trachea midline, no adenopathy, thyroid: no enlargement/tenderness/nodules, no carotid bruit and no JVD. Back:   Symmetric, no curvature. ROM normal. No CVA tenderness. Lungs:   Clear to auscultation bilaterally, transmitted upper airway sounds, diminished at bases   Chest wall:  No tenderness or deformity. Heart:  Regular rate and rhythm, S1, S2 normal, no murmur, click, rub or gallop. Abdomen:   Soft, non-tender. Bowel sounds normal. No masses,  No organomegaly. Extremities: Extremities thin and contracted, atraumatic, no cyanosis or edema. No calf tenderness or cords. Pulses: 2+ and symmetric all extremities. Skin: Skin color, texture, turgor normal. R ear excoriated in folds and swollen   Neurologic: Extremities contracted with CP appearance, nonverbal, alert, tracks with face. Data Review:       Recent Days:  Recent Labs      06/24/18   0234  06/23/18   1300   WBC  13.3*  19.8*   HGB  9.0*  12.5   HCT  28.8*  38.0   PLT  131*  181     Recent Labs      06/24/18   0234  06/23/18   1300   NA  143  134*   K  4.1  4.1   CL  108  98   CO2  27  29   GLU  79  99   BUN  10  22*   CREA  0.43*  0.55*   CA  8.3*  9.1   MG  1.6   --    PHOS  2.8   --    ALB  2.5*  3.6   SGOT  8*  11*   ALT  8*  14     No results for input(s): PH, PCO2, PO2, HCO3, FIO2 in the last 72 hours.     24 Hour Results:  Recent Results (from the past 24 hour(s))   CBC WITH AUTOMATED DIFF    Collection Time: 06/23/18  1:00 PM   Result Value Ref Range    WBC 19.8 (H) 4.1 - 11.1 K/uL    RBC 3.77 (L) 4.10 - 5.70 M/uL    HGB 12.5 12.1 - 17.0 g/dL    HCT 38.0 36.6 - 50.3 %    .8 (H) 80.0 - 99.0 FL    MCH 33.2 26.0 - 34.0 PG    MCHC 32.9 30.0 - 36.5 g/dL    RDW 13.4 11.5 - 14.5 %    PLATELET 930 999 - 317 K/uL    MPV 10.3 8.9 - 12.9 FL    NRBC 0.0 0  WBC    ABSOLUTE NRBC 0.00 0.00 - 0.01 K/uL    NEUTROPHILS 85 (H) 32 - 75 %    BAND NEUTROPHILS 3 %    LYMPHOCYTES 2 (L) 12 - 49 %    MONOCYTES 10 5 - 13 %    EOSINOPHILS 0 0 - 7 %    BASOPHILS 0 0 - 1 %    IMMATURE GRANULOCYTES 0 0.0 - 0.5 %    ABS. NEUTROPHILS 17.4 (H) 1.8 - 8.0 K/UL    ABS. LYMPHOCYTES 0.4 (L) 0.8 - 3.5 K/UL    ABS. MONOCYTES 2.0 (H) 0.0 - 1.0 K/UL    ABS. EOSINOPHILS 0.0 0.0 - 0.4 K/UL    ABS. BASOPHILS 0.0 0.0 - 0.1 K/UL    ABS. IMM. GRANS. 0.0 0.00 - 0.04 K/UL    DF MANUAL      RBC COMMENTS NORMOCYTIC, NORMOCHROMIC     CULTURE, BLOOD    Collection Time: 06/23/18  1:00 PM   Result Value Ref Range    Special Requests: NO SPECIAL REQUESTS      Culture result: NO GROWTH AFTER 17 HOURS     METABOLIC PANEL, COMPREHENSIVE    Collection Time: 06/23/18  1:00 PM   Result Value Ref Range    Sodium 134 (L) 136 - 145 mmol/L    Potassium 4.1 3.5 - 5.1 mmol/L    Chloride 98 97 - 108 mmol/L    CO2 29 21 - 32 mmol/L    Anion gap 7 5 - 15 mmol/L    Glucose 99 65 - 100 mg/dL    BUN 22 (H) 6 - 20 MG/DL    Creatinine 0.55 (L) 0.70 - 1.30 MG/DL    BUN/Creatinine ratio 40 (H) 12 - 20      GFR est AA >60 >60 ml/min/1.73m2    GFR est non-AA >60 >60 ml/min/1.73m2    Calcium 9.1 8.5 - 10.1 MG/DL    Bilirubin, total 0.3 0.2 - 1.0 MG/DL    ALT (SGPT) 14 12 - 78 U/L    AST (SGOT) 11 (L) 15 - 37 U/L    Alk.  phosphatase 139 (H) 45 - 117 U/L    Protein, total 8.2 6.4 - 8.2 g/dL    Albumin 3.6 3.5 - 5.0 g/dL    Globulin 4.6 (H) 2.0 - 4.0 g/dL    A-G Ratio 0.8 (L) 1.1 - 2.2     LACTIC ACID    Collection Time: 06/23/18  1:00 PM   Result Value Ref Range    Lactic acid 1.6 0.4 - 2.0 MMOL/L   CULTURE, BLOOD    Collection Time: 06/23/18  1:19 PM   Result Value Ref Range    Special Requests: NO SPECIAL REQUESTS Culture result: NO GROWTH AFTER 17 HOURS     URINALYSIS W/ RFLX MICROSCOPIC    Collection Time: 06/23/18  1:19 PM   Result Value Ref Range    Color YELLOW/STRAW      Appearance CLOUDY (A) CLEAR      Specific gravity 1.019 1.003 - 1.030      pH (UA) 8.0 5.0 - 8.0      Protein TRACE (A) NEG mg/dL    Glucose NEGATIVE  NEG mg/dL    Ketone NEGATIVE  NEG mg/dL    Bilirubin NEGATIVE  NEG      Blood NEGATIVE  NEG      Urobilinogen 1.0 0.2 - 1.0 EU/dL    Nitrites NEGATIVE  NEG      Leukocyte Esterase LARGE (A) NEG      WBC >100 (H) 0 - 4 /hpf    RBC 0-5 0 - 5 /hpf    Epithelial cells FEW FEW /lpf    Bacteria 3+ (A) NEG /hpf   URINE CULTURE HOLD SAMPLE    Collection Time: 06/23/18  1:19 PM   Result Value Ref Range    Urine culture hold        URINE ON HOLD IN MICROBIOLOGY DEPT FOR 3 DAYS. IF UNPRESERVED URINE IS SUBMITTED, IT CANNOT BE USED FOR ADDITIONAL TESTING AFTER 24 HRS, RECOLLECTION WILL BE REQUIRED.    EKG, 12 LEAD, INITIAL    Collection Time: 06/23/18  4:17 PM   Result Value Ref Range    Ventricular Rate 94 BPM    Atrial Rate 94 BPM    P-R Interval 142 ms    QRS Duration 80 ms    Q-T Interval 336 ms    QTC Calculation (Bezet) 420 ms    Calculated P Axis 36 degrees    Calculated R Axis 82 degrees    Calculated T Axis 69 degrees    Diagnosis       Normal sinus rhythm  Normal ECG  When compared with ECG of 23-JUN-2018 13:02,  MANUAL COMPARISON REQUIRED, DATA IS UNCONFIRMED     METABOLIC PANEL, COMPREHENSIVE    Collection Time: 06/24/18  2:34 AM   Result Value Ref Range    Sodium 143 136 - 145 mmol/L    Potassium 4.1 3.5 - 5.1 mmol/L    Chloride 108 97 - 108 mmol/L    CO2 27 21 - 32 mmol/L    Anion gap 8 5 - 15 mmol/L    Glucose 79 65 - 100 mg/dL    BUN 10 6 - 20 MG/DL    Creatinine 0.43 (L) 0.70 - 1.30 MG/DL    BUN/Creatinine ratio 23 (H) 12 - 20      GFR est AA >60 >60 ml/min/1.73m2    GFR est non-AA >60 >60 ml/min/1.73m2    Calcium 8.3 (L) 8.5 - 10.1 MG/DL    Bilirubin, total 0.3 0.2 - 1.0 MG/DL    ALT (SGPT) 8 (L) 12 - 78 U/L    AST (SGOT) 8 (L) 15 - 37 U/L    Alk. phosphatase 102 45 - 117 U/L    Protein, total 6.2 (L) 6.4 - 8.2 g/dL    Albumin 2.5 (L) 3.5 - 5.0 g/dL    Globulin 3.7 2.0 - 4.0 g/dL    A-G Ratio 0.7 (L) 1.1 - 2.2     CBC WITH AUTOMATED DIFF    Collection Time: 06/24/18  2:34 AM   Result Value Ref Range    WBC 13.3 (H) 4.1 - 11.1 K/uL    RBC 2.80 (L) 4.10 - 5.70 M/uL    HGB 9.0 (L) 12.1 - 17.0 g/dL    HCT 28.8 (L) 36.6 - 50.3 %    .9 (H) 80.0 - 99.0 FL    MCH 32.1 26.0 - 34.0 PG    MCHC 31.3 30.0 - 36.5 g/dL    RDW 13.6 11.5 - 14.5 %    PLATELET 009 (L) 721 - 400 K/uL    MPV 10.6 8.9 - 12.9 FL    NRBC 0.0 0  WBC    ABSOLUTE NRBC 0.00 0.00 - 0.01 K/uL    NEUTROPHILS 82 (H) 32 - 75 %    LYMPHOCYTES 7 (L) 12 - 49 %    MONOCYTES 10 5 - 13 %    EOSINOPHILS 0 0 - 7 %    BASOPHILS 0 0 - 1 %    IMMATURE GRANULOCYTES 1 (H) 0.0 - 0.5 %    ABS. NEUTROPHILS 10.9 (H) 1.8 - 8.0 K/UL    ABS. LYMPHOCYTES 1.0 0.8 - 3.5 K/UL    ABS. MONOCYTES 1.4 (H) 0.0 - 1.0 K/UL    ABS. EOSINOPHILS 0.0 0.0 - 0.4 K/UL    ABS. BASOPHILS 0.0 0.0 - 0.1 K/UL    ABS. IMM.  GRANS. 0.1 (H) 0.00 - 0.04 K/UL    DF AUTOMATED     MAGNESIUM    Collection Time: 06/24/18  2:34 AM   Result Value Ref Range    Magnesium 1.6 1.6 - 2.4 mg/dL   PHOSPHORUS    Collection Time: 06/24/18  2:34 AM   Result Value Ref Range    Phosphorus 2.8 2.6 - 4.7 MG/DL       Medications reviewed  Current Facility-Administered Medications   Medication Dose Route Frequency    sodium chloride (NS) flush 5-10 mL  5-10 mL IntraVENous PRN    piperacillin-tazobactam (ZOSYN) 3.375 g in 0.9% sodium chloride (MBP/ADV) 100 mL  3.375 g IntraVENous Q8H    sodium chloride (NS) flush 5-10 mL  5-10 mL IntraVENous Q8H    sodium chloride (NS) flush 5-10 mL  5-10 mL IntraVENous PRN    naloxone (NARCAN) injection 0.4 mg  0.4 mg IntraVENous PRN    0.9% sodium chloride infusion  100 mL/hr IntraVENous CONTINUOUS    acetaminophen (TYLENOL) tablet 650 mg  650 mg Oral Q6H PRN    aspirin delayed-release tablet 81 mg  81 mg Oral DAILY WITH DINNER    divalproex DR (DEPAKOTE) tablet 125 mg  125 mg Oral TID WITH MEALS    docusate sodium (COLACE) capsule 100 mg  100 mg Oral BID PRN    pantoprazole (PROTONIX) tablet 40 mg  40 mg Oral ACB&D    PHENobarbital (LUMINAL) tablet 32.4 mg  32.4 mg Oral BID    divalproex DR (DEPAKOTE) tablet 500 mg  500 mg Oral BID WITH MEALS    enoxaparin (LOVENOX) injection 30 mg  30 mg SubCUTAneous Q24H    ascorbic acid (vitamin C) (VITAMIN C) tablet 1,000 mg  1,000 mg Oral DAILY       Care Plan discussed with: Patient/Family and Nurse    Total time spent with patient: 30 minutes.     Karol Ba MD

## 2018-06-24 NOTE — PROGRESS NOTES
Problem: Falls - Risk of  Goal: *Absence of Falls  Document Staci Fall Risk and appropriate interventions in the flowsheet.    Outcome: Progressing Towards Goal  Fall Risk Interventions:            Medication Interventions: Evaluate medications/consider consulting pharmacy, Patient to call before getting OOB, Teach patient to arise slowly, Bed/chair exit alarm    Elimination Interventions: Call light in reach, Elevated toilet seat, Patient to call for help with toileting needs, Toilet paper/wipes in reach, Toileting schedule/hourly rounds

## 2018-06-24 NOTE — PROGRESS NOTES
Bedside and Verbal shift change report given to aRven Whittington (oncoming nurse) by Pru (offgoing nurse). Report included the following information SBAR, ED Summary, Intake/Output, MAR and Recent Results. Patient was told to call by  in Oct.  To have mri of lt shoulder    Would like to go ahead and have   Orders put in system to schedule

## 2018-06-25 LAB
ANION GAP SERPL CALC-SCNC: 7 MMOL/L (ref 5–15)
ATRIAL RATE: 103 BPM
BACTERIA SPEC CULT: ABNORMAL
BASOPHILS # BLD: 0 K/UL (ref 0–0.1)
BASOPHILS NFR BLD: 0 % (ref 0–1)
BUN SERPL-MCNC: 7 MG/DL (ref 6–20)
BUN/CREAT SERPL: 17 (ref 12–20)
CALCIUM SERPL-MCNC: 8.8 MG/DL (ref 8.5–10.1)
CALCULATED P AXIS, ECG09: 74 DEGREES
CALCULATED R AXIS, ECG10: 87 DEGREES
CALCULATED T AXIS, ECG11: 79 DEGREES
CC UR VC: ABNORMAL
CHLORIDE SERPL-SCNC: 108 MMOL/L (ref 97–108)
CO2 SERPL-SCNC: 29 MMOL/L (ref 21–32)
CREAT SERPL-MCNC: 0.41 MG/DL (ref 0.7–1.3)
DIAGNOSIS, 93000: NORMAL
DIFFERENTIAL METHOD BLD: ABNORMAL
EOSINOPHIL # BLD: 0.1 K/UL (ref 0–0.4)
EOSINOPHIL NFR BLD: 1 % (ref 0–7)
ERYTHROCYTE [DISTWIDTH] IN BLOOD BY AUTOMATED COUNT: 13.7 % (ref 11.5–14.5)
GLUCOSE SERPL-MCNC: 79 MG/DL (ref 65–100)
HCT VFR BLD AUTO: 27.6 % (ref 36.6–50.3)
HGB BLD-MCNC: 8.7 G/DL (ref 12.1–17)
IMM GRANULOCYTES # BLD: 0 K/UL (ref 0–0.04)
IMM GRANULOCYTES NFR BLD AUTO: 1 % (ref 0–0.5)
LYMPHOCYTES # BLD: 0.8 K/UL (ref 0.8–3.5)
LYMPHOCYTES NFR BLD: 12 % (ref 12–49)
MCH RBC QN AUTO: 32.7 PG (ref 26–34)
MCHC RBC AUTO-ENTMCNC: 31.5 G/DL (ref 30–36.5)
MCV RBC AUTO: 103.8 FL (ref 80–99)
MONOCYTES # BLD: 0.6 K/UL (ref 0–1)
MONOCYTES NFR BLD: 9 % (ref 5–13)
NEUTS SEG # BLD: 5.1 K/UL (ref 1.8–8)
NEUTS SEG NFR BLD: 76 % (ref 32–75)
NRBC # BLD: 0 K/UL (ref 0–0.01)
NRBC BLD-RTO: 0 PER 100 WBC
P-R INTERVAL, ECG05: 132 MS
PLATELET # BLD AUTO: 143 K/UL (ref 150–400)
PMV BLD AUTO: 10.8 FL (ref 8.9–12.9)
POTASSIUM SERPL-SCNC: 3.8 MMOL/L (ref 3.5–5.1)
Q-T INTERVAL, ECG07: 336 MS
QRS DURATION, ECG06: 80 MS
QTC CALCULATION (BEZET), ECG08: 440 MS
RBC # BLD AUTO: 2.66 M/UL (ref 4.1–5.7)
SERVICE CMNT-IMP: ABNORMAL
SODIUM SERPL-SCNC: 144 MMOL/L (ref 136–145)
VENTRICULAR RATE, ECG03: 103 BPM
WBC # BLD AUTO: 6.6 K/UL (ref 4.1–11.1)

## 2018-06-25 PROCEDURE — 92610 EVALUATE SWALLOWING FUNCTION: CPT | Performed by: SPEECH-LANGUAGE PATHOLOGIST

## 2018-06-25 PROCEDURE — 36415 COLL VENOUS BLD VENIPUNCTURE: CPT | Performed by: FAMILY MEDICINE

## 2018-06-25 PROCEDURE — 85025 COMPLETE CBC W/AUTO DIFF WBC: CPT | Performed by: FAMILY MEDICINE

## 2018-06-25 PROCEDURE — 74011250636 HC RX REV CODE- 250/636: Performed by: INTERNAL MEDICINE

## 2018-06-25 PROCEDURE — 65660000000 HC RM CCU STEPDOWN

## 2018-06-25 PROCEDURE — 77030011256 HC DRSG MEPILEX <16IN NO BORD MOLN -A

## 2018-06-25 PROCEDURE — 74011250637 HC RX REV CODE- 250/637: Performed by: INTERNAL MEDICINE

## 2018-06-25 PROCEDURE — 74011000258 HC RX REV CODE- 258: Performed by: INTERNAL MEDICINE

## 2018-06-25 PROCEDURE — 80048 BASIC METABOLIC PNL TOTAL CA: CPT | Performed by: FAMILY MEDICINE

## 2018-06-25 RX ADMIN — DIVALPROEX SODIUM 125 MG: 250 TABLET, DELAYED RELEASE ORAL at 14:24

## 2018-06-25 RX ADMIN — OXYCODONE HYDROCHLORIDE AND ACETAMINOPHEN 1000 MG: 500 TABLET ORAL at 09:39

## 2018-06-25 RX ADMIN — PIPERACILLIN SODIUM AND TAZOBACTAM SODIUM 3.38 G: 3; .375 INJECTION, POWDER, LYOPHILIZED, FOR SOLUTION INTRAVENOUS at 14:24

## 2018-06-25 RX ADMIN — Medication 10 ML: at 21:46

## 2018-06-25 RX ADMIN — ENOXAPARIN SODIUM 30 MG: 30 INJECTION SUBCUTANEOUS at 18:20

## 2018-06-25 RX ADMIN — DIVALPROEX SODIUM 500 MG: 250 TABLET, DELAYED RELEASE ORAL at 09:39

## 2018-06-25 RX ADMIN — PANTOPRAZOLE SODIUM 40 MG: 40 TABLET, DELAYED RELEASE ORAL at 18:21

## 2018-06-25 RX ADMIN — ASPIRIN 81 MG: 81 TABLET, COATED ORAL at 18:21

## 2018-06-25 RX ADMIN — PHENOBARBITAL 32.4 MG: 32.4 TABLET ORAL at 18:21

## 2018-06-25 RX ADMIN — PHENOBARBITAL 32.4 MG: 32.4 TABLET ORAL at 09:39

## 2018-06-25 RX ADMIN — DIVALPROEX SODIUM 125 MG: 250 TABLET, DELAYED RELEASE ORAL at 09:44

## 2018-06-25 RX ADMIN — DIVALPROEX SODIUM 125 MG: 250 TABLET, DELAYED RELEASE ORAL at 18:21

## 2018-06-25 RX ADMIN — SODIUM CHLORIDE 100 ML/HR: 900 INJECTION, SOLUTION INTRAVENOUS at 23:52

## 2018-06-25 RX ADMIN — PANTOPRAZOLE SODIUM 40 MG: 40 TABLET, DELAYED RELEASE ORAL at 06:12

## 2018-06-25 RX ADMIN — MUPIROCIN: 20 OINTMENT TOPICAL at 09:39

## 2018-06-25 RX ADMIN — DIVALPROEX SODIUM 500 MG: 250 TABLET, DELAYED RELEASE ORAL at 18:21

## 2018-06-25 RX ADMIN — PIPERACILLIN SODIUM AND TAZOBACTAM SODIUM 3.38 G: 3; .375 INJECTION, POWDER, LYOPHILIZED, FOR SOLUTION INTRAVENOUS at 21:43

## 2018-06-25 RX ADMIN — Medication 10 ML: at 05:13

## 2018-06-25 RX ADMIN — PIPERACILLIN SODIUM AND TAZOBACTAM SODIUM 3.38 G: 3; .375 INJECTION, POWDER, LYOPHILIZED, FOR SOLUTION INTRAVENOUS at 05:22

## 2018-06-25 NOTE — PROGRESS NOTES
6- Reason for Admission:  Sepsis                   RRAT Score:   16               Do you (patient/family) have any concerns for transition/discharge? No               Plan for utilizing home health:   Yes      Likelihood of readmission? Moderate             Transition of Care Plan:   Home with family    I met with the pt's mother, Sylvie Bar ([phone]), to determine potential discharge needs. The pt requires total care and lives with his brother, Bran Arcos ([phone]), during the week and his mother and step-father on weekends, holidays and days when his day center is closed. He attends [facility] during the week and is transported there on a handicapped bus. He has hospital beds and wheelchairs at both his brother's and mother's houses. The family provide the care he requires but he has had [address] several times in the past for various healthcare issues. His mother would like to use them again if needed and since he was admitted with sepsis, an ACO diagnosis, this would be advisable. His PCP is Dr. Wilian Ga and she has no nurse navigator to notify. He has prescription drug coverage and gets his medications from [facility]. CM will continue to follow and assist as indicated. Care Management Interventions  PCP Verified by CM:  Yes (Dr. David Quintanilla nurse navigator)  Discharge Durable Medical Equipment: No  Physical Therapy Consult: No  Occupational Therapy Consult: No  Speech Therapy Consult: Yes  Current Support Network:  (Family cares for the pt in either his brother's or mother's home)  Confirm Follow Up Transport: Family  Plan discussed with Pt/Family/Caregiver: Yes  Discharge Location  Discharge Placement:  (Home with family)    [place] Dewayne, [place], CM

## 2018-06-25 NOTE — ACP (ADVANCE CARE PLANNING)
Follow up visit requested by staff on [facility]. Ruperto's mom Lucila Holcomb was asking about an Advance Medical Directive for herself. Explained about the AMD and why Ruperto cannot have one. She wants to make sure that her wishes are followed should anything happen to her. She has tried to look at the AMD paperwork herself and get confused and frustrated. Went to get a form to review it with her, when I returned Speech Therapist was working with Kelvin Mansfield. Encouraged Lucila Holcomb to look at the form and fill out what she thought was correct. I will do my best to come back tomorrow as I am able to review any questions and to aid in clarifications for the AMD information.

## 2018-06-25 NOTE — PROGRESS NOTES
Wound care note  Referred due to history of friction and pressure on Right ear in which he has a preference to lay on. Ear is red and irritated. Family has tried many things to relieve it per MOM. Today it has bandaides on it. Will try a small mepilex border with an ear hole cut out of the middle so he can hear. Gave Mom the name of the product so she can get more if it works. Suggested some offloading with some circular foam cutout.       Denver Devine, PT, DPT, [facility]

## 2018-06-25 NOTE — PROGRESS NOTES
Follow up visit requested by staff on [facility]. Ruperto's mom Herson Valderrama was asking about an Advance Medical Directive for herself. Explained about the AMD and why Ruperto cannot have one. She wants to make sure that her wishes are followed should anything happen to her. She has tried to look at the AMD paperwork herself and get confused and frustrated. Went to get a form to review it with her, when I returned Speech Therapist was working with Danutaaleshia. Encouraged Herson Valderrama to look at the form and fill out what she thought was correct. I will do my best to come back tomorrow as I am able to review any questions and to aid in clarifications for the AMD information. Ruperto appeared to be in a pretty good mood. He made eye contact quite a bit. It is difficult to know but Ruperto appears to be pretty comfortable with me. Provided spiritual presence ans assurance of prayers.   Visited by: Pee Jovel [phone] [facility] [address] ([id number])

## 2018-06-25 NOTE — PROGRESS NOTES
Problem: Falls - Risk of  Goal: *Absence of Falls  Document Staci Fall Risk and appropriate interventions in the flowsheet.    Outcome: Progressing Towards Goal  Fall Risk Interventions:       Mentation Interventions: Bed/chair exit alarm, Adequate sleep, hydration, pain control, Family/sitter at bedside, More frequent rounding, Increase mobility, Room close to nurse's station, Toileting rounds    Medication Interventions: Bed/chair exit alarm, Evaluate medications/consider consulting pharmacy    Elimination Interventions: Bed/chair exit alarm, Call light in reach, Patient to call for help with toileting needs, Toilet paper/wipes in reach, Toileting schedule/hourly rounds

## 2018-06-25 NOTE — PROGRESS NOTES
Problem: Dysphagia (Adult)  Goal: *Acute Goals and Plan of Care (Insert Text)  Speech Therapy Goals  Initiated 6/25/2018  1. Patient will tolerate pureed diet with honey thick liquids without signs/symptoms of aspiration within 7 day(s). Speech LAnguage Pathology bedside swallow evaluation  Patient: John Neil (47 y.o. male)  Date: 6/25/2018  Primary Diagnosis: Sepsis ([address]        Precautions: aspiration       ASSESSMENT :  Based on the objective data described below, the patient presents with at least moderate oropharyngeal dysphagia characterized by incoordinated oral motor movement with reduced labial seal on spoon, slow oral manipulation and transit and pharyngeal swallow delay. Patient with 3 swallows per each bolus. Patient tolerated all PO trials without overt s/s aspiration. Patient at high risk for aspiration given dysphagia and cerebral palsy. Patient's mother volunteered that she would not want a feeding tube for patient. Discussed aspiration precautions and reflux precautions. Patient's mother verbalized and demonstrated understanding of strategies. Given bedside presentation feel patient is safe for dysphagia 1 (pureed) diet and honey thickened liquids. Patient will benefit from skilled intervention to address the above impairments. Patients rehabilitation potential is considered to be Fair  Factors which may influence rehabilitation potential include:   []            None noted  [x]            Mental ability/status  [x]            Medical condition  [x]            Home/family situation and support systems  []            Safety awareness  []            Pain tolerance/management  []            Other:      PLAN :  Recommendations and Planned Interventions:  Pureed diet  Honey thickened liquids  Sit upright for all PO and 30 minutes after  Small single bites and sips    Frequency/Duration: Patient will be followed by speech-language pathology 3 times a week to address goals.   Discharge Recommendations: To Be Determined / Home health     SUBJECTIVE:   Patient non-verbal.  Mother at bedside. Mother reports pureed diet, honey thick liquids at baseline. Mother notes patient takes 3 swallows per 1 bite/sip recently and notes some coughing lately. OBJECTIVE:     Past Medical History:   Diagnosis Date    Aspiration pneumonia ([address]     Cerebral palsy (HCC)     Ill-defined condition     dsyphasia    Scoliosis     Seizure disorder (HCC)     Seizures (HCC)      Past Surgical History:   Procedure Laterality Date    HX HEENT       Prior Level of Function/Home Situation:   Home Situation  Home Environment: Private residence  One/Two Story Residence: Two story, live on 1st floor  Living Alone: No  Support Systems: Family member(s)  Patient Expects to be Discharged to[de-identified] Private residence  Current DME Used/Available at Home: Wheelchair, Hospital bed  Diet prior to admission: Pureed/honey  Current Diet:  Puree/honey   Cognitive and Communication Status:  Neurologic State: Alert                 Oral Assessment:  Oral Assessment  Labial: Decreased seal;Decreased rate  Dentition: Natural  Oral Hygiene: Moist oral mucosa. Lingual: Incoordinated  P.O. Trials:  Patient Position: Upright in bed  Vocal quality prior to P.O.:    Consistency Presented: Honey thick liquid;Puree  How Presented: SLP-fed/presented;Cup/sip;Spoon     Bolus Acceptance: Impaired  Bolus Formation/Control: Impaired  Type of Impairment: Delayed;Poor;Lip closure  Propulsion: Delayed (# of seconds)  Oral Residue: None  Initiation of Swallow: Delayed (# of seconds)  Laryngeal Elevation: Decreased  Aspiration Signs/Symptoms: None  Pharyngeal Phase Characteristics: Multiple swallows             Oral Phase Severity: Moderate  Pharyngeal Phase Severity : Moderate    NOMS:   The NOMS functional outcome measure was used to quantify this patient's level of swallowing impairment.   Based on the NOMS, the patient was determined to be at level 4 for swallow function     G Codes: In compliance with CMSs Claims Based Outcome Reporting, the following G-code set was chosen for this patient based the use of the NOMS functional outcome to quantify this patient's level of swallowing impairment. Using the NOMS, the patient was determined to be at level 4 for swallow function which correlates with the CK= 40-59% level of severity. Based on the objective assessment provided within this note, the current, goal, and discharge g-codes are as follows:    Swallow  Swallowing:   Swallow Current Status CK= 40-59%   Swallow Goal Status CK= 40-59%      NOMS Swallowing Levels:  Level 1 (CN): NPO  Level 2 (CM): NPO but takes consistency in therapy  Level 3 (CL): Takes less than 50% of nutrition p.o. and continues with nonoral feedings; and/or safe with mod cues; and/or max diet restriction  Level 4 (CK): Safe swallow but needs mod cues; and/or mod diet restriction; and/or still requires some nonoral feeding/supplements  Level 5 (CJ): Safe swallow with min diet restriction; and/or needs min cues  Level 6 (CI): Independent with p.o.; rare cues; usually self cues; may need to avoid some foods or needs extra time  Level 7 ([address] [place] [address]): Independent for all p.o.  TRINA. (2003). National Outcomes Measurement System (NOMS): Adult Speech-Language Pathology User's Guide. Pain:  Pain Scale 1: Adult Nonverbal Pain Scale  Pain Intensity 1: 0     After treatment:   []            Patient left in no apparent distress sitting up in chair  [x]            Patient left in no apparent distress in bed  []            Call bell left within reach  [x]            Nursing notified  [x]            Caregiver present  []            Bed alarm activated    COMMUNICATION/EDUCATION:   The patients plan of care including recommendations, planned interventions, and recommended diet changes were discussed with: Registered Nurse.     Patient was educated regarding role of SLP, diet consistency, swallow precautions and plan of care. Patient's mother verbalized understanding. []            Posted safety precautions in patient's room. [x]            Patient/family have participated as able in goal setting and plan of care. [x]            Patient/family agree to work toward stated goals and plan of care. []            Patient understands intent and goals of therapy, but is neutral about his/her participation. []            Patient is unable to participate in goal setting and plan of care.     Thank you for this referral.  Antonio Bass, SLP  Time Calculation: 19 mins

## 2018-06-25 NOTE — PROGRESS NOTES
Daily Progress Note: 6/25/2018  Harry Figueredo MD    Assessment/Plan:   Sepsis: due to UTI. --cont zosyn  -- urine cx with >100K Enterococcus - sensitivity pending  ---blood cultures NGTD       Hypotension: Improving; due to sepsis. Lactate WNR. No evidence of end-organ damage or severe sepsis.      UTI (urinary tract infection): as above. Follow urine culture    Macrocytic anemia: LFTs normal  --check B12, folate  --transfuse <7       Seizure disorder (HCC) ():  Defer checking AED levels and continue home AEDs at current dose       Cerebral palsy [facility]): chronic dysphagia and aspiration risk. On dysphagia pureed diet at home per pt's mother. Aspiration precautions.       Underweight: BMI 12.75, but largely unchanged. Likely where he is usually is due to CP.  Nutritionist consult     Code Status: DNR, d/w pt's mother, who is her POA      Surrogate decision maker: mother     Problem List:  Problem List as of 6/25/2018  Date Reviewed: 6/23/2018          Codes Class Noted - Resolved    Sepsis ([facility] ICD-10-CM: A41.9  ICD-9-CM: 038.9, 995.91  7/8/2017 - Present        Aspiration pneumonia ([facility] ICD-10-CM: J69.0  ICD-9-CM: 507.0  7/8/2017 - Present        Hypotension ICD-10-CM: I95.9  ICD-9-CM: 458.9  7/8/2017 - Present        Seizure disorder ([facility] ICD-10-CM: G40.909  ICD-9-CM: 345.90  7/8/2017 - Present        Fever ICD-10-CM: R50.9  ICD-9-CM: 780.60  8/31/2014 - Present        SIRS (systemic inflammatory response syndrome) ([facility] ICD-10-CM: R65.10  ICD-9-CM: 995.90  8/31/2014 - Present        Cough ICD-10-CM: R05  ICD-9-CM: 786.2  8/31/2014 - Present        Sepsis(995.91) ICD-10-CM: A41.9  ICD-9-CM: 995.91  6/15/2012 - Present        UTI (urinary tract infection) ICD-10-CM: N39.0  ICD-9-CM: 599.0  6/15/2012 - Present    Overview Signed 6/17/2012  3:58 PM by Stewart Gracia     Escherichia coli    >100K col/mL             Phenobarbital toxicity ICD-10-CM: T42.3X1A  ICD-9-CM: 967.0, E980.1  6/15/2012 - Present        Cerebral palsy ([facility] ICD-10-CM: G80.9  ICD-9-CM: 343.9  6/15/2012 - Present        Scoliosis ICD-10-CM: M41.9  ICD-9-CM: 737.30  6/15/2012 - Present        Dehydration ICD-10-CM: E86.0  ICD-9-CM: 276.51  6/15/2012 - Present        Leukocytosis, unspecified ICD-10-CM: D72.829  ICD-9-CM: 288.60  6/15/2012 - Present        Encephalopathy ICD-10-CM: G93.40  ICD-9-CM: 348.30  6/15/2012 - Present              Subjective:   Mr. Doug Sabillon is a 47 y.o. male w/ hx of cerebral palsy, chronic dysphagia, seizure d/o who presents with dyspnea and lethargy. Started this morning. Constant, moderate, worsening, associated with rectal temp of 101.5. Some coughing. Pt nonverbal at baseline so history is limited to chart review and pt's mother at bedside.    ED workup showed temp 99.8, WBC 19.8K. CXR showed no acute process. UA showed severe pyuria with 3+ bacteriuria.   Mr. Doug Sabillon is admitted for further evaluation and management of sepsis due to UTI. [Dr Ferdinand Barrow    : Mother notes he coughed up thick mucus yesterday. Says his rattling cough is improved after this. BPs are still low, but leukocytosis is improving. Pt is more anemic today. Will follow labs. Tolerating PO.    :  He looks comfortable. Nurses report no new symptoms. WBC back to normal range. Urine Culture with >100,000K Enterococcus.       Review of Systems:   A comprehensive review of systems was negative except for that written in the HPI. Objective:   Physical Exam:     Visit Vitals    /62 (BP 1 Location: Left arm, BP Patient Position: At rest)    Pulse 77    Temp 97.5 °F (36.4 °C)    Resp 21    Ht 5' 6\" (1.676 m)    Wt 44.7 kg (98 lb 8.7 oz)    SpO2 99%    BMI 15.91 kg/m2      O2 Device: Room air    Temp (24hrs), Av.4 °F (36.3 °C), Min:97.1 °F (36.2 °C), Max:97.6 °F (36.4 °C)        1 -  0700  In: 8681 [P.O.:650;  I.V.:3775]  Out: -   General:  Alert, mentation at baseline, smiles with conversation, no distress, thin   Head:  Normocephalic, without obvious abnormality, atraumatic. Eyes:  Conjunctivae/corneas clear. PERRL, EOMs intact. Nose: Nares normal. Septum midline. Mucosa normal. No drainage or sinus tenderness. Throat: Lips, mucosa, and tongue moist..   Neck: Supple, symmetrical, trachea midline, no adenopathy, thyroid: no enlargement/tenderness/nodules, no carotid bruit and no JVD. Back:   Symmetric, no curvature. ROM normal. No CVA tenderness. Lungs:   Clear to auscultation bilaterally, transmitted upper airway sounds, diminished at bases   Chest wall:  No tenderness or deformity. Heart:  Regular rate and rhythm, S1, S2 normal, no murmur, click, rub or gallop. Abdomen:   Soft, non-tender. Bowel sounds normal. No masses,  No organomegaly. Extremities: Extremities thin and contracted, atraumatic, no cyanosis or edema. No calf tenderness or cords. Skin: Skin color, texture, turgor normal. R ear dressed. Neurologic: Extremities contracted with CP appearance, nonverbal, alert, tracks with face. Data Review:       Recent Days:  Recent Labs      06/25/18   0515  06/24/18   0234  06/23/18   1300   WBC  6.6  13.3*  19.8*   HGB  8.7*  9.0*  12.5   HCT  27.6*  28.8*  38.0   PLT  143*  131*  181     Recent Labs      06/25/18   0515  06/24/18   0234  06/23/18   1300   NA  144  143  134*   K  3.8  4.1  4.1   CL  108  108  98   CO2  29  27  29   GLU  79  79  99   BUN  7  10  22*   CREA  0.41*  0.43*  0.55*   CA  8.8  8.3*  9.1   MG   --   1.6   --    PHOS   --   2.8   --    ALB   --   2.5*  3.6   SGOT   --   8*  11*   ALT   --   8*  14     No results for input(s): PH, PCO2, PO2, HCO3, FIO2 in the last 72 hours.     24 Hour Results:  Recent Results (from the past 24 hour(s))   CBC WITH AUTOMATED DIFF    Collection Time: 06/25/18  5:15 AM   Result Value Ref Range    WBC 6.6 4.1 - 11.1 K/uL    RBC 2.66 (L) 4.10 - 5.70 M/uL    HGB 8.7 (L) 12.1 - 17.0 g/dL    HCT 27.6 (L) 36.6 - 50.3 %    .8 (H) 80.0 - 99.0 FL    MCH 32.7 26.0 - 34.0 PG    MCHC 31.5 30.0 - 36.5 g/dL    RDW 13.7 11.5 - 14.5 %    PLATELET 779 (L) 986 - 400 K/uL    MPV 10.8 8.9 - 12.9 FL    NRBC 0.0 0  WBC    ABSOLUTE NRBC 0.00 0.00 - 0.01 K/uL    NEUTROPHILS 76 (H) 32 - 75 %    LYMPHOCYTES 12 12 - 49 %    MONOCYTES 9 5 - 13 %    EOSINOPHILS 1 0 - 7 %    BASOPHILS 0 0 - 1 %    IMMATURE GRANULOCYTES 1 (H) 0.0 - 0.5 %    ABS. NEUTROPHILS 5.1 1.8 - 8.0 K/UL    ABS. LYMPHOCYTES 0.8 0.8 - 3.5 K/UL    ABS. MONOCYTES 0.6 0.0 - 1.0 K/UL    ABS. EOSINOPHILS 0.1 0.0 - 0.4 K/UL    ABS. BASOPHILS 0.0 0.0 - 0.1 K/UL    ABS. IMM.  GRANS. 0.0 0.00 - 0.04 K/UL    DF AUTOMATED     METABOLIC PANEL, BASIC    Collection Time: 06/25/18  5:15 AM   Result Value Ref Range    Sodium 144 136 - 145 mmol/L    Potassium 3.8 3.5 - 5.1 mmol/L    Chloride 108 97 - 108 mmol/L    CO2 29 21 - 32 mmol/L    Anion gap 7 5 - 15 mmol/L    Glucose 79 65 - 100 mg/dL    BUN 7 6 - 20 MG/DL    Creatinine 0.41 (L) 0.70 - 1.30 MG/DL    BUN/Creatinine ratio 17 12 - 20      GFR est AA >60 >60 ml/min/1.73m2    GFR est non-AA >60 >60 ml/min/1.73m2    Calcium 8.8 8.5 - 10.1 MG/DL       Medications reviewed  Current Facility-Administered Medications   Medication Dose Route Frequency    mupirocin (BACTROBAN) 2 % ointment   Topical BID    sodium chloride (NS) flush 5-10 mL  5-10 mL IntraVENous PRN    piperacillin-tazobactam (ZOSYN) 3.375 g in 0.9% sodium chloride (MBP/ADV) 100 mL  3.375 g IntraVENous Q8H    sodium chloride (NS) flush 5-10 mL  5-10 mL IntraVENous Q8H    sodium chloride (NS) flush 5-10 mL  5-10 mL IntraVENous PRN    naloxone (NARCAN) injection 0.4 mg  0.4 mg IntraVENous PRN    0.9% sodium chloride infusion  100 mL/hr IntraVENous CONTINUOUS    acetaminophen (TYLENOL) tablet 650 mg  650 mg Oral Q6H PRN    aspirin delayed-release tablet 81 mg  81 mg Oral DAILY WITH DINNER    divalproex DR (DEPAKOTE) tablet 125 mg  125 mg Oral TID WITH MEALS    docusate sodium (COLACE) capsule 100 mg  100 mg Oral BID PRN    pantoprazole (PROTONIX) tablet 40 mg  40 mg Oral ACB&D    PHENobarbital (LUMINAL) tablet 32.4 mg  32.4 mg Oral BID    divalproex DR (DEPAKOTE) tablet 500 mg  500 mg Oral BID WITH MEALS    enoxaparin (LOVENOX) injection 30 mg  30 mg SubCUTAneous Q24H    ascorbic acid (vitamin C) (VITAMIN C) tablet 1,000 mg  1,000 mg Oral DAILY       Care Plan discussed with: Patient and Nurse    Total time spent with patient: 30 minutes.     Jamel Hernandez MD

## 2018-06-25 NOTE — PROGRESS NOTES
Bedside and Verbal shift change report given to Godfrey Tracy RN (oncoming nurse) by Octaviano Sykes RN (offgoing nurse). Report included the following information SBAR and Kardex.

## 2018-06-25 NOTE — PROGRESS NOTES
NUTRITION     RECOMMENDATIONS:     1. SLP evaluation for appropriate diet consistency, pt with recent difficulty with Pureed, thickened liquids  2. Aspiration Precautions    ASSESSMENT:   6/25: Consult received for general nutrition management and supplements. Admitted with dyspnea, lethargy. Currently with Sepsis due to UTI. Pt known to RD from admission around one year ago. Pt with Cerebral Palsy, mother and brothers care for pt. He eats pureed diet with thickened liquids at home, unclear if Nectar or Honey thick. Last year, Honey thick was recommended by SLP. Patient does see a SLP regularly per mother. Mother states he usually eats well, does not do well with meat, she adds protein powder to his food and Vitamin C. He also eats Ensure Pudding. She has not had him weighed since last year, he cannot do a standing weight. Difficult to assess for any fat/muscle loss. She states his appetite currently  is not as good as usual and within the past few weeks has had trouble managing his diet. BMI is not a good indicator of weight status as fat stores are depleted and muscle stores are low with patients with cerebral palsy. Mother is unsure of any weight changes, states \" he feels the same. \"  She noticed his clavicle sticking out but \"it could have been the way he was laying\" per mother. She currently is thickening his liquids at the nursing station with a can of thickener. His current diet does not specify thickened liquids, discussed with nurse and recommended SLP referral.  Mother states he ate ~70% of gravy and biscuit, all of applesauce and bites of eggs and grits mixed with peaches at breakfast this morning. RD added Prosource protein powder 2 packets Q meal to provide an additional 36 gms protein and 180 kcals, as well as Ensure Pudding Q meal which provides an additional 170 kcals and 4 gms protein per serving.       Past Medical History:   Diagnosis Date    Aspiration pneumonia ([address]     Cerebral palsy ([facility]     Ill-defined condition     dsyphasia    Scoliosis     Seizure disorder ([facility]     Seizures ([facility]        Diet: Pureed    Abd:    wdl        BM: 6/23    Skin Integrity: []Intact  [x]Other: excoriation: groin, sacrum, R ear  Edema: [x]None []Other    Nutritionally Significant Medications: [x] Reviewed & Includes: Vitamin C 100 mg, Colace, Protonix, Phenobarbital,  Zosyn,     Labs:    Lab Results   Component Value Date/Time    Sodium 144 06/25/2018 05:15 AM    Potassium 3.8 06/25/2018 05:15 AM    Chloride 108 06/25/2018 05:15 AM    CO2 29 06/25/2018 05:15 AM    Anion gap 7 06/25/2018 05:15 AM    Glucose 79 06/25/2018 05:15 AM    BUN 7 06/25/2018 05:15 AM    Creatinine 0.41 (L) 06/25/2018 05:15 AM    Calcium 8.8 06/25/2018 05:15 AM    Magnesium 1.6 06/24/2018 02:34 AM    Phosphorus 2.8 06/24/2018 02:34 AM    Albumin 2.5 (L) 06/24/2018 02:34 AM       Anthropometrics:   Weight Source: Bed (6/25 0514)  Height: 5' 6\" (167.6 cm), Height Source: Stated by family member  Body mass index is 15.91 kg/(m^2). IBW : 64.4 kg (142 lb), % IBW (Calculated): 69.4 %, Usual Body Weight: 35.8 kg (79 lb),    Wt Readings from Last 5 Encounters:   06/25/18 44.7 kg (98 lb 8.7 oz)   08/05/17 38.6 kg (85 lb)   07/16/17 40.4 kg (89 lb 1.1 oz)   03/10/16 35.8 kg (79 lb)   03/06/16 35.8 kg (79 lb)       Estimated Daily Nutrition Requirements:   Weight Used:  Other (comment) (79 lbs/36 kgs, wt stated by mother)  Kcals: 1256 Kcals/day Based on:Kcal/kg - specify (Comment) (35 kcals/kg)  Protein: 43 g (1.2 gms/kg)   Fluid: 1260 ml (35 ml/kg)      Education & Discharge Needs:   [] Pt discussed in ID rounds     Nutrition related discharge needs addressed:     [] Supplements (on d/c instruction &/or coupons provided)    [] Tube Feedings     [] Education    [x]No nutrition related discharge needs at this time     Cultural, Confucianism and ethnic food preferences identified    [x] None   [] Yes     NUTRITION DIAGNOSIS:     Swallowing difficulty related to cerebral palsy  as evidenced by need for pureed thickend liquids, recent difficulty with diet                     Pt is at Nutrition Risk:  [x]    No Nutrition Risk Identified:  []    RD INTERVENTION / PT GOALS:     Food/Nutrient Delivery:   , Supplements: Commercial supplement (Ensure pudding Q meal and 2 packets of Prosource Powder Q meal),  ,  ,    Nutrition Education: ,    Nutrition Counseling:    Coordination of Care:  Recommend SLP evaluation    Goal: Patient will receive appropriate diet consistency for safe swallow withn 2-3 days    MONITORING & EVALUATION:   Food/Nutrient Intake Outcomes: Liquid meal replacement, Total energy intake, Protein intake          Previous Nutrition Goals:  Previous Goal Met: N/A  Previous Recommendations:      Previous Recommendations Implemented: N/A       Shashi Virk RD

## 2018-06-25 NOTE — CDMP QUERY
1.    Please clarify if this patient is (was) being treated/managed for:     => Functional quadriplegia in the setting of cerebral palsy as evidenced by weakness, contractures and atrophy of all extremities, being treated with supportive care of family and staff  => Other explanation of clinical findings   => Clinically Undetermined (no explanation for clinical findings)    46 y/o white male with history of cerebral palsy, presents with lethargy and is admitted for sepsis due to UTI and possible aspiration pneumonia. Is noted on nursing assessment that all 4 extremities are \"weak, contracted and atrophied\", and that he requires total assistance of family and staff for all care. Please clarify and document your clinical opinion in the progress notes and discharge summary including the definitive and/or presumptive diagnosis, (suspected or probable), related to the above clinical findings. Please include clinical findings supporting your diagnosis. Thanks for your time.     Leeanna Dakin RN, BSN  [phone]

## 2018-06-26 ENCOUNTER — APPOINTMENT (OUTPATIENT)
Dept: GENERAL RADIOLOGY | Age: 53
DRG: 871 | End: 2018-06-26
Attending: FAMILY MEDICINE
Payer: MEDICARE

## 2018-06-26 LAB
ANION GAP SERPL CALC-SCNC: 7 MMOL/L (ref 5–15)
BASOPHILS # BLD: 0 K/UL (ref 0–0.1)
BASOPHILS NFR BLD: 1 % (ref 0–1)
BUN SERPL-MCNC: 8 MG/DL (ref 6–20)
BUN/CREAT SERPL: 19 (ref 12–20)
CALCIUM SERPL-MCNC: 8.7 MG/DL (ref 8.5–10.1)
CHLORIDE SERPL-SCNC: 108 MMOL/L (ref 97–108)
CO2 SERPL-SCNC: 29 MMOL/L (ref 21–32)
CREAT SERPL-MCNC: 0.42 MG/DL (ref 0.7–1.3)
DIFFERENTIAL METHOD BLD: ABNORMAL
EOSINOPHIL # BLD: 0.1 K/UL (ref 0–0.4)
EOSINOPHIL NFR BLD: 2 % (ref 0–7)
ERYTHROCYTE [DISTWIDTH] IN BLOOD BY AUTOMATED COUNT: 13.6 % (ref 11.5–14.5)
GLUCOSE SERPL-MCNC: 83 MG/DL (ref 65–100)
HCT VFR BLD AUTO: 30.5 % (ref 36.6–50.3)
HGB BLD-MCNC: 9.5 G/DL (ref 12.1–17)
IMM GRANULOCYTES # BLD: 0 K/UL (ref 0–0.04)
IMM GRANULOCYTES NFR BLD AUTO: 1 % (ref 0–0.5)
LYMPHOCYTES # BLD: 1 K/UL (ref 0.8–3.5)
LYMPHOCYTES NFR BLD: 16 % (ref 12–49)
MCH RBC QN AUTO: 32.3 PG (ref 26–34)
MCHC RBC AUTO-ENTMCNC: 31.1 G/DL (ref 30–36.5)
MCV RBC AUTO: 103.7 FL (ref 80–99)
MONOCYTES # BLD: 0.6 K/UL (ref 0–1)
MONOCYTES NFR BLD: 10 % (ref 5–13)
NEUTS SEG # BLD: 4.2 K/UL (ref 1.8–8)
NEUTS SEG NFR BLD: 71 % (ref 32–75)
NRBC # BLD: 0 K/UL (ref 0–0.01)
NRBC BLD-RTO: 0 PER 100 WBC
PLATELET # BLD AUTO: 152 K/UL (ref 150–400)
PMV BLD AUTO: 10.6 FL (ref 8.9–12.9)
POTASSIUM SERPL-SCNC: 4.3 MMOL/L (ref 3.5–5.1)
RBC # BLD AUTO: 2.94 M/UL (ref 4.1–5.7)
SODIUM SERPL-SCNC: 144 MMOL/L (ref 136–145)
WBC # BLD AUTO: 6 K/UL (ref 4.1–11.1)

## 2018-06-26 PROCEDURE — 65270000029 HC RM PRIVATE

## 2018-06-26 PROCEDURE — 85025 COMPLETE CBC W/AUTO DIFF WBC: CPT | Performed by: FAMILY MEDICINE

## 2018-06-26 PROCEDURE — 71045 X-RAY EXAM CHEST 1 VIEW: CPT

## 2018-06-26 PROCEDURE — 74011250637 HC RX REV CODE- 250/637: Performed by: FAMILY MEDICINE

## 2018-06-26 PROCEDURE — 80048 BASIC METABOLIC PNL TOTAL CA: CPT | Performed by: FAMILY MEDICINE

## 2018-06-26 PROCEDURE — 74011250636 HC RX REV CODE- 250/636: Performed by: INTERNAL MEDICINE

## 2018-06-26 PROCEDURE — 36415 COLL VENOUS BLD VENIPUNCTURE: CPT | Performed by: FAMILY MEDICINE

## 2018-06-26 PROCEDURE — 74011000258 HC RX REV CODE- 258: Performed by: INTERNAL MEDICINE

## 2018-06-26 PROCEDURE — 74011250637 HC RX REV CODE- 250/637: Performed by: INTERNAL MEDICINE

## 2018-06-26 RX ORDER — LEVOFLOXACIN 750 MG/1
750 TABLET ORAL DAILY
Status: DISCONTINUED | OUTPATIENT
Start: 2018-06-26 | End: 2018-06-27 | Stop reason: HOSPADM

## 2018-06-26 RX ADMIN — ASPIRIN 81 MG: 81 TABLET, COATED ORAL at 17:01

## 2018-06-26 RX ADMIN — DIVALPROEX SODIUM 500 MG: 250 TABLET, DELAYED RELEASE ORAL at 09:48

## 2018-06-26 RX ADMIN — PANTOPRAZOLE SODIUM 40 MG: 40 TABLET, DELAYED RELEASE ORAL at 17:01

## 2018-06-26 RX ADMIN — PHENOBARBITAL 32.4 MG: 32.4 TABLET ORAL at 09:49

## 2018-06-26 RX ADMIN — ENOXAPARIN SODIUM 30 MG: 30 INJECTION SUBCUTANEOUS at 17:01

## 2018-06-26 RX ADMIN — DIVALPROEX SODIUM 125 MG: 250 TABLET, DELAYED RELEASE ORAL at 09:49

## 2018-06-26 RX ADMIN — PIPERACILLIN SODIUM AND TAZOBACTAM SODIUM 3.38 G: 3; .375 INJECTION, POWDER, LYOPHILIZED, FOR SOLUTION INTRAVENOUS at 12:25

## 2018-06-26 RX ADMIN — OXYCODONE HYDROCHLORIDE AND ACETAMINOPHEN 1000 MG: 500 TABLET ORAL at 09:49

## 2018-06-26 RX ADMIN — Medication 10 ML: at 12:29

## 2018-06-26 RX ADMIN — DIVALPROEX SODIUM 125 MG: 250 TABLET, DELAYED RELEASE ORAL at 18:04

## 2018-06-26 RX ADMIN — ACETAMINOPHEN 650 MG: 325 TABLET ORAL at 17:01

## 2018-06-26 RX ADMIN — LEVOFLOXACIN 750 MG: 750 TABLET, FILM COATED ORAL at 17:01

## 2018-06-26 RX ADMIN — DIVALPROEX SODIUM 500 MG: 250 TABLET, DELAYED RELEASE ORAL at 17:01

## 2018-06-26 RX ADMIN — DIVALPROEX SODIUM 125 MG: 250 TABLET, DELAYED RELEASE ORAL at 12:26

## 2018-06-26 RX ADMIN — PIPERACILLIN SODIUM AND TAZOBACTAM SODIUM 3.38 G: 3; .375 INJECTION, POWDER, LYOPHILIZED, FOR SOLUTION INTRAVENOUS at 04:46

## 2018-06-26 RX ADMIN — PHENOBARBITAL 32.4 MG: 32.4 TABLET ORAL at 17:01

## 2018-06-26 RX ADMIN — MUPIROCIN: 20 OINTMENT TOPICAL at 09:00

## 2018-06-26 RX ADMIN — PANTOPRAZOLE SODIUM 40 MG: 40 TABLET, DELAYED RELEASE ORAL at 09:30

## 2018-06-26 NOTE — PROGRESS NOTES
6- CASE MANAGEMENT NOTE:  Per the MD consult and the mother's choice, I sent a referral thru [employer] to [facility] for home RN and ST services and they have accepted. CM will follow and notify them of his discharge daate. Care Management Interventions  PCP Verified by CM:  Yes (Dr. Ihsan Rm nurse navigator)  Transition of Care Consult (CM Consult): [address]: No  Reason Outside [place]:  (Family choice-has used [facility] in the past)  Discharge Durable Medical Equipment: No  Physical Therapy Consult: No  Occupational Therapy Consult: No  Speech Therapy Consult: Yes  Current Support Network:  (Family cares for the pt in either his brother's or mother's home)  Confirm Follow Up Transport: Family  Plan discussed with Pt/Family/Caregiver: Yes  Discharge Location  Discharge Placement:  (Home with family)    Lynette Jaspal, [place], CM

## 2018-06-26 NOTE — PROGRESS NOTES
Follow up visit with Ruperto's mother who has been putting off doing an [employer] Medical Directive (AMD) for herself. Reviewed and filled out the AMD, for Ruperto's mother. Provided her with copies and the original.  Provided assurance of prayers.   Visited by: Junaid King [id number] [facility] Steph Granados ([id number])

## 2018-06-26 NOTE — PROGRESS NOTES
Problem: Falls - Risk of  Goal: *Absence of Falls  Document Staci Fall Risk and appropriate interventions in the flowsheet. Outcome: Progressing Towards Goal  Fall Risk Interventions:       Mentation Interventions: Door open when patient unattended, Adequate sleep, hydration, pain control    Medication Interventions: Assess postural VS orthostatic hypotension, Bed/chair exit alarm, Evaluate medications/consider consulting pharmacy    Elimination Interventions: Elevated toilet seat, Bed/chair exit alarm, Call light in reach             Problem: Pressure Injury - Risk of  Goal: *Prevention of pressure injury  Document Glenn Scale and appropriate interventions in the flowsheet.    Outcome: Progressing Towards Goal  Pressure Injury Interventions:  Sensory Interventions: Assess need for specialty bed, Avoid rigorous massage over bony prominences, Assess changes in LOC    Moisture Interventions: Assess need for specialty bed, Apply protective barrier, creams and emollients, Absorbent underpads    Activity Interventions: Assess need for specialty bed, Chair cushion, Increase time out of bed    Mobility Interventions: Assess need for specialty bed, Chair cushion, Float heels    Nutrition Interventions: Discuss nutritional consult with provider, Document food/fluid/supplement intake    Friction and Shear Interventions: Feet elevated on foot rest, Foam dressings/transparent film/skin sealants, Apply protective barrier, creams and emollients               Problem: Urinary Tract Infection - Adult  Goal: *Absence of infection signs and symptoms  Outcome: Progressing Towards Goal  ABX

## 2018-06-26 NOTE — PROGRESS NOTES
Bedside and Verbal shift change report given to Shilpa Mathews RN (oncoming nurse) by Gifty Morton RN (offgoing nurse). Report included the following information SBAR, Kardex, Accordion, Recent Results and Cardiac Rhythm NSR. Pt resting in bed with brother at bedside. Brother will stay through the nite in chair (no cot available). Condom catheter replaced and bed pads replaced when condom cath fell off. Bedside and Verbal shift change report given to Nicole Beckham RN (oncoming nurse) by Shilpa Mathews RN (offgoing nurse). Report included the following information SBAR, Kardex, MAR, Recent Results and Cardiac Rhythm NSR.

## 2018-06-26 NOTE — ROUTINE PROCESS
TRANSFER - OUT REPORT:    Verbal report given to Jess(name) on Lizzette  being transferred to 5th floor(unit) for routine progression of care       Report consisted of patients Situation, Background, Assessment and   Recommendations(SBAR). Information from the following report(s) SBAR, Kardex, Procedure Summary and Intake/Output was reviewed with the receiving nurse. Lines:   Peripheral IV 06/23/18 Left Arm (Active)   Site Assessment Clean, dry, & intact 6/26/2018  9:00 AM   Phlebitis Assessment 0 6/26/2018  9:00 AM   Infiltration Assessment 0 6/26/2018  9:00 AM   Dressing Status Clean, dry, & intact 6/26/2018  9:00 AM   Dressing Type Transparent 6/26/2018  9:00 AM   Hub Color/Line Status Pink 6/26/2018  9:00 AM   Action Taken Open ports on tubing capped 6/26/2018  9:00 AM   Alcohol Cap Used Yes 6/26/2018  9:00 AM        Opportunity for questions and clarification was provided.       Patient transported with:   [employer]

## 2018-06-26 NOTE — PROGRESS NOTES
Problem: Falls - Risk of  Goal: *Absence of Falls  Document Staci Fall Risk and appropriate interventions in the flowsheet. Outcome: Progressing Towards Goal  Fall Risk Interventions:       Mentation Interventions: Familiar objects from home, Family/sitter at bedside    Medication Interventions: Evaluate medications/consider consulting pharmacy    Elimination Interventions: Toileting schedule/hourly rounds             Problem: Pressure Injury - Risk of  Goal: *Prevention of pressure injury  Document Glenn Scale and appropriate interventions in the flowsheet.    Outcome: Progressing Towards Goal  Pressure Injury Interventions:  Sensory Interventions: Assess changes in LOC    Moisture Interventions: Absorbent underpads, Internal/External urinary devices    Activity Interventions: Pressure redistribution bed/mattress(bed type)    Mobility Interventions: Pressure redistribution bed/mattress (bed type)    Nutrition Interventions: Document food/fluid/supplement intake    Friction and Shear Interventions: Apply protective barrier, creams and emollients

## 2018-06-26 NOTE — CDMP QUERY
2.   The diagnosis of possible early aspiration pneumonia has been rendered for this patient with cerebral palsy and noted high risk of aspiration by SLP. Medical record shows as well that serial CXR x 2 reveal no acute changes. Please further specify if possible early aspiration pneumonia has been ruled in or out.     => Aspiration pneumonia ruled out on further study   => Aspiration pneumonia being treated with IV Zosyn  => Other explanation of clinical findings   => Clinically Undetermined (no explanation for clinical findings)    The medical record reflects the following clinical findings, treatment, and risk factors. Risk Factors:  cerebral palsy, SLP eval shows high aspiration risk with recommendations for dysphagia 1 diet with honey thick liquids    Clinical Indicators:  48 y/o white male with history of CP admitted for sepsis due to UTI. H and P and subsequent notes state \" may have early aspiration pneumonia as well. 6/24 notes \"rattling cough with thick  mucus and leukocytosis of 12.5 with L shift of 85 neutrophils. CXR x 2 (6/23 and and 6/26 with no acute process). Treatment: IV Zosyn being given for sepsis presumed due to UTI. Please clarify and document your clinical opinion in the progress notes and discharge summary including the definitive and/or presumptive diagnosis, (suspected or probable), related to the above clinical findings. Please include clinical findings supporting your diagnosis. Thanks for your time.     Carole Ortiz RN, BSN  [phone]

## 2018-06-26 NOTE — PROGRESS NOTES
Daily Progress Note: 6/26/2018  Chago Griffin MD    Assessment/Plan:   Sepsis: due to UTI. -- urine cx with >100K Enterococcus - sensitive to Cipro - switch today  ---blood cultures NGTD       Hypotension: Improving; due to sepsis. Lactate WNR. No evidence of end-organ damage or severe sepsis.      UTI (urinary tract infection): as above. Follow urine culture    Macrocytic anemia: LFTs normal  --B12, folate ok  --transfuse <7       Seizure disorder (HCC) ():  Defer checking AED levels and continue home AEDs at current dose       Cerebral palsy [facility]): chronic dysphagia and aspiration risk. On dysphagia pureed diet at home per pt's mother. Aspiration precautions.       Underweight: BMI 12.75, but largely unchanged. Likely where he is usually is due to CP.  Nutritionist consult    Functional quadriplegia in the setting of cerebral palsy as evidenced by weakness, contractures and atrophy of all extremities, being treated with supportive care of family and staff     Code Status: DNR, d/w pt's mother, who is her POA      Surrogate decision maker: mother     Problem List:  Problem List as of 6/26/2018  Date Reviewed: 6/23/2018          Codes Class Noted - Resolved    Sepsis ([facility] ICD-10-CM: A41.9  ICD-9-CM: 038.9, 995.91  7/8/2017 - Present        Aspiration pneumonia ([facility] ICD-10-CM: J69.0  ICD-9-CM: 507.0  7/8/2017 - Present        Hypotension ICD-10-CM: I95.9  ICD-9-CM: 458.9  7/8/2017 - Present        Seizure disorder ([facility] ICD-10-CM: G40.909  ICD-9-CM: 345.90  7/8/2017 - Present        Fever ICD-10-CM: R50.9  ICD-9-CM: 780.60  8/31/2014 - Present        SIRS (systemic inflammatory response syndrome) ([facility] ICD-10-CM: R65.10  ICD-9-CM: 995.90  8/31/2014 - Present        Cough ICD-10-CM: R05  ICD-9-CM: 786.2  8/31/2014 - Present        Sepsis(995.91) ICD-10-CM: A41.9  ICD-9-CM: 995.91  6/15/2012 - Present        UTI (urinary tract infection) ICD-10-CM: N39.0  ICD-9-CM: 599.0  6/15/2012 - Present Overview Signed 6/17/2012  3:58 PM by Kaleigh Dickson     Escherichia coli    >100K col/mL             Phenobarbital toxicity ICD-10-CM: T42.3X1A  ICD-9-CM: 967.0, E980.1  6/15/2012 - Present        Cerebral palsy ([address] ICD-10-CM: G80.9  ICD-9-CM: 343.9  6/15/2012 - Present        Scoliosis ICD-10-CM: M41.9  ICD-9-CM: 737.30  6/15/2012 - Present        Dehydration ICD-10-CM: E86.0  ICD-9-CM: 276.51  6/15/2012 - Present        Leukocytosis, unspecified ICD-10-CM: D72.829  ICD-9-CM: 288.60  6/15/2012 - Present        Encephalopathy ICD-10-CM: G93.40  ICD-9-CM: 348.30  6/15/2012 - Present              Subjective:   Mr. Clhoé Spangler is a 47 y.o. male w/ hx of cerebral palsy, chronic dysphagia, seizure d/o who presents with dyspnea and lethargy. Started this morning. Constant, moderate, worsening, associated with rectal temp of 101.5. Some coughing. Pt nonverbal at baseline so history is limited to chart review and pt's mother at bedside.    ED workup showed temp 99.8, WBC 19.8K. CXR showed no acute process. UA showed severe pyuria with 3+ bacteriuria.   Mr. Chloé Spangler is admitted for further evaluation and management of sepsis due to UTI. [Dr Emily Parrish    6/24: Mother notes he coughed up thick mucus yesterday. Says his rattling cough is improved after this. BPs are still low, but leukocytosis is improving. Pt is more anemic today. Will follow labs. Tolerating PO.    6/25:  He looks comfortable. Nurses report no new symptoms. WBC back to normal range. Urine Culture with >100,000K Enterococcus. 6/26:  Little change. Urine culture with Enterococcus sens to Cipro - switch today and watch for 24 hours and then home if ok.       Review of Systems:   A comprehensive review of systems was negative except for that written in the HPI.     Objective:   Physical Exam:     Visit Vitals    /70 (BP 1 Location: Left arm, BP Patient Position: At rest;Supine)    Pulse 78    Temp 97.5 °F (36.4 °C)    Resp 18    Ht 5' 6\" (1.676 m)  Wt 44.7 kg (98 lb 8.7 oz)    SpO2 99%    BMI 15.91 kg/m2      O2 Device: Room air    Temp (24hrs), Av.6 °F (36.4 °C), Min:97.5 °F (36.4 °C), Max:97.8 °F (36.6 °C)    1901 -  0700  In: -   Out: 700 [Urine:700]   701 - 1900  In: 2691.7 [P.O.:650; I.V.:2041.7]  Out: -   General:  Alert, mentation at baseline, smiles with conversation, no distress, thin   Head:  Normocephalic, without obvious abnormality, atraumatic. Eyes:  Conjunctivae/corneas clear. PERRL, EOMs intact. Nose: Nares normal. Septum midline. Mucosa normal. No drainage or sinus tenderness. Throat: Lips, mucosa, and tongue moist..   Neck: Supple, symmetrical, trachea midline, no adenopathy, thyroid: no enlargement/tenderness/nodules, no carotid bruit and no JVD. Back:   Symmetric, no curvature. ROM normal. No CVA tenderness. Lungs:   Clear to auscultation bilaterally, transmitted upper airway sounds, diminished at bases   Chest wall:  No tenderness or deformity. Heart:  Regular rate and rhythm, S1, S2 normal, no murmur, click, rub or gallop. Abdomen:   Soft, non-tender. Bowel sounds normal. No masses,  No organomegaly. Extremities: Extremities thin and contracted, atraumatic, no cyanosis or edema. No calf tenderness or cords. Skin:  turgor normal. R ear dressed. Neurologic: Extremities contracted with CP appearance, nonverbal, alert, tracks with face.        Data Review:       Recent Days:  Recent Labs      18   0119  18   0515  18   0234   WBC  6.0  6.6  13.3*   HGB  9.5*  8.7*  9.0*   HCT  30.5*  27.6*  28.8*   PLT  152  143*  131*     Recent Labs      18   0119  18   0515  18   0234  18   1300   NA  144  144  143  134*   K  4.3  3.8  4.1  4.1   CL  108  108  108  98   CO2  29  29  27  29   GLU  83  79  79  99   BUN  8  7  10  22*   CREA  0.42*  0.41*  0.43*  0.55*   CA  8.7  8.8  8.3*  9.1   MG   --    --   1.6   --    PHOS   --    --   2.8   --    ALB   -- --   2.5*  3.6   SGOT   --    --   8*  11*   ALT   --    --   8*  14     No results for input(s): PH, PCO2, PO2, HCO3, FIO2 in the last 72 hours. 24 Hour Results:  Recent Results (from the past 24 hour(s))   CBC WITH AUTOMATED DIFF    Collection Time: 06/26/18  1:19 AM   Result Value Ref Range    WBC 6.0 4.1 - 11.1 K/uL    RBC 2.94 (L) 4.10 - 5.70 M/uL    HGB 9.5 (L) 12.1 - 17.0 g/dL    HCT 30.5 (L) 36.6 - 50.3 %    .7 (H) 80.0 - 99.0 FL    MCH 32.3 26.0 - 34.0 PG    MCHC 31.1 30.0 - 36.5 g/dL    RDW 13.6 11.5 - 14.5 %    PLATELET 847 645 - 909 K/uL    MPV 10.6 8.9 - 12.9 FL    NRBC 0.0 0  WBC    ABSOLUTE NRBC 0.00 0.00 - 0.01 K/uL    NEUTROPHILS 71 32 - 75 %    LYMPHOCYTES 16 12 - 49 %    MONOCYTES 10 5 - 13 %    EOSINOPHILS 2 0 - 7 %    BASOPHILS 1 0 - 1 %    IMMATURE GRANULOCYTES 1 (H) 0.0 - 0.5 %    ABS. NEUTROPHILS 4.2 1.8 - 8.0 K/UL    ABS. LYMPHOCYTES 1.0 0.8 - 3.5 K/UL    ABS. MONOCYTES 0.6 0.0 - 1.0 K/UL    ABS. EOSINOPHILS 0.1 0.0 - 0.4 K/UL    ABS. BASOPHILS 0.0 0.0 - 0.1 K/UL    ABS. IMM.  GRANS. 0.0 0.00 - 0.04 K/UL    DF AUTOMATED     METABOLIC PANEL, BASIC    Collection Time: 06/26/18  1:19 AM   Result Value Ref Range    Sodium 144 136 - 145 mmol/L    Potassium 4.3 3.5 - 5.1 mmol/L    Chloride 108 97 - 108 mmol/L    CO2 29 21 - 32 mmol/L    Anion gap 7 5 - 15 mmol/L    Glucose 83 65 - 100 mg/dL    BUN 8 6 - 20 MG/DL    Creatinine 0.42 (L) 0.70 - 1.30 MG/DL    BUN/Creatinine ratio 19 12 - 20      GFR est AA >60 >60 ml/min/1.73m2    GFR est non-AA >60 >60 ml/min/1.73m2    Calcium 8.7 8.5 - 10.1 MG/DL     Medications reviewed  Current Facility-Administered Medications   Medication Dose Route Frequency    mupirocin (BACTROBAN) 2 % ointment   Topical BID    sodium chloride (NS) flush 5-10 mL  5-10 mL IntraVENous PRN    piperacillin-tazobactam (ZOSYN) 3.375 g in 0.9% sodium chloride (MBP/ADV) 100 mL  3.375 g IntraVENous Q8H    sodium chloride (NS) flush 5-10 mL  5-10 mL IntraVENous Q8H  sodium chloride (NS) flush 5-10 mL  5-10 mL IntraVENous PRN    naloxone (NARCAN) injection 0.4 mg  0.4 mg IntraVENous PRN    0.9% sodium chloride infusion  100 mL/hr IntraVENous CONTINUOUS    acetaminophen (TYLENOL) tablet 650 mg  650 mg Oral Q6H PRN    aspirin delayed-release tablet 81 mg  81 mg Oral DAILY WITH DINNER    divalproex DR (DEPAKOTE) tablet 125 mg  125 mg Oral TID WITH MEALS    docusate sodium (COLACE) capsule 100 mg  100 mg Oral BID PRN    pantoprazole (PROTONIX) tablet 40 mg  40 mg Oral ACB&D    PHENobarbital (LUMINAL) tablet 32.4 mg  32.4 mg Oral BID    divalproex DR (DEPAKOTE) tablet 500 mg  500 mg Oral BID WITH MEALS    enoxaparin (LOVENOX) injection 30 mg  30 mg SubCUTAneous Q24H    ascorbic acid (vitamin C) (VITAMIN C) tablet 1,000 mg  1,000 mg Oral DAILY     Care Plan discussed with: Patient, mother and Nurse  Total time spent with patient: 30 minutes.     Rachelle Dawn MD

## 2018-06-27 VITALS
DIASTOLIC BLOOD PRESSURE: 65 MMHG | HEIGHT: 66 IN | HEART RATE: 84 BPM | TEMPERATURE: 97.7 F | OXYGEN SATURATION: 100 % | SYSTOLIC BLOOD PRESSURE: 103 MMHG | BODY MASS INDEX: 16.01 KG/M2 | WEIGHT: 99.65 LBS | RESPIRATION RATE: 16 BRPM

## 2018-06-27 LAB
ANION GAP SERPL CALC-SCNC: 4 MMOL/L (ref 5–15)
BASOPHILS # BLD: 0 K/UL (ref 0–0.1)
BASOPHILS NFR BLD: 0 % (ref 0–1)
BUN SERPL-MCNC: 10 MG/DL (ref 6–20)
BUN/CREAT SERPL: 26 (ref 12–20)
CALCIUM SERPL-MCNC: 8.8 MG/DL (ref 8.5–10.1)
CHLORIDE SERPL-SCNC: 106 MMOL/L (ref 97–108)
CO2 SERPL-SCNC: 30 MMOL/L (ref 21–32)
CREAT SERPL-MCNC: 0.38 MG/DL (ref 0.7–1.3)
DIFFERENTIAL METHOD BLD: ABNORMAL
EOSINOPHIL # BLD: 0.1 K/UL (ref 0–0.4)
EOSINOPHIL NFR BLD: 2 % (ref 0–7)
ERYTHROCYTE [DISTWIDTH] IN BLOOD BY AUTOMATED COUNT: 13.4 % (ref 11.5–14.5)
GLUCOSE SERPL-MCNC: 76 MG/DL (ref 65–100)
HCT VFR BLD AUTO: 30.2 % (ref 36.6–50.3)
HGB BLD-MCNC: 9.5 G/DL (ref 12.1–17)
IMM GRANULOCYTES # BLD: 0.1 K/UL (ref 0–0.04)
IMM GRANULOCYTES NFR BLD AUTO: 1 % (ref 0–0.5)
LYMPHOCYTES # BLD: 0.8 K/UL (ref 0.8–3.5)
LYMPHOCYTES NFR BLD: 16 % (ref 12–49)
MCH RBC QN AUTO: 32.3 PG (ref 26–34)
MCHC RBC AUTO-ENTMCNC: 31.5 G/DL (ref 30–36.5)
MCV RBC AUTO: 102.7 FL (ref 80–99)
MONOCYTES # BLD: 0.5 K/UL (ref 0–1)
MONOCYTES NFR BLD: 11 % (ref 5–13)
NEUTS SEG # BLD: 3.6 K/UL (ref 1.8–8)
NEUTS SEG NFR BLD: 71 % (ref 32–75)
NRBC # BLD: 0 K/UL (ref 0–0.01)
NRBC BLD-RTO: 0 PER 100 WBC
PLATELET # BLD AUTO: 162 K/UL (ref 150–400)
PMV BLD AUTO: 10.3 FL (ref 8.9–12.9)
POTASSIUM SERPL-SCNC: 4 MMOL/L (ref 3.5–5.1)
RBC # BLD AUTO: 2.94 M/UL (ref 4.1–5.7)
SODIUM SERPL-SCNC: 140 MMOL/L (ref 136–145)
WBC # BLD AUTO: 5.1 K/UL (ref 4.1–11.1)

## 2018-06-27 PROCEDURE — 74011250636 HC RX REV CODE- 250/636: Performed by: INTERNAL MEDICINE

## 2018-06-27 PROCEDURE — 74011250637 HC RX REV CODE- 250/637: Performed by: FAMILY MEDICINE

## 2018-06-27 PROCEDURE — 80048 BASIC METABOLIC PNL TOTAL CA: CPT | Performed by: FAMILY MEDICINE

## 2018-06-27 PROCEDURE — 85025 COMPLETE CBC W/AUTO DIFF WBC: CPT | Performed by: FAMILY MEDICINE

## 2018-06-27 PROCEDURE — 77030037877 HC DRSG MEPILEX >48IN BORD MOLN -A

## 2018-06-27 PROCEDURE — 74011250637 HC RX REV CODE- 250/637: Performed by: INTERNAL MEDICINE

## 2018-06-27 PROCEDURE — 36415 COLL VENOUS BLD VENIPUNCTURE: CPT | Performed by: FAMILY MEDICINE

## 2018-06-27 RX ORDER — MUPIROCIN 20 MG/G
OINTMENT TOPICAL 2 TIMES DAILY
Qty: 22 G | Refills: 3 | Status: SHIPPED | OUTPATIENT
Start: 2018-06-27 | End: 2018-12-30

## 2018-06-27 RX ORDER — LEVOFLOXACIN 750 MG/1
750 TABLET ORAL DAILY
Qty: 5 TAB | Refills: 0 | Status: SHIPPED | OUTPATIENT
Start: 2018-06-27 | End: 2018-12-30

## 2018-06-27 RX ADMIN — PHENOBARBITAL 32.4 MG: 32.4 TABLET ORAL at 09:06

## 2018-06-27 RX ADMIN — OXYCODONE HYDROCHLORIDE AND ACETAMINOPHEN 1000 MG: 500 TABLET ORAL at 09:06

## 2018-06-27 RX ADMIN — PANTOPRAZOLE SODIUM 40 MG: 40 TABLET, DELAYED RELEASE ORAL at 06:59

## 2018-06-27 RX ADMIN — LEVOFLOXACIN 750 MG: 750 TABLET, FILM COATED ORAL at 09:06

## 2018-06-27 RX ADMIN — DIVALPROEX SODIUM 125 MG: 250 TABLET, DELAYED RELEASE ORAL at 10:27

## 2018-06-27 RX ADMIN — DIVALPROEX SODIUM 500 MG: 250 TABLET, DELAYED RELEASE ORAL at 09:06

## 2018-06-27 RX ADMIN — MUPIROCIN: 20 OINTMENT TOPICAL at 11:00

## 2018-06-27 RX ADMIN — SODIUM CHLORIDE 100 ML/HR: 900 INJECTION, SOLUTION INTRAVENOUS at 07:00

## 2018-06-27 NOTE — PROGRESS NOTES
I have reviewed discharge instructions with the patient and parent. The patient and parent verbalized understanding. Discharge medications reviewed with patient and caregiver and appropriate educational materials and side effects teaching were provided.

## 2018-06-27 NOTE — DISCHARGE INSTRUCTIONS
Patient Discharge Instructions    Lia Reed / [id number] : 1965    Admitted 2018 Discharged: 2018 7:14 AM     ACUTE DIAGNOSES:  Sepsis ([facility]    CHRONIC MEDICAL DIAGNOSES:  Problem List as of 2018  Date Reviewed: 2018          Codes Class Noted - Resolved    Sepsis ([place] [address].) ICD-10-CM: A41.9  ICD-9-CM: 038.9, 995.91  2017 - Present        Aspiration pneumonia ([place] [address].) ICD-10-CM: J69.0  ICD-9-CM: 507.0  2017 - Present        Hypotension ICD-10-CM: I95.9  ICD-9-CM: 458.9  2017 - Present        Seizure disorder ([place] [address] ICD-10-CM: G40.909  ICD-9-CM: 345.90  2017 - Present        Fever ICD-10-CM: R50.9  ICD-9-CM: 780.60  2014 - Present        SIRS (systemic inflammatory response syndrome) ([place] [address] ICD-10-CM: R65.10  ICD-9-CM: 995.90  2014 - Present        Cough ICD-10-CM: R05  ICD-9-CM: 786.2  2014 - Present        Sepsis(995.91) ICD-10-CM: A41.9  ICD-9-CM: 995.91  6/15/2012 - Present        UTI (urinary tract infection) ICD-10-CM: N39.0  ICD-9-CM: 599.0  6/15/2012 - Present    Overview Signed 2012  3:58 PM by Rose Ahl     Escherichia coli    >100K col/mL             Phenobarbital toxicity ICD-10-CM: T42.3X1A  ICD-9-CM: 967.0, E980.1  6/15/2012 - Present        Cerebral palsy ([facility] ICD-10-CM: G80.9  ICD-9-CM: 343.9  6/15/2012 - Present        Scoliosis ICD-10-CM: M41.9  ICD-9-CM: 737.30  6/15/2012 - Present        Dehydration ICD-10-CM: E86.0  ICD-9-CM: 276.51  6/15/2012 - Present        Leukocytosis, unspecified ICD-10-CM: D72.829  ICD-9-CM: 288.60  6/15/2012 - Present        Encephalopathy ICD-10-CM: G93.40  ICD-9-CM: 348.30  6/15/2012 - Present              DISCHARGE MEDICATIONS:         · It is important that you take the medication exactly as they are prescribed. · Keep your medication in the bottles provided by the pharmacist and keep a list of the medication names, dosages, and times to be taken in your wallet.    · Do not take other medications without consulting your doctor. DIET:  Resume home diet    ACTIVITY: Activity as tolerated    ADDITIONAL INFORMATION: If you experience any of the following symptoms then please call your primary care physician or return to the emergency room if you cannot get hold of your doctor: Fever, chills, nausea, vomiting, diarrhea, change in mentation, falling, bleeding, shortness of breath. FOLLOW UP CARE:  Dr. Kedar Wall MD  you are to call and set up an appointment to see them with in 1 week. Follow-up with specialists at directed by them      Information obtained by :  I understand that if any problems occur once I am at home I am to contact my physician. I understand and acknowledge receipt of the instructions indicated above.                                                                                                                                            Physician's or R.N.'s Signature                                                                  Date/Time                                                                                                                                              Patient or Representative Signature                                                          Date/Time

## 2018-06-27 NOTE — PROGRESS NOTES
CM Note:  AVS was sent in [facility] to [facility]. Pt to be transported before noon to home by his family.   EULALIO Mckeon

## 2018-06-27 NOTE — DISCHARGE SUMMARY
Physician Discharge Summary     Patient ID:    Jenny Moncdaa  [id number]  47 y.o.  1965  Katelynn Cordero MD    Admit date: 6/23/2018  Discharge date and time: 6/27/2018  Admission Diagnoses: Sepsis [facility])    Chronic Diagnoses:    Problem List as of 6/27/2018  Date Reviewed: 6/23/2018          Codes Class Noted - Resolved    Sepsis ([facility] ICD-10-CM: A41.9  ICD-9-CM: 038.9, 995.91  7/8/2017 - Present        Aspiration pneumonia ([facility] ICD-10-CM: J69.0  ICD-9-CM: 507.0  7/8/2017 - Present        Hypotension ICD-10-CM: I95.9  ICD-9-CM: 458.9  7/8/2017 - Present        Seizure disorder ([facility] ICD-10-CM: G40.909  ICD-9-CM: 345.90  7/8/2017 - Present        Fever ICD-10-CM: R50.9  ICD-9-CM: 780.60  8/31/2014 - Present        SIRS (systemic inflammatory response syndrome) ([facility] ICD-10-CM: R65.10  ICD-9-CM: 995.90  8/31/2014 - Present        Cough ICD-10-CM: R05  ICD-9-CM: 786.2  8/31/2014 - Present        Sepsis(995.91) ICD-10-CM: A41.9  ICD-9-CM: 995.91  6/15/2012 - Present        UTI (urinary tract infection) ICD-10-CM: N39.0  ICD-9-CM: 599.0  6/15/2012 - Present    Overview Signed 6/17/2012  3:58 PM by Stewart Gracia     Escherichia coli    >100K col/mL             Phenobarbital toxicity ICD-10-CM: T42.3X1A  ICD-9-CM: 967.0, E980.1  6/15/2012 - Present        Cerebral palsy ([facility] ICD-10-CM: G80.9  ICD-9-CM: 343.9  6/15/2012 - Present        Scoliosis ICD-10-CM: M41.9  ICD-9-CM: 737.30  6/15/2012 - Present        Dehydration ICD-10-CM: E86.0  ICD-9-CM: 276.51  6/15/2012 - Present        Leukocytosis, unspecified ICD-10-CM: D72.829  ICD-9-CM: 288.60  6/15/2012 - Present        Encephalopathy ICD-10-CM: G93.40  ICD-9-CM: 348.30  6/15/2012 - Present            Discharge Medications:   Current Discharge Medication List      START taking these medications    Details   levoFLOXacin (LEVAQUIN) 750 mg tablet Take 1 Tab by mouth daily.   Qty: 5 Tab, Refills: 0      mupirocin (BACTROBAN) 2 % ointment Apply  to affected area two (2) times a day. Apply to ear  Qty: 22 g, Refills: 3         CONTINUE these medications which have NOT CHANGED    Details   aspirin delayed-release 81 mg tablet Take 81 mg by mouth daily (with dinner). ascorbic acid, vitamin C, powd Take 1,000 mg by mouth daily. Mix with liquid prior to administration. omeprazole (PRILOSEC) 20 mg capsule Take 20 mg by mouth Before breakfast and dinner. PHENobarbital (LUMINAL) 30 mg tablet Take 30 mg by mouth two (2) times a day. divalproex DR (DEPAKOTE) 125 mg tablet Take 125 mg by mouth three (3) times daily. acetaminophen (TYLENOL) 500 mg tablet Take 1,000 mg by mouth every six (6) hours as needed for Pain. DOCUSATE CALCIUM (STOOL SOFTENER PO) Take 1 tablet by mouth two (2) times daily as needed. divalproex DR (DEPAKOTE) 500 mg tablet Take 1 Tab by mouth two (2) times a day. Qty: 60 Tab, Refills: 0    Comments: Needs a follow up for additional refills  Associated Diagnoses: Epilepsy ([facility]                 Follow up Care:    1. Anna Handy MD with in 1 weeks  2. specialists as directed. Diet:  Resume home diet    Disposition:  Home. Advanced Directive:    Discharge Exam:  [See today's progress note.]    CONSULTATIONS: Pulmonary/Intensive care    Significant Diagnostic Studies:   Recent Labs      06/27/18   0615  06/26/18   0119   WBC  5.1  6.0   HGB  9.5*  9.5*   HCT  30.2*  30.5*   PLT  162  152     Recent Labs      06/27/18   0615  06/26/18   0119  06/25/18   0515   NA  140  144  144   K  4.0  4.3  3.8   CL  106  108  108   CO2  30  29  29   BUN  10  8  7   CREA  0.38*  0.42*  0.41*   GLU  76  83  79   CA  8.8  8.7  8.8     Lab Results   Component Value Date/Time    Glucose (POC) 91 08/02/2012 08:18 PM     Lab Results   Component Value Date/Time    TSH 0.51 06/16/2012 04:45 AM     HOSPITAL COURSE & TREATMENT RENDERED:   1.  See today's progress note:  Daily Progress Note and Discharge Note: 6/27/2018  Doreen Beckham MD    Assessment/Plan:   Sepsis: due to UTI. Aspiration pneumonia ruled out on further study   -- urine cx with >100K Enterococcus - sensitive to Cipro/Levaquin - cont Levaquin outpt for 5 days  ---blood cultures NGTD       Hypotension: Improving; due to sepsis and bedridden status. Lactate WNR. No evidence of end-organ damage or severe sepsis.     UTI (urinary tract infection): as above. Follow urine culture     Macrocytic anemia: LFTs normal  --B12, folate ok  --transfuse <7       Seizure disorder [facility]) ():  Defer checking AED levels and continue home AEDs at current dose       Cerebral palsy [facility]): chronic dysphagia and aspiration risk. On dysphagia pureed diet at home per pt's mother. Aspiration precautions.       Underweight: BMI 12.75, but largely unchanged. Likely where he is usually is due to CP. Nutritionist consult     Functional quadriplegia in the setting of cerebral palsy as evidenced by weakness, contractures and atrophy of all extremities, being treated with supportive care of family and staff      Code Status: DNR, d/w pt's mother, who is her POA      Surrogate decision maker: mother      Problem List:  Problem List as of 6/27/2018  Date Reviewed: 6/23/2018             Codes Class Noted - Resolved     Sepsis ([facility] ICD-10-CM: A41.9  ICD-9-CM: 038.9, 995.91   7/8/2017 - Present           Aspiration pneumonia ([facility] ICD-10-CM: J69.0  ICD-9-CM: 507.0   7/8/2017 - Present           Hypotension ICD-10-CM: I95.9  ICD-9-CM: 856. 9   7/8/2017 - Present           Seizure disorder ([facility] ICD-10-CM: G40.909  ICD-9-CM: 345.90   7/8/2017 - Present           Fever ICD-10-CM: R50.9  ICD-9-CM: 780.60   8/31/2014 - Present           SIRS (systemic inflammatory response syndrome) ([facility]) ICD-10-CM: R65.10  ICD-9-CM: 995.90   8/31/2014 - Present           Cough ICD-10-CM: R05  ICD-9-CM: 786.2   8/31/2014 - Present           Sepsis(995.91) ICD-10-CM: A41.9  ICD-9-CM: 995.91   6/15/2012 - Present           UTI (urinary tract infection) ICD-10-CM: N39.0  ICD-9-CM: 599.0   6/15/2012 - Present     Overview Signed 6/17/2012  3:58 PM by Jo Mayfield       Escherichia coli    >100K col/mL               Phenobarbital toxicity ICD-10-CM: T42.3X1A  ICD-9-CM: 967.0, E980.1   6/15/2012 - Present           Cerebral palsy ([address] ICD-10-CM: G80.9  ICD-9-CM: 696. 9   6/15/2012 - Present           Scoliosis ICD-10-CM: M41.9  ICD-9-CM: 737.30   6/15/2012 - Present           Dehydration ICD-10-CM: E86.0  ICD-9-CM: 276.51   6/15/2012 - Present           Leukocytosis, unspecified ICD-10-CM: D72.829  ICD-9-CM: 288.60   6/15/2012 - Present           Encephalopathy ICD-10-CM: G93.40  ICD-9-CM: 348.30   6/15/2012 - Present                  Subjective:   Mr. Soto is a 47 y. o. male w/ hx of cerebral palsy, chronic dysphagia, seizure d/o who presents with dyspnea and lethargy. Started this morning. Constant, moderate, worsening, associated with rectal temp of 101.5. Some coughing. Pt nonverbal at baseline so history is limited to chart review and pt's mother at bedside.    ED workup showed temp 99.8, WBC 19.8K. CXR showed no acute process. UA showed severe pyuria with 3+ bacteriuria.   Mr. Soto is admitted for further evaluation and management of sepsis due to UTI. [Dr Kahlil De Santiago     6/24: Mother notes he coughed up thick mucus yesterday. Says his rattling cough is improved after this. BPs are still low, but leukocytosis is improving. Pt is more anemic today. Will follow labs. Tolerating PO.     6/25:  He looks comfortable. Nurses report no new symptoms. WBC back to normal range. Urine Culture with >100,000K Enterococcus.       6/26:  Little change. Urine culture with Enterococcus sens to Cipro - switch today and watch for 24 hours and then home if ok.       6/27: More alert and active. Afeb. WBC ok. Family wants to take him home today and stable for DC home. Cipro switched to Levaquin - cont for 5 days outpt.   Follow up with PCP later this wk or early next wk.        Review of Systems:   A comprehensive review of systems was negative except for that written in the HPI.     Objective:   Physical Exam:           Visit Vitals    /71 (BP 1 Location: Left arm, BP Patient Position: At rest)    Pulse 72    Temp 97.2 °F (36.2 °C)    Resp 16    Ht 5' 6\" (1.676 m)    Wt 45.2 kg (99 lb 10.4 oz)    SpO2 98%    BMI 16.08 kg/m2      O2 Device: Room air     Temp (24hrs), Av.9 °F (36.6 °C), Min:97.2 °F (36.2 °C), Max:98.3 °F (36.8 °C)         07 -  1900  In: 240 [P.O.:240]  Out: 2450 [Urine:2450]  General:  Alert, mentation at baseline, smiles with conversation, no distress, thin   Head:  Normocephalic, without obvious abnormality, atraumatic. Eyes:  Conjunctivae/corneas clear. PERRL, EOMs intact. Nose: Nares normal. Septum midline. Mucosa normal. No drainage or sinus tenderness. Throat: Lips, mucosa, and tongue moist..   Neck: Supple, symmetrical, trachea midline, no adenopathy, thyroid: no enlargement/tenderness/nodules, no carotid bruit and no JVD. Back:   Symmetric, no curvature. ROM normal. No CVA tenderness. Lungs:   Clear to auscultation bilaterally, transmitted upper airway sounds, diminished at bases   Chest wall:  No tenderness or deformity. Heart:  Regular rate and rhythm, S1, S2 normal, no murmur, click, rub or gallop. Abdomen:   Soft, non-tender. Bowel sounds normal. No masses,  No organomegaly. Extremities: Extremities thin and contracted, atraumatic, no cyanosis or edema. No calf tenderness or cords. Skin:  turgor normal. R ear dressed. Neurologic: Extremities contracted with CP appearance, nonverbal, alert, tracks with face.        Signed:  Tawny Bueno MD  2018  7:15 AM

## 2018-06-27 NOTE — PROGRESS NOTES
Daily Progress Note and Discharge Note: 6/27/2018  Jesu Christianson MD    Assessment/Plan:   Sepsis: due to UTI. Aspiration pneumonia ruled out on further study   -- urine cx with >100K Enterococcus - sensitive to Cipro/Levaquin - cont Levaquin outpt for 5 days  ---blood cultures NGTD       Hypotension: Improving; due to sepsis and bedridden status. Lactate WNR. No evidence of end-organ damage or severe sepsis.      UTI (urinary tract infection): as above. Follow urine culture    Macrocytic anemia: LFTs normal  --B12, folate ok  --transfuse <7       Seizure disorder ([facility]) ():  Defer checking AED levels and continue home AEDs at current dose       Cerebral palsy [facility]): chronic dysphagia and aspiration risk. On dysphagia pureed diet at home per pt's mother. Aspiration precautions.       Underweight: BMI 12.75, but largely unchanged. Likely where he is usually is due to CP.  Nutritionist consult    Functional quadriplegia in the setting of cerebral palsy as evidenced by weakness, contractures and atrophy of all extremities, being treated with supportive care of family and staff     Code Status: DNR, d/w pt's mother, who is her POA      Surrogate decision maker: mother     Problem List:  Problem List as of 6/27/2018  Date Reviewed: 6/23/2018          Codes Class Noted - Resolved    Sepsis ([facility] ICD-10-CM: A41.9  ICD-9-CM: 038.9, 995.91  7/8/2017 - Present        Aspiration pneumonia ([facility] ICD-10-CM: J69.0  ICD-9-CM: 507.0  7/8/2017 - Present        Hypotension ICD-10-CM: I95.9  ICD-9-CM: 458.9  7/8/2017 - Present        Seizure disorder ([facility] ICD-10-CM: G40.909  ICD-9-CM: 345.90  7/8/2017 - Present        Fever ICD-10-CM: R50.9  ICD-9-CM: 780.60  8/31/2014 - Present        SIRS (systemic inflammatory response syndrome) ([facility] ICD-10-CM: R65.10  ICD-9-CM: 995.90  8/31/2014 - Present        Cough ICD-10-CM: R05  ICD-9-CM: 786.2  8/31/2014 - Present        Sepsis(995.91) ICD-10-CM: A41.9  ICD-9-CM: 995.91 6/15/2012 - Present        UTI (urinary tract infection) ICD-10-CM: N39.0  ICD-9-CM: 599.0  6/15/2012 - Present    Overview Signed 6/17/2012  3:58 PM by Farheen Morning     Escherichia coli    >100K col/mL             Phenobarbital toxicity ICD-10-CM: T42.3X1A  ICD-9-CM: 967.0, E980.1  6/15/2012 - Present        Cerebral palsy ([address] ICD-10-CM: G80.9  ICD-9-CM: 343.9  6/15/2012 - Present        Scoliosis ICD-10-CM: M41.9  ICD-9-CM: 737.30  6/15/2012 - Present        Dehydration ICD-10-CM: E86.0  ICD-9-CM: 276.51  6/15/2012 - Present        Leukocytosis, unspecified ICD-10-CM: D72.829  ICD-9-CM: 288.60  6/15/2012 - Present        Encephalopathy ICD-10-CM: G93.40  ICD-9-CM: 348.30  6/15/2012 - Present              Subjective:   Mr. Desiree Lazaro is a 47 y.o. male w/ hx of cerebral palsy, chronic dysphagia, seizure d/o who presents with dyspnea and lethargy. Started this morning. Constant, moderate, worsening, associated with rectal temp of 101.5. Some coughing. Pt nonverbal at baseline so history is limited to chart review and pt's mother at bedside.    ED workup showed temp 99.8, WBC 19.8K. CXR showed no acute process. UA showed severe pyuria with 3+ bacteriuria.   Mr. Desiree Lazaro is admitted for further evaluation and management of sepsis due to UTI. [Dr Cal Degroot    6/24: Mother notes he coughed up thick mucus yesterday. Says his rattling cough is improved after this. BPs are still low, but leukocytosis is improving. Pt is more anemic today. Will follow labs. Tolerating PO.    6/25:  He looks comfortable. Nurses report no new symptoms. WBC back to normal range. Urine Culture with >100,000K Enterococcus. 6/26:  Little change. Urine culture with Enterococcus sens to Cipro - switch today and watch for 24 hours and then home if ok.      6/27: More alert and active. Afeb. WBC ok. Family wants to take him home today and stable for DC home. Cipro switched to Levaquin - cont for 5 days outpt.   Follow up with PCP later this wk or early next wk.       Review of Systems:   A comprehensive review of systems was negative except for that written in the HPI. Objective:   Physical Exam:     Visit Vitals    /71 (BP 1 Location: Left arm, BP Patient Position: At rest)    Pulse 72    Temp 97.2 °F (36.2 °C)    Resp 16    Ht 5' 6\" (1.676 m)    Wt 45.2 kg (99 lb 10.4 oz)    SpO2 98%    BMI 16.08 kg/m2      O2 Device: Room air    Temp (24hrs), Av.9 °F (36.6 °C), Min:97.2 °F (36.2 °C), Max:98.3 °F (36.8 °C)        701 -  1900  In: 240 [P.O.:240]  Out: 2450 [Urine:2450]  General:  Alert, mentation at baseline, smiles with conversation, no distress, thin   Head:  Normocephalic, without obvious abnormality, atraumatic. Eyes:  Conjunctivae/corneas clear. PERRL, EOMs intact. Nose: Nares normal. Septum midline. Mucosa normal. No drainage or sinus tenderness. Throat: Lips, mucosa, and tongue moist..   Neck: Supple, symmetrical, trachea midline, no adenopathy, thyroid: no enlargement/tenderness/nodules, no carotid bruit and no JVD. Back:   Symmetric, no curvature. ROM normal. No CVA tenderness. Lungs:   Clear to auscultation bilaterally, transmitted upper airway sounds, diminished at bases   Chest wall:  No tenderness or deformity. Heart:  Regular rate and rhythm, S1, S2 normal, no murmur, click, rub or gallop. Abdomen:   Soft, non-tender. Bowel sounds normal. No masses,  No organomegaly. Extremities: Extremities thin and contracted, atraumatic, no cyanosis or edema. No calf tenderness or cords. Skin:  turgor normal. R ear dressed. Neurologic: Extremities contracted with CP appearance, nonverbal, alert, tracks with face.        Data Review:       Recent Days:  Recent Labs      18   0615  18   0119  18   0515   WBC  5.1  6.0  6.6   HGB  9.5*  9.5*  8.7*   HCT  30.2*  30.5*  27.6*   PLT  162  152  143*     Recent Labs      18   0119  18   0515   NA  144  144   K  4.3  3.8   CL 108  108   CO2  29  29   GLU  83  79   BUN  8  7   CREA  0.42*  0.41*   CA  8.7  8.8     No results for input(s): PH, PCO2, PO2, HCO3, FIO2 in the last 72 hours. 24 Hour Results:  Recent Results (from the past 24 hour(s))   CBC WITH AUTOMATED DIFF    Collection Time: 06/27/18  6:15 AM   Result Value Ref Range    WBC 5.1 4.1 - 11.1 K/uL    RBC 2.94 (L) 4.10 - 5.70 M/uL    HGB 9.5 (L) 12.1 - 17.0 g/dL    HCT 30.2 (L) 36.6 - 50.3 %    .7 (H) 80.0 - 99.0 FL    MCH 32.3 26.0 - 34.0 PG    MCHC 31.5 30.0 - 36.5 g/dL    RDW 13.4 11.5 - 14.5 %    PLATELET 983 835 - 263 K/uL    MPV 10.3 8.9 - 12.9 FL    NRBC 0.0 0  WBC    ABSOLUTE NRBC 0.00 0.00 - 0.01 K/uL    NEUTROPHILS 71 32 - 75 %    LYMPHOCYTES 16 12 - 49 %    MONOCYTES 11 5 - 13 %    EOSINOPHILS 2 0 - 7 %    BASOPHILS 0 0 - 1 %    IMMATURE GRANULOCYTES 1 (H) 0.0 - 0.5 %    ABS. NEUTROPHILS 3.6 1.8 - 8.0 K/UL    ABS. LYMPHOCYTES 0.8 0.8 - 3.5 K/UL    ABS. MONOCYTES 0.5 0.0 - 1.0 K/UL    ABS. EOSINOPHILS 0.1 0.0 - 0.4 K/UL    ABS. BASOPHILS 0.0 0.0 - 0.1 K/UL    ABS. IMM.  GRANS. 0.1 (H) 0.00 - 0.04 K/UL    DF AUTOMATED       Medications reviewed  Current Facility-Administered Medications   Medication Dose Route Frequency    levoFLOXacin (LEVAQUIN) tablet 750 mg  750 mg Oral DAILY    mupirocin (BACTROBAN) 2 % ointment   Topical BID    sodium chloride (NS) flush 5-10 mL  5-10 mL IntraVENous PRN    sodium chloride (NS) flush 5-10 mL  5-10 mL IntraVENous Q8H    sodium chloride (NS) flush 5-10 mL  5-10 mL IntraVENous PRN    naloxone (NARCAN) injection 0.4 mg  0.4 mg IntraVENous PRN    0.9% sodium chloride infusion  100 mL/hr IntraVENous CONTINUOUS    acetaminophen (TYLENOL) tablet 650 mg  650 mg Oral Q6H PRN    aspirin delayed-release tablet 81 mg  81 mg Oral DAILY WITH DINNER    divalproex DR (DEPAKOTE) tablet 125 mg  125 mg Oral TID WITH MEALS    docusate sodium (COLACE) capsule 100 mg  100 mg Oral BID PRN    pantoprazole (PROTONIX) tablet 40 mg 40 mg Oral ACB&D    PHENobarbital (LUMINAL) tablet 32.4 mg  32.4 mg Oral BID    divalproex DR (DEPAKOTE) tablet 500 mg  500 mg Oral BID WITH MEALS    enoxaparin (LOVENOX) injection 30 mg  30 mg SubCUTAneous Q24H    ascorbic acid (vitamin C) (VITAMIN C) tablet 1,000 mg  1,000 mg Oral DAILY     Care Plan discussed with: Patient, brother and Nurse  Total time spent with patient: 30 minutes.     Ale Fernandez MD

## 2018-06-27 NOTE — PROGRESS NOTES
[facility] follow-up appointment on Monday July 2,2018 @ 9:!5 a.m. With Duane. Patient will have [address].     Added to AVS.  Adama Kelly CM Specialist

## 2018-06-29 LAB
BACTERIA SPEC CULT: NORMAL
BACTERIA SPEC CULT: NORMAL
SERVICE CMNT-IMP: NORMAL
SERVICE CMNT-IMP: NORMAL

## 2018-10-14 ENCOUNTER — HOSPITAL ENCOUNTER (EMERGENCY)
Age: 53
Discharge: HOME | End: 2018-10-14
Attending: EMERGENCY MEDICINE
Payer: MEDICARE

## 2018-10-14 ENCOUNTER — APPOINTMENT (OUTPATIENT)
Dept: GENERAL RADIOLOGY | Age: 53
End: 2018-10-14
Attending: EMERGENCY MEDICINE
Payer: MEDICARE

## 2018-10-14 VITALS
TEMPERATURE: 96.9 F | WEIGHT: 72 LBS | DIASTOLIC BLOOD PRESSURE: 67 MMHG | RESPIRATION RATE: 18 BRPM | SYSTOLIC BLOOD PRESSURE: 110 MMHG | OXYGEN SATURATION: 100 % | BODY MASS INDEX: 11.62 KG/M2 | HEART RATE: 91 BPM

## 2018-10-14 DIAGNOSIS — R50.9 FEVER, UNSPECIFIED FEVER CAUSE: Primary | ICD-10-CM

## 2018-10-14 LAB
ALBUMIN SERPL-MCNC: 3.1 G/DL (ref 3.5–5)
ALBUMIN/GLOB SERPL: 0.7 {RATIO} (ref 1.1–2.2)
ALP SERPL-CCNC: 155 U/L (ref 45–117)
ALT SERPL-CCNC: 13 U/L (ref 12–78)
AMORPH CRY URNS QL MICRO: ABNORMAL
ANION GAP SERPL CALC-SCNC: 6 MMOL/L (ref 5–15)
APPEARANCE UR: ABNORMAL
AST SERPL-CCNC: 13 U/L (ref 15–37)
BACTERIA URNS QL MICRO: NEGATIVE /HPF
BASOPHILS # BLD: 0 K/UL (ref 0–0.1)
BASOPHILS NFR BLD: 0 % (ref 0–1)
BILIRUB SERPL-MCNC: 0.2 MG/DL (ref 0.2–1)
BILIRUB UR QL: NEGATIVE
BUN SERPL-MCNC: 16 MG/DL (ref 6–20)
BUN/CREAT SERPL: 38 (ref 12–20)
CALCIUM SERPL-MCNC: 9.1 MG/DL (ref 8.5–10.1)
CHLORIDE SERPL-SCNC: 104 MMOL/L (ref 97–108)
CO2 SERPL-SCNC: 30 MMOL/L (ref 21–32)
COLOR UR: ABNORMAL
COMMENT, HOLDF: NORMAL
CREAT SERPL-MCNC: 0.42 MG/DL (ref 0.7–1.3)
DIFFERENTIAL METHOD BLD: ABNORMAL
EOSINOPHIL # BLD: 0.1 K/UL (ref 0–0.4)
EOSINOPHIL NFR BLD: 1 % (ref 0–7)
EPITH CASTS URNS QL MICRO: ABNORMAL /LPF
ERYTHROCYTE [DISTWIDTH] IN BLOOD BY AUTOMATED COUNT: 14.9 % (ref 11.5–14.5)
GLOBULIN SER CALC-MCNC: 4.5 G/DL (ref 2–4)
GLUCOSE SERPL-MCNC: 83 MG/DL (ref 65–100)
GLUCOSE UR STRIP.AUTO-MCNC: NEGATIVE MG/DL
HCT VFR BLD AUTO: 34.4 % (ref 36.6–50.3)
HGB BLD-MCNC: 10.7 G/DL (ref 12.1–17)
HGB UR QL STRIP: NEGATIVE
IMM GRANULOCYTES # BLD: 0 K/UL (ref 0–0.04)
IMM GRANULOCYTES NFR BLD AUTO: 0 % (ref 0–0.5)
KETONES UR QL STRIP.AUTO: NEGATIVE MG/DL
LACTATE BLD-SCNC: 1.3 MMOL/L (ref 0.4–2)
LEUKOCYTE ESTERASE UR QL STRIP.AUTO: NEGATIVE
LYMPHOCYTES # BLD: 1 K/UL (ref 0.8–3.5)
LYMPHOCYTES NFR BLD: 12 % (ref 12–49)
MCH RBC QN AUTO: 31.9 PG (ref 26–34)
MCHC RBC AUTO-ENTMCNC: 31.1 G/DL (ref 30–36.5)
MCV RBC AUTO: 102.7 FL (ref 80–99)
MONOCYTES # BLD: 0.9 K/UL (ref 0–1)
MONOCYTES NFR BLD: 11 % (ref 5–13)
NEUTS SEG # BLD: 6.7 K/UL (ref 1.8–8)
NEUTS SEG NFR BLD: 76 % (ref 32–75)
NITRITE UR QL STRIP.AUTO: NEGATIVE
NRBC # BLD: 0 K/UL (ref 0–0.01)
NRBC BLD-RTO: 0 PER 100 WBC
PH UR STRIP: 8.5 [PH] (ref 5–8)
PLATELET # BLD AUTO: 164 K/UL (ref 150–400)
PMV BLD AUTO: 11.4 FL (ref 8.9–12.9)
POTASSIUM SERPL-SCNC: 5 MMOL/L (ref 3.5–5.1)
PROT SERPL-MCNC: 7.6 G/DL (ref 6.4–8.2)
PROT UR STRIP-MCNC: 30 MG/DL
RBC # BLD AUTO: 3.35 M/UL (ref 4.1–5.7)
RBC #/AREA URNS HPF: ABNORMAL /HPF (ref 0–5)
SAMPLES BEING HELD,HOLD: NORMAL
SODIUM SERPL-SCNC: 140 MMOL/L (ref 136–145)
SP GR UR REFRACTOMETRY: 1.03 (ref 1–1.03)
UA: UC IF INDICATED,UAUC: ABNORMAL
UROBILINOGEN UR QL STRIP.AUTO: 0.2 EU/DL (ref 0.2–1)
WBC # BLD AUTO: 8.8 K/UL (ref 4.1–11.1)
WBC URNS QL MICRO: ABNORMAL /HPF (ref 0–4)

## 2018-10-14 PROCEDURE — 80053 COMPREHEN METABOLIC PANEL: CPT | Performed by: EMERGENCY MEDICINE

## 2018-10-14 PROCEDURE — 51701 INSERT BLADDER CATHETER: CPT

## 2018-10-14 PROCEDURE — 74018 RADEX ABDOMEN 1 VIEW: CPT

## 2018-10-14 PROCEDURE — 71045 X-RAY EXAM CHEST 1 VIEW: CPT

## 2018-10-14 PROCEDURE — 81001 URINALYSIS AUTO W/SCOPE: CPT | Performed by: EMERGENCY MEDICINE

## 2018-10-14 PROCEDURE — 99283 EMERGENCY DEPT VISIT LOW MDM: CPT

## 2018-10-14 PROCEDURE — 85025 COMPLETE CBC W/AUTO DIFF WBC: CPT | Performed by: EMERGENCY MEDICINE

## 2018-10-14 PROCEDURE — 87040 BLOOD CULTURE FOR BACTERIA: CPT | Performed by: EMERGENCY MEDICINE

## 2018-10-14 PROCEDURE — 99284 EMERGENCY DEPT VISIT MOD MDM: CPT

## 2018-10-14 PROCEDURE — 83605 ASSAY OF LACTIC ACID: CPT

## 2018-10-14 PROCEDURE — 36415 COLL VENOUS BLD VENIPUNCTURE: CPT | Performed by: EMERGENCY MEDICINE

## 2018-10-14 PROCEDURE — 77030011943

## 2018-10-14 NOTE — DISCHARGE INSTRUCTIONS
Learning About Fever  What is a fever? A fever is a high body temperature. It's one way your body fights being sick. A fever shows that the body is responding to infection or other illnesses, both minor and severe. A fever is a symptom, not an illness by itself. A fever can be a sign that you are ill, but most fevers are not caused by a serious problem. You may have a fever with a minor illness, such as a cold. But sometimes a very serious infection may cause little or no fever. It is important to look at other symptoms, other conditions you have, and how you feel in general. In children, notice how they act and see what symptoms they complain of. What is a normal body temperature? A normal body temperature is about 98. 6ºF. Some people have a normal temperature that is a little higher or a little lower than this. Your temperature may be a little lower in the morning than it is later in the day. It may go up during hot weather or when you exercise, wear heavy clothes, or take a hot bath. Your temperature may also be different depending on how you take it. A temperature taken in the mouth (oral) or under the arm may be a little lower than your core temperature (rectal). What is a fever temperature? A core temperature of 100.4°F or above is considered a fever. What can cause a fever? A fever may be caused by:  · Infections. This is the most common cause of a fever. Examples of infections that can cause a fever include the flu, a kidney infection, or pneumonia. · Some medicines. · Severe trauma or injury, such as a heart attack, stroke, heatstroke, or burns. · Other medical conditions, such as arthritis and some cancers. How can you treat a fever at home? · Ask your doctor if you can take an over-the-counter pain medicine, such as acetaminophen (Tylenol), ibuprofen (Advil, Motrin), or naproxen (Aleve). Be safe with medicines. Read and follow all instructions on the label.   · To prevent dehydration, drink plenty of fluids. Choose water and other caffeine-free clear liquids until you feel better. If you have kidney, heart, or liver disease and have to limit fluids, talk with your doctor before you increase the amount of fluids you drink. Follow-up care is a key part of your treatment and safety. Be sure to make and go to all appointments, and call your doctor if you are having problems. It's also a good idea to know your test results and keep a list of the medicines you take. Where can you learn more? Go to http://maria a-palmer.info/. Enter X878 in the search box to learn more about \"Learning About Fever. \"  Current as of: November 20, 2017  Content Version: 11.8  © 1417-3107 [employer]. Care instructions adapted under license by [employer] (which disclaims liability or warranty for this information). If you have questions about a medical condition or this instruction, always ask your healthcare professional. [address] any warranty or liability for your use of this information.

## 2018-10-14 NOTE — ED TRIAGE NOTES
Pt presents with mother for not feeling well, reports fevers as high as 101, decreased appetite, swelling in his hands and feet. Reports hx of pneumonia and UTIs.

## 2018-12-30 ENCOUNTER — HOSPITAL ENCOUNTER (INPATIENT)
Age: 53
LOS: 5 days | Discharge: HOME HEALTH | DRG: 871 | End: 2019-01-04
Attending: EMERGENCY MEDICINE | Admitting: INTERNAL MEDICINE
Payer: MEDICARE

## 2018-12-30 ENCOUNTER — APPOINTMENT (OUTPATIENT)
Dept: GENERAL RADIOLOGY | Age: 53
DRG: 871 | End: 2018-12-30
Attending: EMERGENCY MEDICINE
Payer: MEDICARE

## 2018-12-30 DIAGNOSIS — G93.41 ACUTE METABOLIC ENCEPHALOPATHY: ICD-10-CM

## 2018-12-30 DIAGNOSIS — A41.9 SEPSIS, DUE TO UNSPECIFIED ORGANISM: Primary | ICD-10-CM

## 2018-12-30 DIAGNOSIS — G40.909 SEIZURE DISORDER (HCC): ICD-10-CM

## 2018-12-30 PROBLEM — R65.20 SEVERE SEPSIS (HCC): Status: ACTIVE | Noted: 2018-12-30

## 2018-12-30 PROBLEM — L89.90 DECUBITUS ULCERS: Status: ACTIVE | Noted: 2018-12-30

## 2018-12-30 PROBLEM — M79.601 RIGHT ARM PAIN: Status: ACTIVE | Noted: 2018-12-30

## 2018-12-30 LAB
ALBUMIN SERPL-MCNC: 2.9 G/DL (ref 3.5–5)
ALBUMIN/GLOB SERPL: 0.6 {RATIO} (ref 1.1–2.2)
ALP SERPL-CCNC: 118 U/L (ref 45–117)
ALT SERPL-CCNC: 12 U/L (ref 12–78)
AMORPH CRY URNS QL MICRO: ABNORMAL
ANION GAP SERPL CALC-SCNC: 10 MMOL/L (ref 5–15)
APPEARANCE UR: CLEAR
AST SERPL-CCNC: 8 U/L (ref 15–37)
B PERT DNA SPEC QL NAA+PROBE: NOT DETECTED
BACTERIA URNS QL MICRO: NEGATIVE /HPF
BASOPHILS # BLD: 0 K/UL (ref 0–0.1)
BASOPHILS NFR BLD: 0 % (ref 0–1)
BILIRUB SERPL-MCNC: 0.2 MG/DL (ref 0.2–1)
BILIRUB UR QL: NEGATIVE
BUN SERPL-MCNC: 18 MG/DL (ref 6–20)
BUN/CREAT SERPL: 45 (ref 12–20)
C PNEUM DNA SPEC QL NAA+PROBE: NOT DETECTED
CALCIUM SERPL-MCNC: 8.9 MG/DL (ref 8.5–10.1)
CHLORIDE SERPL-SCNC: 104 MMOL/L (ref 97–108)
CO2 SERPL-SCNC: 27 MMOL/L (ref 21–32)
COLOR UR: ABNORMAL
COMMENT, HOLDF: NORMAL
CREAT SERPL-MCNC: 0.4 MG/DL (ref 0.7–1.3)
DIFFERENTIAL METHOD BLD: ABNORMAL
EOSINOPHIL # BLD: 0 K/UL (ref 0–0.4)
EOSINOPHIL NFR BLD: 0 % (ref 0–7)
EPITH CASTS URNS QL MICRO: ABNORMAL /LPF
ERYTHROCYTE [DISTWIDTH] IN BLOOD BY AUTOMATED COUNT: 13.2 % (ref 11.5–14.5)
FLUAV AG NPH QL IA: NEGATIVE
FLUAV H1 2009 PAND RNA SPEC QL NAA+PROBE: NOT DETECTED
FLUAV H1 RNA SPEC QL NAA+PROBE: NOT DETECTED
FLUAV H3 RNA SPEC QL NAA+PROBE: NOT DETECTED
FLUAV SUBTYP SPEC NAA+PROBE: NOT DETECTED
FLUBV AG NOSE QL IA: NEGATIVE
FLUBV RNA SPEC QL NAA+PROBE: NOT DETECTED
GLOBULIN SER CALC-MCNC: 4.6 G/DL (ref 2–4)
GLUCOSE SERPL-MCNC: 146 MG/DL (ref 65–100)
GLUCOSE UR STRIP.AUTO-MCNC: NEGATIVE MG/DL
HADV DNA SPEC QL NAA+PROBE: NOT DETECTED
HCOV 229E RNA SPEC QL NAA+PROBE: NOT DETECTED
HCOV HKU1 RNA SPEC QL NAA+PROBE: NOT DETECTED
HCOV NL63 RNA SPEC QL NAA+PROBE: NOT DETECTED
HCOV OC43 RNA SPEC QL NAA+PROBE: NOT DETECTED
HCT VFR BLD AUTO: 34.9 % (ref 36.6–50.3)
HGB BLD-MCNC: 11.1 G/DL (ref 12.1–17)
HGB UR QL STRIP: NEGATIVE
HMPV RNA SPEC QL NAA+PROBE: NOT DETECTED
HPIV1 RNA SPEC QL NAA+PROBE: NOT DETECTED
HPIV2 RNA SPEC QL NAA+PROBE: NOT DETECTED
HPIV3 RNA SPEC QL NAA+PROBE: NOT DETECTED
HPIV4 RNA SPEC QL NAA+PROBE: NOT DETECTED
IMM GRANULOCYTES # BLD: 0.1 K/UL (ref 0–0.04)
IMM GRANULOCYTES NFR BLD AUTO: 1 % (ref 0–0.5)
KETONES UR QL STRIP.AUTO: NEGATIVE MG/DL
LACTATE BLD-SCNC: 0.97 MMOL/L (ref 0.4–2)
LEUKOCYTE ESTERASE UR QL STRIP.AUTO: NEGATIVE
LYMPHOCYTES # BLD: 0.4 K/UL (ref 0.8–3.5)
LYMPHOCYTES NFR BLD: 3 % (ref 12–49)
M PNEUMO DNA SPEC QL NAA+PROBE: NOT DETECTED
MCH RBC QN AUTO: 32.8 PG (ref 26–34)
MCHC RBC AUTO-ENTMCNC: 31.8 G/DL (ref 30–36.5)
MCV RBC AUTO: 103.3 FL (ref 80–99)
MONOCYTES # BLD: 1.2 K/UL (ref 0–1)
MONOCYTES NFR BLD: 10 % (ref 5–13)
MUCOUS THREADS URNS QL MICRO: ABNORMAL /LPF
NEUTS SEG # BLD: 10.4 K/UL (ref 1.8–8)
NEUTS SEG NFR BLD: 86 % (ref 32–75)
NITRITE UR QL STRIP.AUTO: NEGATIVE
NRBC # BLD: 0 K/UL (ref 0–0.01)
NRBC BLD-RTO: 0 PER 100 WBC
PH UR STRIP: 8 [PH] (ref 5–8)
PLATELET # BLD AUTO: 202 K/UL (ref 150–400)
PMV BLD AUTO: 11.4 FL (ref 8.9–12.9)
POTASSIUM SERPL-SCNC: 3.7 MMOL/L (ref 3.5–5.1)
PROT SERPL-MCNC: 7.5 G/DL (ref 6.4–8.2)
PROT UR STRIP-MCNC: NEGATIVE MG/DL
RBC # BLD AUTO: 3.38 M/UL (ref 4.1–5.7)
RBC #/AREA URNS HPF: ABNORMAL /HPF (ref 0–5)
RBC MORPH BLD: ABNORMAL
RSV RNA SPEC QL NAA+PROBE: NOT DETECTED
RV+EV RNA SPEC QL NAA+PROBE: NOT DETECTED
SAMPLES BEING HELD,HOLD: NORMAL
SODIUM SERPL-SCNC: 141 MMOL/L (ref 136–145)
SP GR UR REFRACTOMETRY: 1.02 (ref 1–1.03)
UR CULT HOLD, URHOLD: NORMAL
UROBILINOGEN UR QL STRIP.AUTO: 0.2 EU/DL (ref 0.2–1)
VALPROATE SERPL-MCNC: 112 UG/ML (ref 50–100)
WBC # BLD AUTO: 12.1 K/UL (ref 4.1–11.1)
WBC URNS QL MICRO: ABNORMAL /HPF (ref 0–4)

## 2018-12-30 PROCEDURE — 71045 X-RAY EXAM CHEST 1 VIEW: CPT

## 2018-12-30 PROCEDURE — 3E03329 INTRODUCTION OF OTHER ANTI-INFECTIVE INTO PERIPHERAL VEIN, PERCUTANEOUS APPROACH: ICD-10-PCS | Performed by: INTERNAL MEDICINE

## 2018-12-30 PROCEDURE — 73090 X-RAY EXAM OF FOREARM: CPT

## 2018-12-30 PROCEDURE — 36415 COLL VENOUS BLD VENIPUNCTURE: CPT

## 2018-12-30 PROCEDURE — 77030011943

## 2018-12-30 PROCEDURE — 93005 ELECTROCARDIOGRAM TRACING: CPT

## 2018-12-30 PROCEDURE — 77030011256 HC DRSG MEPILEX <16IN NO BORD MOLN -A

## 2018-12-30 PROCEDURE — 99285 EMERGENCY DEPT VISIT HI MDM: CPT

## 2018-12-30 PROCEDURE — 85025 COMPLETE CBC W/AUTO DIFF WBC: CPT

## 2018-12-30 PROCEDURE — 81001 URINALYSIS AUTO W/SCOPE: CPT

## 2018-12-30 PROCEDURE — 65660000000 HC RM CCU STEPDOWN

## 2018-12-30 PROCEDURE — 80053 COMPREHEN METABOLIC PANEL: CPT

## 2018-12-30 PROCEDURE — 77030034850

## 2018-12-30 PROCEDURE — 74011000258 HC RX REV CODE- 258: Performed by: INTERNAL MEDICINE

## 2018-12-30 PROCEDURE — 96365 THER/PROPH/DIAG IV INF INIT: CPT

## 2018-12-30 PROCEDURE — 87633 RESP VIRUS 12-25 TARGETS: CPT

## 2018-12-30 PROCEDURE — 51701 INSERT BLADDER CATHETER: CPT

## 2018-12-30 PROCEDURE — 74011250636 HC RX REV CODE- 250/636: Performed by: EMERGENCY MEDICINE

## 2018-12-30 PROCEDURE — 87804 INFLUENZA ASSAY W/OPTIC: CPT

## 2018-12-30 PROCEDURE — 74011000258 HC RX REV CODE- 258: Performed by: EMERGENCY MEDICINE

## 2018-12-30 PROCEDURE — 80164 ASSAY DIPROPYLACETIC ACD TOT: CPT

## 2018-12-30 PROCEDURE — 73060 X-RAY EXAM OF HUMERUS: CPT

## 2018-12-30 PROCEDURE — 87040 BLOOD CULTURE FOR BACTERIA: CPT

## 2018-12-30 PROCEDURE — 74011250636 HC RX REV CODE- 250/636: Performed by: INTERNAL MEDICINE

## 2018-12-30 PROCEDURE — 83605 ASSAY OF LACTIC ACID: CPT

## 2018-12-30 PROCEDURE — 74011250637 HC RX REV CODE- 250/637: Performed by: INTERNAL MEDICINE

## 2018-12-30 RX ORDER — ONDANSETRON 2 MG/ML
4 INJECTION INTRAMUSCULAR; INTRAVENOUS
Status: DISCONTINUED | OUTPATIENT
Start: 2018-12-30 | End: 2019-01-04 | Stop reason: HOSPADM

## 2018-12-30 RX ORDER — ENOXAPARIN SODIUM 100 MG/ML
30 INJECTION SUBCUTANEOUS EVERY 24 HOURS
Status: DISCONTINUED | OUTPATIENT
Start: 2018-12-30 | End: 2019-01-04 | Stop reason: HOSPADM

## 2018-12-30 RX ORDER — NALOXONE HYDROCHLORIDE 0.4 MG/ML
0.4 INJECTION, SOLUTION INTRAMUSCULAR; INTRAVENOUS; SUBCUTANEOUS AS NEEDED
Status: DISCONTINUED | OUTPATIENT
Start: 2018-12-30 | End: 2019-01-04 | Stop reason: HOSPADM

## 2018-12-30 RX ORDER — DIVALPROEX SODIUM 250 MG/1
250 TABLET, DELAYED RELEASE ORAL
Status: DISCONTINUED | OUTPATIENT
Start: 2018-12-30 | End: 2018-12-30

## 2018-12-30 RX ORDER — SODIUM CHLORIDE 0.9 % (FLUSH) 0.9 %
5-10 SYRINGE (ML) INJECTION AS NEEDED
Status: DISCONTINUED | OUTPATIENT
Start: 2018-12-30 | End: 2019-01-04 | Stop reason: HOSPADM

## 2018-12-30 RX ORDER — DOCUSATE SODIUM 100 MG/1
100 CAPSULE, LIQUID FILLED ORAL 2 TIMES DAILY
Status: DISCONTINUED | OUTPATIENT
Start: 2018-12-30 | End: 2019-01-04 | Stop reason: HOSPADM

## 2018-12-30 RX ORDER — DIVALPROEX SODIUM 250 MG/1
500 TABLET, DELAYED RELEASE ORAL 2 TIMES DAILY
Status: DISCONTINUED | OUTPATIENT
Start: 2018-12-30 | End: 2018-12-30

## 2018-12-30 RX ORDER — DIVALPROEX SODIUM 125 MG/1
250 TABLET, DELAYED RELEASE ORAL
COMMUNITY
End: 2020-02-24 | Stop reason: DRUGHIGH

## 2018-12-30 RX ORDER — PHENOBARBITAL SODIUM 65 MG/ML
30 INJECTION INTRAMUSCULAR EVERY 12 HOURS
Status: DISCONTINUED | OUTPATIENT
Start: 2018-12-30 | End: 2019-01-04 | Stop reason: HOSPADM

## 2018-12-30 RX ORDER — IPRATROPIUM BROMIDE AND ALBUTEROL SULFATE 2.5; .5 MG/3ML; MG/3ML
3 SOLUTION RESPIRATORY (INHALATION)
Status: DISCONTINUED | OUTPATIENT
Start: 2018-12-30 | End: 2019-01-04 | Stop reason: HOSPADM

## 2018-12-30 RX ORDER — DIVALPROEX SODIUM 125 MG/1
125 TABLET, DELAYED RELEASE ORAL 3 TIMES DAILY
Status: DISCONTINUED | OUTPATIENT
Start: 2018-12-30 | End: 2018-12-30

## 2018-12-30 RX ORDER — HYDROMORPHONE HYDROCHLORIDE 1 MG/ML
0.2 INJECTION, SOLUTION INTRAMUSCULAR; INTRAVENOUS; SUBCUTANEOUS
Status: DISCONTINUED | OUTPATIENT
Start: 2018-12-30 | End: 2019-01-04 | Stop reason: HOSPADM

## 2018-12-30 RX ORDER — DIPHENHYDRAMINE HYDROCHLORIDE 50 MG/ML
12.5 INJECTION, SOLUTION INTRAMUSCULAR; INTRAVENOUS
Status: DISCONTINUED | OUTPATIENT
Start: 2018-12-30 | End: 2019-01-04 | Stop reason: HOSPADM

## 2018-12-30 RX ORDER — ACETAMINOPHEN 650 MG/1
650 SUPPOSITORY RECTAL
Status: DISCONTINUED | OUTPATIENT
Start: 2018-12-30 | End: 2019-01-04 | Stop reason: HOSPADM

## 2018-12-30 RX ORDER — METRONIDAZOLE 500 MG/100ML
500 INJECTION, SOLUTION INTRAVENOUS EVERY 12 HOURS
Status: DISCONTINUED | OUTPATIENT
Start: 2018-12-30 | End: 2019-01-03

## 2018-12-30 RX ORDER — DEXTROSE, SODIUM CHLORIDE, AND POTASSIUM CHLORIDE 5; .45; .15 G/100ML; G/100ML; G/100ML
100 INJECTION INTRAVENOUS CONTINUOUS
Status: DISCONTINUED | OUTPATIENT
Start: 2018-12-30 | End: 2019-01-01

## 2018-12-30 RX ORDER — SODIUM CHLORIDE 0.9 % (FLUSH) 0.9 %
5-10 SYRINGE (ML) INJECTION EVERY 8 HOURS
Status: DISCONTINUED | OUTPATIENT
Start: 2018-12-30 | End: 2019-01-04 | Stop reason: HOSPADM

## 2018-12-30 RX ORDER — DIVALPROEX SODIUM 125 MG/1
125 TABLET, DELAYED RELEASE ORAL DAILY
Status: DISCONTINUED | OUTPATIENT
Start: 2018-12-31 | End: 2018-12-30

## 2018-12-30 RX ORDER — LEVOFLOXACIN 5 MG/ML
750 INJECTION, SOLUTION INTRAVENOUS
Status: COMPLETED | OUTPATIENT
Start: 2018-12-30 | End: 2018-12-30

## 2018-12-30 RX ADMIN — ACETAMINOPHEN 650 MG: 650 SUPPOSITORY RECTAL at 18:57

## 2018-12-30 RX ADMIN — Medication 10 ML: at 23:02

## 2018-12-30 RX ADMIN — SODIUM CHLORIDE 20 ML: 900 INJECTION, SOLUTION INTRAVENOUS at 14:36

## 2018-12-30 RX ADMIN — METRONIDAZOLE 500 MG: 500 INJECTION, SOLUTION INTRAVENOUS at 21:22

## 2018-12-30 RX ADMIN — HYDROMORPHONE HYDROCHLORIDE 0.2 MG: 1 INJECTION, SOLUTION INTRAMUSCULAR; INTRAVENOUS; SUBCUTANEOUS at 22:56

## 2018-12-30 RX ADMIN — VANCOMYCIN HYDROCHLORIDE 750 MG: 10 INJECTION, POWDER, LYOPHILIZED, FOR SOLUTION INTRAVENOUS at 15:19

## 2018-12-30 RX ADMIN — DEXTROSE MONOHYDRATE, SODIUM CHLORIDE, AND POTASSIUM CHLORIDE 100 ML/HR: 50; 4.5; 1.49 INJECTION, SOLUTION INTRAVENOUS at 18:12

## 2018-12-30 RX ADMIN — LEVOFLOXACIN 750 MG: 5 INJECTION, SOLUTION INTRAVENOUS at 14:34

## 2018-12-30 RX ADMIN — SODIUM CHLORIDE 1000 ML: 900 INJECTION, SOLUTION INTRAVENOUS at 13:13

## 2018-12-30 RX ADMIN — ENOXAPARIN SODIUM 30 MG: 100 INJECTION SUBCUTANEOUS at 18:15

## 2018-12-30 RX ADMIN — PHENOBARBITAL SODIUM 30 MG: 65 INJECTION INTRAMUSCULAR; INTRAVENOUS at 21:32

## 2018-12-30 RX ADMIN — CEFEPIME HYDROCHLORIDE 2 G: 2 INJECTION, POWDER, FOR SOLUTION INTRAVENOUS at 22:47

## 2018-12-30 RX ADMIN — Medication 10 ML: at 18:15

## 2018-12-30 RX ADMIN — PIPERACILLIN SODIUM,TAZOBACTAM SODIUM 3.38 G: 3; .375 INJECTION, POWDER, FOR SOLUTION INTRAVENOUS at 13:55

## 2018-12-30 NOTE — ED NOTES
Pt turned and repositioned for comfort. Attends changed. Pt had wet attends changed. Skin care provided.

## 2018-12-30 NOTE — PROGRESS NOTES
[facility]: MED RECONCILIATION Comments/Recommendations: ? Medication reconciliation completed by mother who was knowledgeable regarding medications. ? Patient took AM medications today. ? Discussed allergy, antibiotic many years ago caused rash, has had many antibiotics since then and never had an issue. ? Updated preferred pharmacy to [facility] on [email or web address]. Medications added:  
 
· none Medications removed: · Levaquin · mupirocin Medications adjusted: · Depakote ER 125mg + depakote ER 500mg in the AM 
· Depakote ER 250mg (two 125mg tabs) + depakote ER 500mg daily with supper Information obtained from: mother, Rx query Allergies: Other medication Prior to Admission Medications:  
 
Medication Documentation Review Audit Reviewed by Daria Ponce (Pharmacist) on 12/30/18 at [phone] Medication Sig Documenting Provider Last Dose Status Taking?  
acetaminophen (TYLENOL) 500 mg tablet Take 1,000 mg by mouth every six (6) hours as needed for Pain. Provider, Historical 12/30/2018 AM Active Yes  
ascorbic acid, vitamin C, powd Take 1,000 mg by mouth daily. Mix with liquid prior to administration. Provider, Historical 12/30/2018 AM Active Yes  
aspirin delayed-release 81 mg tablet Take 81 mg by mouth daily (with dinner). Provider, Historical 12/29/2018 PM Active Yes  
divalproex DR (DEPAKOTE) 125 mg tablet Take 125 mg by mouth daily. Takes with 500mg in the morning. Provider, Historical 12/29/2018 AM Active Yes  
divalproex DR (DEPAKOTE) 125 mg tablet Take 250 mg by mouth daily (with dinner). Takes with 500mg at dinner. Provider, Historical 12/29/2018 PM Active Yes  
divalproex DR (DEPAKOTE) 500 mg tablet Take 1 Tab by mouth two (2) times a day. Fili Davis NP 12/30/2018 AM Active Yes DOCUSATE CALCIUM (STOOL SOFTENER PO) Take 1 tablet by mouth two (2) times daily as needed. Provider, Historical  Active Yes Med Note Inga Yarbroughguillermo   Sat Jul 8, 2017  4:38 PM)    
omeprazole (PRILOSEC) 20 mg capsule Take 20 mg by mouth Before breakfast and dinner. Provider, Historical 12/30/2018 AM Active Yes PHENobarbital (LUMINAL) 30 mg tablet Take 30 mg by mouth two (2) times a day. Provider, Historical 12/30/2018 AM Active Yes Thank you, Irene Allen, PharmD, BCPS  Contact: [phone]

## 2018-12-30 NOTE — ED PROVIDER NOTES
47 y.o. male with past medical history significant for cerebral palsy, seizures, aspiration pneumonia, dysphagia, and scoliosis who presents via EMS from home accompanied by his mother with chief complaint of a fever. Per EMS, patient arrives for a fever (max. T-103.2) and generalized weakness. En route, BP 96/68, . No signs of trauma or abnormal mental status. Per patient's mother, patient's health has been \"headed downhill\" for quite a while. Patient began to appear \"sluggish\" last night - notes he is usually much more responsive to stimuli. This morning, she measured patient's temperature at T-99.1 and administered tylenol. She rechecked his temperature at T-102.8 several hours later and called EMS. Prior to EMS arrival, patient's mother rechecked his temperature at T-103.2 (rectal). Patient has also exhibited worsening cough, increased weight loss, and multiple new bed sores. There are no other acute medical concerns at this time. Social hx: Never tobacco smoker; Denies EtOH use; Denies illicit drug use; Lives at home with brother PCP: Shae Vallecillo MD 
 
Note written by Miranda Villarreal, as dictated by Jair Mcfarland MD 1:11 PM 
 
 
 
  
 
Past Medical History:  
Diagnosis Date  Aspiration pneumonia ([address]  Cerebral palsy ([address]  Ill-defined condition   
 dsyphasia  Scoliosis  Seizure disorder ([address]  Seizures ([address] Past Surgical History:  
Procedure Laterality Date  HX HEENT Family History:  
Problem Relation Age of Onset  Cancer Father   
     prostate  Cancer Maternal Aunt  Heart Disease Maternal Aunt  Cancer Maternal Grandmother  Cancer Maternal Grandfather  Cancer Paternal Grandmother  Cancer Paternal Grandfather  Cancer Other Social History Socioeconomic History  Marital status: SINGLE Spouse name: Not on file  Number of children: Not on file  Years of education: Not on file  Highest education level: Not on file Social Needs  Financial resource strain: Not on file  Food insecurity - worry: Not on file  Food insecurity - inability: Not on file  Transportation needs - medical: Not on file  Transportation needs - non-medical: Not on file Occupational History  Not on file Tobacco Use  Smoking status: Never Smoker  Smokeless tobacco: Never Used Substance and Sexual Activity  Alcohol use: No  
 Drug use: No  
 Sexual activity: Not on file Other Topics Concern  Not on file Social History Narrative  Not on file ALLERGIES: Other medication Review of Systems Constitutional: Positive for activity change (\"sluggish\"), fever (max. T-103.2) and unexpected weight change (unintentional weight loss). HENT: Positive for trouble swallowing (chronic). Respiratory: Positive for cough. Skin: Positive for wound (bed sores). All other systems reviewed and are negative. Vitals:  
 12/30/18 1307 BP: 106/73 Pulse: (!) 125 Resp: 15 SpO2: 96% Weight: 34 kg (75 lb) Physical Exam  
Constitutional: He is oriented to person, place, and time. He appears well-developed and well-nourished. Moderately ill appearing (d/t cerebral palsy). HENT:  
Head: Normocephalic and atraumatic. Eyes: Conjunctivae are normal. No scleral icterus. Neck: Neck supple. No tracheal deviation present. Cardiovascular: Regular rhythm, normal heart sounds and intact distal pulses. Tachycardia present. Exam reveals no gallop and no friction rub. No murmur heard. Pulmonary/Chest: Effort normal and breath sounds normal. He has no wheezes. He has no rales. Coarse bilateral breath sounds. Abdominal: Soft. He exhibits no distension. There is no tenderness. There is no rebound and no guarding. Musculoskeletal: He exhibits no edema. Neurological: He is alert and oriented to person, place, and time. Skin: Skin is warm and dry. No rash noted. Psychiatric: He has a normal mood and affect. Nursing note and vitals reviewed. Note written by Miranda Felix, as dictated by Lucero Espinosa MD 1:11 PM  
 
MDM Procedures ED EKG interpretation: 
Rhythm: sinus tachycardia; and regular . Rate (approx.): 126; ST/T wave: non-specific changes. Note written by Miranda Felix, as dictated by Lucero Espinosa MD 1:11 PM 
 
PROGRESS NOTE: 
2:38 PM 
Spoke with Dr. Tommy Martínez.  He will admit. Total critical care time spent exclusive of procedures:  35 min. Kamron Sanchez MD 
2:49 PM 
 
Repeat Assessment (Within 6 hours of septic shock presentation):  
Repeat Focused Exam 
 VS: 107     13     97/77     100% Heart/Lung Eval: tachy RR, course BS Capillary Refill: < 2 sec Peripheral Pulse: 2+ Skin exam (color/turgor):  normal 
 
 
Repeat Lactate:  Not indicated Kamron Sanchez MD 
2:51 PM 
 
A/P: sepsis - unclear source; getting fluids and broad-spectrum AB's; admit for further management.   Kamron Sanchez MD 
2:52 PM

## 2018-12-30 NOTE — ED TRIAGE NOTES
Pt arrives via EMS for decreased LOC, hypotension, tachycardia, and increased temperature. Pt with hx of CP and hx of urosepsis and aspiration pneumonia. Pt with dysphasia.

## 2018-12-30 NOTE — PROGRESS NOTES
SHIFT CHANGE: 
Report received from Ana Paula Rainey RN. SBAR, Kardex, Procedure Summary, Intake/Output, MAR, Recent Results, Med Rec Status and Cardiac Rhythm NSR/ST were discussed. SHIFT SUMMARY: 
3:57 PM  Clarified PO Depakote orders with Dr. Carroll Mike.  Orders to hold for today. 6:57 PM  PRN Tylenol suppository given. Mother states he seems uncomfortable. END OF SHIFT REPORT: 
7:42 PM Bedside and Verbal shift change report given to Lianne Wray RN (oncoming nurse) by Sandro Tyler RN (offgoing nurse). Report included the following information SBAR, Kardex, Procedure Summary, Intake/Output, MAR, Recent Results, Med Rec Status and Cardiac Rhythm NSR/ST.

## 2018-12-30 NOTE — PROGRESS NOTES
[address] Dosing Services: Antimicrobial Stewardship Progress Note 12/30/2018 Consult for antibiotic dosing of Vancomycin, Cefepime, Metronidazole by Dr. Mily Dent Pharmacist reviewed antibiotic appropriateness for 47year old , male  for indication of Sepsis, recurrent aspiration pneumonia in the setting of cerebral palsy, functional quadriplegia Day of Therapy 1 Plan: 
Vancomycin therapy: 
Patient received a 750 mg loading dose in the ED (~22 mg/kg), patient with very low body weight Will not order a maintenance dose initially Dose calculated to approximate a therapeutic trough of 15-20 mcg/mL. Last trough level / Plan for level: No history of previous vancomycin trough, estimated CrCl is ~25 ml/min based on functional quadriplegia so will not order a maintenance dose at this time. Random 24 hour vancomycin level ordered to evaluate if patient is clearing. Pharmacy to evaluate daily and order a maintenance dose as indicated. Pharmacy to follow daily and will make changes to dose and/or frequency based on clinical status. - TMAX 101.2, hypotensive with BP 75/57, mild leukocytosis, Scr 0.4 Non-Kinetic Antimicrobial Dosing:  
Current Regimen: Cefepime- Pharmacy to dose Recommendation: Patient with very low body weight and estimated CrCl to be ~25 ml/min based on functional quadriplegia calculation. Will order cefepime 2 grams Q24 hours as appropriate for estimated CrCl <30 ml/min Non-Kinetic Antimicrobial Dosing:  
Current Regimen:  Metronidazole- Pharmacy to dose Recommendation: Metronidazole 500 mg Q12 hours per protocol (dosing not based on renal function) Will start cefepime and metronidazole in 6 hours (one dosing interval after zosyn given) Daily Scr per protocol Other Antimicrobial 
(not dosed by pharmacist) Levaquin/zosyn/vancomycin x 1 in the ED (12/30) Cultures 12/30 Blood: Pending 12/30 Sputum: Pending 12/30 Respiratory virus panel: Pending Serum Creatinine Lab Results Component Value Date/Time Creatinine 0.40 (L) 12/30/2018 01:14 PM  
 Creatinine (POC) 0.4 (L) 08/02/2012 08:18 PM  
   
Creatinine Clearance CrCl cannot be calculated (Unknown ideal weight.). Temp  
100.1 °F (37.8 °C) WBC Lab Results Component Value Date/Time WBC 12.1 (H) 12/30/2018 01:14 PM  
   
H/H Lab Results Component Value Date/Time HGB 11.1 (L) 12/30/2018 01:14 PM  
  
 
Platelets Lab Results Component Value Date/Time PLATELET 862 20/18/7696 01:14 PM  
  
 
Pharmacist: Natalya Leahy PHARMD Contact information: [phone]

## 2018-12-30 NOTE — ACP (ADVANCE CARE PLANNING)
Advance Care Planning (ACP) Provider Note - Comprehensive     Date of ACP Conversation: 12/30/18  Persons included in Conversation:  patient and family  Length of ACP Conversation in minutes:  16 minutes    Authorized Decision Maker (if patient is incapable of making informed decisions): This person is:   Mother          General ACP for ALL Patients with Decision Making Capacity:   Exploration of values, goals, and preferences if recovery is not expected, even with continued medical treatment in the event of: Imminent death    Review of Existing Advance Directive:  patient is DNR, but mother declined hospice or PEG    For Serious or Chronic Illness:  Understanding of medical condition      Interventions Provided:  Recommended communicating the plan and making copies for the healthcare agent, personal physician, and others as appropriate (e.g., health system)

## 2018-12-30 NOTE — H&P
[address], [id number] [place] [address] 
[phone] Admission History and Physical 
 
 
NAME:  Donnie Carbajal :   1965 MRN:  [id number] PCP:  Lupis Wolfe MD  
 
Date/Time:  2018 Subjective: CHIEF COMPLAINT: AMS, fevers HISTORY OF PRESENT ILLNESS:    
The patient is a 46 yo hx of cerebral palsy, functional quadriplegia, dysphagia, seizure d/o, pressure ulcers, presented w/ severe sepsis, recurrent aspiration pneumonia, AMS. The patient cannot give a history. His mother (POA), stated that the patient has been less \"responsive\" over the past 2 days, associated with high fevers. She noticed that he has been having more difficulty eating and drinking. Per [facility], the patient has a hx of multiple admissions for aspiration pneumonia. In the ED, WBC was 12.1, temp 101.2. Allergies Allergen Reactions  Other Medication Swelling Mother states the patient had a reaction to an antibiotic as a child She cannot recall the name of the antibiotic nor the indication Per [facility] [phone] the patient has had prescriptions for Augmentin Tolerates Cefdinir. Prior to Admission medications Medication Sig Start Date End Date Taking? Authorizing Provider  
levoFLOXacin (LEVAQUIN) 750 mg tablet Take 1 Tab by mouth daily. 18   Yisel Sexton MD  
mupirocin [facility]) 2 % ointment Apply  to affected area two (2) times a day. Apply to ear 18   Yisel Sexton MD  
aspirin delayed-release 81 mg tablet Take 81 mg by mouth daily (with dinner). Provider, Historical  
ascorbic acid, vitamin C, powd Take 1,000 mg by mouth daily. Mix with liquid prior to administration. Provider, Historical  
omeprazole (PRILOSEC) 20 mg capsule Take 20 mg by mouth Before breakfast and dinner. Provider, Historical  
PHENobarbital (LUMINAL) 30 mg tablet Take 30 mg by mouth two (2) times a day.     Provider, Historical  
 divalproex DR (DEPAKOTE) 125 mg tablet Take 125 mg by mouth three (3) times daily. Provider, Historical  
acetaminophen (TYLENOL) 500 mg tablet Take 1,000 mg by mouth every six (6) hours as needed for Pain. Provider, Historical  
DOCUSATE CALCIUM (STOOL SOFTENER PO) Take 1 tablet by mouth two (2) times daily as needed. Provider, Historical  
divalproex DR (DEPAKOTE) 500 mg tablet Take 1 Tab by mouth two (2) times a day. 4/23/14   Smith Hinojosa NP Past Medical History:  
Diagnosis Date  Aspiration pneumonia ([address]  Cerebral [facility] [address]  Ill-defined condition   
 dsyphasia  Scoliosis  Seizure disorder ([facility]  Seizures ([facility] Past Surgical History:  
Procedure Laterality Date  HX HEENT Social History Tobacco Use  Smoking status: Never Smoker  Smokeless tobacco: Never Used Substance Use Topics  Alcohol use: No  
  
 
Family History Problem Relation Age of Onset  Cancer Father   
     prostate  Cancer Maternal Aunt  Heart Disease Maternal Aunt  Cancer Maternal Grandmother  Cancer Maternal Grandfather  Cancer Paternal Grandmother  Cancer Paternal Grandfather  Cancer Other Review of Systems: (unable to obtain, patient is non-verbal) (bold if positive, if negative) Gen:  Eyes:  ENT:  CVS:  Pulm:  GI:   
:   
MS:  Skin:  Psych:  Endo:   
Hem:  Renal:   
Neuro:    
 
  
Objective: VITALS:   
Vital signs reviewed; most recent are: 
 
Visit Vitals BP 99/84 Pulse (!) 113 Temp (!) 101.2 °F (38.4 °C) Resp 19 Wt 34 kg (75 lb) SpO2 100% BMI 12.11 kg/m² SpO2 Readings from Last 6 Encounters:  
12/30/18 100% 10/14/18 100% 06/27/18 100% 08/05/17 97% 07/16/17 95% 03/10/16 100% No intake or output data in the 24 hours ending 12/30/18 1421 Exam:  
 
Physical Exam: 
 
Gen:  Disheveled, cachectic, ill-appearing, mild distress HEENT:  Pink conjunctivae, PERRL, hearing intact to voice, dry mucous membranes Neck:  Supple, without masses, thyroid non-tender Resp:  No accessory muscle use, bilateral coarse BS at bases Card:  No murmurs, normal S1, S2 without thrills, Abd:  Soft, non-tender, non-distended, normoactive bowel sounds are present Lymph:  No cervical adenopathy Musc:  Contracted, UE pulses 2+, cap refills <2sec Skin:  Multiple decub ulcers, right elbow ulcer, heel ulcer Neuro:  Does not follow commands Psych:  Awake, no insight Labs: 
 
Recent Labs 12/30/18 
1314 WBC 12.1* HGB 11.1*  
HCT 34.9*  
 Recent Labs 12/30/18 
1314   
K 3.7  CO2 27 * BUN 18 CREA 0.40* CA 8.9 ALB 2.9* TBILI 0.2 SGOT 8* ALT 12 Lab Results Component Value Date/Time Glucose (POC) 91 08/02/2012 08:18 PM  
 
No results for input(s): PH, PCO2, PO2, HCO3, FIO2 in the last 72 hours. No results for input(s): INR in the last 72 hours. No lab exists for component: INREXT Radiology and EKG reviewed:   pending **Old Records reviewed in [address] [place] [address]** Assessment/Plan:   
  
Principal Problem: 
 
46 yo hx of cerebral palsy, functional quadriplegia, dysphagia, seizure d/o, pressure ulcers, presented w/ severe sepsis, recurrent aspiration pneumonia, AMS 1) Severe sepsis/recurrent aspiration pneumonia: due to dysphagia, cerebral palsy. Patient's mom (POA), is not interested in PEG tube or hospice. Will check blood Cx, lactate. Keep NPO for now. Start IVF, IV Vanc/cefepime/flagyl 2) Acute metabolic encephalopathy: more lethargic than baseline. Due to above. Will recheck Valproic acid level. Monitor closely 3) Seizure d/o: will check valproic acid, resume depakote if able to take PO. Switch phenobarb to IV 4) R arm pain/contracture: likely chronic. Family said that pain is worse. Will xray R arm, shoulder. Consult Ortho, PT/OT 5) Cerebral palsy/functional quad: cont supportive care 6) Severe pro-jesus malnutrition: consult nutrition . Family declined PEG tube 7) Pressure ulcers: POA. Has decub ulcers, elbow and heel ulcers. Will consult wound care Code: DNR - discussed with mother (POA). Not interested in Hospice Risk of deterioration: high Total time spent with patient: 70 Minutes Care Plan discussed with: Patient, family, nursing. I signed out to Dr Hui Negro, who will resume care of patient Discussed:  Care Plan Prophylaxis:  Lovenox Probable Disposition:   PT, OT, RN 
        
___________________________________________________ Attending Physician: Celina Wells MD

## 2018-12-30 NOTE — ED NOTES
Verbal report given to Rusty Vick RN(name) on Lizzette being transferred to PCC(unit) for routine progression of care. Report consisted of patient's Situation, Background, Assessment and Recommendations (SBAR) Information from the following report(s)  SBAR, ED Summary, Procedure Summary, Intake/Output, MAR, Recent Results, Med Rec Status and Cardiac Rhythm ST. was reviewed with the receiving nurse. Opportunity for questions and clarification was provided. Patient transported with:  Monitor Registered Nurse Last Filed Values: 
Temp: 100.1 °F (37.8 °C) (12/30/18 1555) Pulse (Heart Rate): 92 (12/30/18 1545) Resp Rate: 12 (12/30/18 1545) O2 Sat (%): 98 % (12/30/18 1545) BP: (!) 75/57 (12/30/18 1545) MAP (Monitor): (!) 61 (12/30/18 1545) MAP (Calculated): 84 (12/30/18 1307) Level of Consciousness: Responds to Voice (12/30/18 1555) Lab Results Component Value Date/Time WBC 12.1 (H) 12/30/2018 01:14 PM  
 
 
Repeat LA:  Time Due N/A Blood Cultures Drawn:  yes Fluid Resuscitation:  Total needed 1020mL, Status completed, amount 1020 mL All Antibiotics Started:  yes, Dose Due n/a 
 
VS x 2 post-fluid resuscitation:   no ; 1 complete Vasopressor Infusion:  no  
 
Provider Reassessment needed and notified:  yes , Due Additional Interventions/Comments:

## 2018-12-31 LAB
ALBUMIN SERPL-MCNC: 2.4 G/DL (ref 3.5–5)
ALBUMIN/GLOB SERPL: 0.6 {RATIO} (ref 1.1–2.2)
ALP SERPL-CCNC: 103 U/L (ref 45–117)
ALT SERPL-CCNC: 12 U/L (ref 12–78)
ANION GAP SERPL CALC-SCNC: 7 MMOL/L (ref 5–15)
AST SERPL-CCNC: 8 U/L (ref 15–37)
BILIRUB DIRECT SERPL-MCNC: 0.1 MG/DL (ref 0–0.2)
BILIRUB SERPL-MCNC: 0.3 MG/DL (ref 0.2–1)
BUN SERPL-MCNC: 11 MG/DL (ref 6–20)
BUN/CREAT SERPL: 32 (ref 12–20)
CALCIUM SERPL-MCNC: 8.7 MG/DL (ref 8.5–10.1)
CHLORIDE SERPL-SCNC: 106 MMOL/L (ref 97–108)
CK SERPL-CCNC: 22 U/L (ref 39–308)
CO2 SERPL-SCNC: 26 MMOL/L (ref 21–32)
CREAT SERPL-MCNC: 0.34 MG/DL (ref 0.7–1.3)
ERYTHROCYTE [DISTWIDTH] IN BLOOD BY AUTOMATED COUNT: 12.8 % (ref 11.5–14.5)
GLOBULIN SER CALC-MCNC: 4.3 G/DL (ref 2–4)
GLUCOSE SERPL-MCNC: 109 MG/DL (ref 65–100)
HCT VFR BLD AUTO: 34 % (ref 36.6–50.3)
HGB BLD-MCNC: 10.6 G/DL (ref 12.1–17)
LACTATE SERPL-SCNC: 0.9 MMOL/L (ref 0.4–2)
MAGNESIUM SERPL-MCNC: 1.4 MG/DL (ref 1.6–2.4)
MCH RBC QN AUTO: 32.4 PG (ref 26–34)
MCHC RBC AUTO-ENTMCNC: 31.2 G/DL (ref 30–36.5)
MCV RBC AUTO: 104 FL (ref 80–99)
NRBC # BLD: 0 K/UL (ref 0–0.01)
NRBC BLD-RTO: 0 PER 100 WBC
PHENOBARB SERPL-MCNC: 25.9 UG/ML (ref 15–40)
PHOSPHATE SERPL-MCNC: 2.5 MG/DL (ref 2.6–4.7)
PLATELET # BLD AUTO: 146 K/UL (ref 150–400)
PMV BLD AUTO: 11.1 FL (ref 8.9–12.9)
POTASSIUM SERPL-SCNC: 3.7 MMOL/L (ref 3.5–5.1)
PROT SERPL-MCNC: 6.7 G/DL (ref 6.4–8.2)
RBC # BLD AUTO: 3.27 M/UL (ref 4.1–5.7)
SODIUM SERPL-SCNC: 139 MMOL/L (ref 136–145)
VANCOMYCIN SERPL-MCNC: 2.3 UG/ML
WBC # BLD AUTO: 9.6 K/UL (ref 4.1–11.1)

## 2018-12-31 PROCEDURE — 92610 EVALUATE SWALLOWING FUNCTION: CPT | Performed by: SPEECH-LANGUAGE PATHOLOGIST

## 2018-12-31 PROCEDURE — 80165 DIPROPYLACETIC ACID FREE: CPT

## 2018-12-31 PROCEDURE — 65660000000 HC RM CCU STEPDOWN

## 2018-12-31 PROCEDURE — 74011000258 HC RX REV CODE- 258: Performed by: INTERNAL MEDICINE

## 2018-12-31 PROCEDURE — 80048 BASIC METABOLIC PNL TOTAL CA: CPT

## 2018-12-31 PROCEDURE — 84100 ASSAY OF PHOSPHORUS: CPT

## 2018-12-31 PROCEDURE — 74011250637 HC RX REV CODE- 250/637: Performed by: FAMILY MEDICINE

## 2018-12-31 PROCEDURE — 77030011256 HC DRSG MEPILEX <16IN NO BORD MOLN -A

## 2018-12-31 PROCEDURE — 80076 HEPATIC FUNCTION PANEL: CPT

## 2018-12-31 PROCEDURE — 95816 EEG AWAKE AND DROWSY: CPT | Performed by: PSYCHIATRY & NEUROLOGY

## 2018-12-31 PROCEDURE — C9113 INJ PANTOPRAZOLE SODIUM, VIA: HCPCS | Performed by: INTERNAL MEDICINE

## 2018-12-31 PROCEDURE — 85027 COMPLETE CBC AUTOMATED: CPT

## 2018-12-31 PROCEDURE — 83735 ASSAY OF MAGNESIUM: CPT

## 2018-12-31 PROCEDURE — 74011250637 HC RX REV CODE- 250/637: Performed by: INTERNAL MEDICINE

## 2018-12-31 PROCEDURE — 83605 ASSAY OF LACTIC ACID: CPT

## 2018-12-31 PROCEDURE — 36415 COLL VENOUS BLD VENIPUNCTURE: CPT

## 2018-12-31 PROCEDURE — 80202 ASSAY OF VANCOMYCIN: CPT

## 2018-12-31 PROCEDURE — 82550 ASSAY OF CK (CPK): CPT

## 2018-12-31 PROCEDURE — 80184 ASSAY OF PHENOBARBITAL: CPT

## 2018-12-31 PROCEDURE — 74011250636 HC RX REV CODE- 250/636: Performed by: INTERNAL MEDICINE

## 2018-12-31 RX ORDER — DIVALPROEX SODIUM 250 MG/1
500 TABLET, DELAYED RELEASE ORAL 2 TIMES DAILY
Status: DISCONTINUED | OUTPATIENT
Start: 2018-12-31 | End: 2019-01-04 | Stop reason: HOSPADM

## 2018-12-31 RX ORDER — LANOLIN ALCOHOL/MO/W.PET/CERES
400 CREAM (GRAM) TOPICAL DAILY
Status: DISCONTINUED | OUTPATIENT
Start: 2018-12-31 | End: 2019-01-04 | Stop reason: HOSPADM

## 2018-12-31 RX ORDER — POTASSIUM CHLORIDE 1.5 G/1.77G
20 POWDER, FOR SOLUTION ORAL DAILY
Status: DISCONTINUED | OUTPATIENT
Start: 2018-12-31 | End: 2019-01-04 | Stop reason: HOSPADM

## 2018-12-31 RX ORDER — DIVALPROEX SODIUM 125 MG/1
125 TABLET, DELAYED RELEASE ORAL DAILY
Status: DISCONTINUED | OUTPATIENT
Start: 2018-12-31 | End: 2018-12-31

## 2018-12-31 RX ORDER — DIVALPROEX SODIUM 250 MG/1
250 TABLET, DELAYED RELEASE ORAL
Status: DISCONTINUED | OUTPATIENT
Start: 2018-12-31 | End: 2019-01-04 | Stop reason: HOSPADM

## 2018-12-31 RX ADMIN — Medication 10 ML: at 18:04

## 2018-12-31 RX ADMIN — METRONIDAZOLE 500 MG: 500 INJECTION, SOLUTION INTRAVENOUS at 08:22

## 2018-12-31 RX ADMIN — VANCOMYCIN HYDROCHLORIDE 750 MG: 10 INJECTION, POWDER, LYOPHILIZED, FOR SOLUTION INTRAVENOUS at 21:51

## 2018-12-31 RX ADMIN — DOCUSATE SODIUM 100 MG: 100 CAPSULE, LIQUID FILLED ORAL at 18:03

## 2018-12-31 RX ADMIN — DOCUSATE SODIUM 100 MG: 100 CAPSULE, LIQUID FILLED ORAL at 08:23

## 2018-12-31 RX ADMIN — PHENOBARBITAL SODIUM 30 MG: 65 INJECTION INTRAMUSCULAR; INTRAVENOUS at 08:22

## 2018-12-31 RX ADMIN — CEFEPIME HYDROCHLORIDE 2 G: 2 INJECTION, POWDER, FOR SOLUTION INTRAVENOUS at 19:42

## 2018-12-31 RX ADMIN — DIVALPROEX SODIUM 500 MG: 250 TABLET, DELAYED RELEASE ORAL at 12:50

## 2018-12-31 RX ADMIN — DEXTROSE MONOHYDRATE, SODIUM CHLORIDE, AND POTASSIUM CHLORIDE 100 ML/HR: 50; 4.5; 1.49 INJECTION, SOLUTION INTRAVENOUS at 06:23

## 2018-12-31 RX ADMIN — Medication 10 ML: at 07:18

## 2018-12-31 RX ADMIN — POTASSIUM CHLORIDE 20 MEQ: 1.5 POWDER, FOR SOLUTION ORAL at 12:52

## 2018-12-31 RX ADMIN — DEXTROSE MONOHYDRATE, SODIUM CHLORIDE, AND POTASSIUM CHLORIDE 100 ML/HR: 50; 4.5; 1.49 INJECTION, SOLUTION INTRAVENOUS at 19:15

## 2018-12-31 RX ADMIN — METRONIDAZOLE 500 MG: 500 INJECTION, SOLUTION INTRAVENOUS at 20:44

## 2018-12-31 RX ADMIN — PHENOBARBITAL SODIUM 30 MG: 65 INJECTION INTRAMUSCULAR; INTRAVENOUS at 20:44

## 2018-12-31 RX ADMIN — PANTOPRAZOLE SODIUM 40 MG: 40 INJECTION, POWDER, FOR SOLUTION INTRAVENOUS at 08:21

## 2018-12-31 RX ADMIN — ENOXAPARIN SODIUM 30 MG: 100 INJECTION SUBCUTANEOUS at 18:25

## 2018-12-31 RX ADMIN — Medication 400 MG: at 12:50

## 2018-12-31 RX ADMIN — DIVALPROEX SODIUM 500 MG: 250 TABLET, DELAYED RELEASE ORAL at 18:03

## 2018-12-31 RX ADMIN — Medication 10 ML: at 21:52

## 2018-12-31 RX ADMIN — DIVALPROEX SODIUM 250 MG: 250 TABLET, DELAYED RELEASE ORAL at 18:03

## 2018-12-31 NOTE — PROGRESS NOTES
12- Reason for Admission:   Severs Sepsis RRAT Score:   16 Do you (patient/family) have any concerns for transition/discharge? No  
           
Plan for utilizing home health:   No   
 
Likelihood of readmission? Moderate Transition of Care Plan:  Home with family I met with the pt's mother, Sylvie Bar ([phone]), to determine potential discharge needs. The pt normally lives with his brother, Bran Arcos ([phone]), on the first floor of his 2-story house. The family (brother and mother) and a paid caregiver provide 24 hour care but the pt also attends a day program, when able, Monday-Friday and travels there via wheelchair Tammie amarilys covered by his Medicaid. He has wheelchair and hospital bed at his brother's and mother's homes. His PCP is Dr. Wilian Ga who has no nurse navigator. He has prescription drug coverage and gets his medications from [facility] on [address] [place] [address]. CM will continue to follow and assist as needed. Care Management Interventions PCP Verified by CM: Yes(Dr. David Quintanilla nurse navigator) Transition of Care Consult (CM Consult): Discharge Planning Discharge Durable Medical Equipment: No(Has a wheelchair and hospital at both mother's and brother's homes) Physical Therapy Consult: Yes Occupational Therapy Consult: Yes Speech Therapy Consult: Yes Current Support Network: (Lives with brother and/or mother) Confirm Follow Up Transport: Family Plan discussed with Pt/Family/Caregiver: Yes Discharge Location Discharge Placement: (Return home with family) MONICA Bourne, CM

## 2018-12-31 NOTE — CONSULTS
Full neuro consult dictated [id number]    Will check AED levels. Decrease VPA to 500/750. Check EEG. May need CT head/LP if source of fever not identified.      Julianna No MD

## 2018-12-31 NOTE — CONSULTS
[address] Karla Ladd  MR#: [id number]  : 1965  ACCOUNT #: [de-identified]   DATE OF SERVICE: 2018    CONSULTED BY:  Jose J Benavides MD    REASON FOR CONSULTATION:  Altered mental status. HISTORY OF PRESENT ILLNESS:  The patient is a 81-year-old gentleman who presented to the emergency room yesterday with complaints of altered mental status and fever. The patient's mother is his full time caregiver. He has a history of cerebral palsy, quadriplegia, dysphagia, history of seizure disorder and recurrent episodes of sepsis and aspiration pneumonia. The patient's mom reports over the past several weeks, he has not been quite himself and yesterday she noted that he was more off than his usual, which is when she took his temperature rectally and found it to be 103 and brought him in for further evaluation. So far, he has not been found to have any obvious source of infection. However, on lab work his Depakote level was found to be elevated at 112 and neurology was consulted for further evaluation. When I came to see the patient, he was lying in bed. He has minimal verbal skills and was not utilizing those at the time. His mother did provide history for me. She states that he has not had a seizure in many, many years and that he takes Depakote and phenobarbital regularly. He does take these by mouth and has not missed any doses that she is aware. She does not recall his most recent level as an outpatient, but it is followed by his primary care physician and meds are adjusted as needed. His current AEDs include Depakote 625 mg in the morning and 750 mg in the evening and phenobarbital 30 mg twice a day. The family feels the patient is either depressed or starting to have more of a decline in recent months and has been more challenging to get nutrition in him.   Typically, they have to pureed his food and thicken his liquids and try to give him Ensure, etc. for protein. He does have spasticity in the upper arms and is flaccid in his lower extremities, which is not new and they have been having more difficulty with pressure ulcers recently due to patient not being as mobile as previously. PAST MEDICAL HISTORY:  Aspiration pneumonia, cerebral palsy, history of seizures, dysphagia. SOCIAL HISTORY:  The patient is single. Does not smoke or drink. FAMILY HISTORY:  Cancer. ALLERGIES:  SOMETIMES ANTIBIOTIC BUT UNCLEAR. MEDICATIONS:  Depakote 625 in a.m. and 750 in p.m., Protonix 40 mg, phenobarbital 30 b.i.d., Dilaudid as needed, Benadryl, Colace, Zofran, Tylenol, Lovenox, vancomycin, cefepime and Flagyl. REVIEW OF SYSTEMS:  Unable to obtain secondary to patient's mental status. CLINICAL DATA:  Reviewed. No neuroimaging is available. LABORATORY DATA:  Blood cultures with no growth to date. Influenza negative. Respiratory panel negative. UA negative for infection. Metabolic panel within normal limits. CBC with an elevated WBC of 12.1. Valproic acid of 112, magnesium of 1.4, lactic acid of 0.9. PHYSICAL EXAMINATION:  VITAL SIGNS:  Blood pressure 122/87, pulse 81, temperature 97.1, respirations 18, oxygen saturation 99%. GENERAL:  The patient is alert and cooperative with exam.  He cries out in pain throughout the exam.  HEAD:  Normocephalic without obvious abnormality. EYES:  Conjunctivae clear. Pupils are equally round and reactive to light. Extraocular movements are intact. Visual fields appear to be full. LUNGS:  Nonlabored breathing. HEART:  Regular rate and rhythm. ABDOMEN:  Soft. EXTREMITIES:  Atraumatic with contractures. Pulses 2+ and symmetric. SKIN:  Color, texture, turgor normal.  NEUROLOGIC:  General attention to the patient is alert. Language nonverbal.  Memory:  Unable to be assessed. Cranial nerves II-XII:  Pupils equally round and reactive to light. Extraocular movements are intact.   Face appears to be symmetric. Tongue is midline. Motor:  Increased tone in the upper extremities and flaccid in the lower extremities, which is baseline. No tremor. Strength: The patient is able to squeeze hands bilaterally and wiggle toes bilaterally. Sensory appears to be intact to light touch x4. Coordination and gait:  Deferred. Reflexes 3+ throughout. IMPRESSION:  This is a 60-year-old gentleman with a history of cerebral palsy, quadriplegia and a seizure disorder who presents with fever and altered mental status. The patient's altered state has been ongoing for several weeks and seems to be declining according to family. We will adjust his medications as I suspect since he is not taking in as much protein and good nutrition. This is affecting his level. We will check a phenobarbital level. We will decrease his Depakote will also check an EEG. Potentially, if the patient does not improve and no source of infection is found, we will need to consider CT and potentially spinal tap. RECOMMENDATIONS:  1.  EEG. 2.  We will probably need a CT head tomorrow if the patient does not improve. 3.  Decrease Depakote to 500 in a.m. and 750 in p.m. 4.  Continue phenobarbital 30 mg twice a day. 5.  We will check Depakote level in a.m. and we will check phenobarbital level now. 6.  Seizure precautions. 7.  Limit pain medications in this patient if possible. 8.  ST, OT, PT eval.  9. VTE prophylaxis with Lovenox. 10.  Continue antibiotics per primary as attempted to define source of potential infection. Thank you very much for this consultation. We will follow this patient with you and give further recommendations as indicated.       MD RISSA Duarte / DHARMESH  D: 12/31/2018 11:10     T: 12/31/2018 11:32  JOB #: [id number]

## 2018-12-31 NOTE — PROGRESS NOTES
Speech Path Consult received and appreciated. Chart reviewed. Patient currently being evaluated by neurology. Will f/u once available. Addendum 1100: Met with patient and mother bedside. Patient on pureed/HONEY thickened liquids at baseline. Mother well aware of aspiration risks/precautions and this is well documented in his chart from previous assessments. Family has no interest in feeding tube. Mother does report more frequent coughing with PO at home and knows that he is likely aspiration but again. She has no interest in pursuing alternate means of nutrition. She is however, interested in speaking with RD re: diet optimization. Discussed possibility of discontinuing thickener in the future if she does not feel like it is helping (as it sounds like it may not be adding any benefit/reducing aspiration risk). Mother verbalized understanding but does report she feel like it helps still. Signing off at this time; d/w RN and MD. Roxana Root, MS, CCC-SLP, BCS-S

## 2018-12-31 NOTE — PROGRESS NOTES
Daily Progress Note: 12/31/2018 Rangel Oro MD 
 
Assessment/Plan:  
1) Severe sepsis/recurrent aspiration pneumonia: due to dysphagia, cerebral palsy. Patient's mom (POA), is not interested in PEG tube/feeding tubes or hospice. IV Vanc/cefepime/flagyl   
2) Acute metabolic encephalopathy: more lethargic than baseline. Due to above.  
  
3) Seizure d/o: resume depakote. Switch phenobarb to IV. 
  
4) R arm pain/contracture:  chronic. Family said that pain is worse. Will xray R arm, shoulder. Consult Ortho, PT/OT 
  
5) Cerebral palsy/functional quad: cont supportive care 
  
6) Severe pro-jesus malnutrition: consult nutrition . Family declined PEG tube   
7) Pressure ulcers: POA. Has decub ulcers, elbow and heel ulcers. Will consult wound care 8) Dysphagia - chronic - worsening:  Risks of feeding discussed with pts mother - insists diet be restarted.   
  
Code: DNR - discussed with mother (POA). Not interested in Hospice 
   
 
Problem List: 
Problem List as of 12/31/2018 Date Reviewed: 12/30/2018 Codes Class Noted - Resolved * (Principal) Severe sepsis ([facility] ICD-10-CM: A41.9, R65.20 ICD-9-CM: 038.9, 995.92  12/30/2018 - Present Acute metabolic encephalopathy ADDISON-18-KH: G93.41 
ICD-9-CM: 348.31  12/30/2018 - Present Right arm pain ICD-10-CM: V24.593 ICD-9-CM: 729.5  12/30/2018 - Present Decubitus ulcers ICD-10-CM: L89.90 ICD-9-CM: 707.00, 707.20  12/30/2018 - Present Sepsis ([facility] ICD-10-CM: A41.9 ICD-9-CM: 038.9, 995.91  7/8/2017 - Present Aspiration pneumonia ([facility] ICD-10-CM: J69.0 ICD-9-CM: 507.0  7/8/2017 - Present Hypotension ICD-10-CM: I95.9 ICD-9-CM: 458.9  7/8/2017 - Present Seizure disorder ([address] ICD-10-CM: G40.909 ICD-9-CM: 345.90  7/8/2017 - Present Fever ICD-10-CM: R50.9 ICD-9-CM: 780.60  8/31/2014 - Present SIRS (systemic inflammatory response syndrome) (HCC) ICD-10-CM: R65.10 ICD-9-CM: 995.90  8/31/2014 - Present Cough ICD-10-CM: R05 ICD-9-CM: 786.2  8/31/2014 - Present Sepsis(995.91) ICD-10-CM: A41.9 ICD-9-CM: 995.91  6/15/2012 - Present UTI (urinary tract infection) ICD-10-CM: N39.0 ICD-9-CM: 599.0  6/15/2012 - Present Overview Signed 6/17/2012  3:58 PM by Joana Mueller Escherichia coli    >100K col/mL Phenobarbital toxicity ICD-10-CM: T42.3X1A 
ICD-9-CM: 967.0, E980.1  6/15/2012 - Present Cerebral palsy ([address] ICD-10-CM: G80.9 ICD-9-CM: 343.9  6/15/2012 - Present Scoliosis ICD-10-CM: M41.9 ICD-9-CM: 737.30  6/15/2012 - Present Dehydration ICD-10-CM: E86.0 ICD-9-CM: 276.51  6/15/2012 - Present Leukocytosis, unspecified ICD-10-CM: M68.163 ICD-9-CM: 288.60  6/15/2012 - Present Encephalopathy ICD-10-CM: G93.40 ICD-9-CM: 348.30  6/15/2012 - Present Subjective:  
  46 yo hx of cerebral palsy, functional quadriplegia, dysphagia, seizure d/o, pressure ulcers, presented w/ severe sepsis, recurrent aspiration pneumonia, AMS. The patient cannot give a history. His mother (POA), stated that the patient has been less \"responsive\" over the past 2 days, associated with high fevers. She noticed that he has been having more difficulty eating and drinking. Per [facility], the patient has a hx of multiple admissions for aspiration pneumonia. In the ED, WBC was 12.1, temp 101.2.  (Dr Caden Alvarenga). 12/31:  Pts mother reports pt is better. She wants his diet resumed. Risks of aspiration/choking/death dicussed at length and pts mother understands but wants diet and po meds resumed anyway. She does not want PEG or any type of feeding tube. Tmax 101.1. Tnow 97.3. Cultures remain neg. Respir Viral panel neg. Review of Systems:  
Review of systems not obtained due to patient factors. Objective:  
Physical Exam:  
Visit Vitals /87 (BP 1 Location: Left arm, BP Patient Position: At rest) Pulse 81 Temp 97.3 °F (36.3 °C) Resp 18 Wt 36.5 kg (80 lb 8 oz) SpO2 99% BMI 12.99 kg/m² O2 Device: Room air Temp (24hrs), Av.5 °F (36.9 °C), Min:97.2 °F (36.2 °C), Max:101.2 °F (38.4 °C) No intake/output data recorded. No intake/output data recorded. General:  Alert, no distress, appears stated age. Head:  Normocephalic, without obvious abnormality, atraumatic. Eyes:  Conjunctivae/corneas clear. PERRL, EOMs intact. Throat: Lips, mucosa, and tongue moist.  
Neck: Supple, symmetrical, trachea midline, no adenopathy, thyroid: no enlargement/tenderness/nodules, no carotid bruit and no JVD. Back:   Symmetric, no curvature. ROM normal. No CVA tenderness. Lungs:   Clear to auscultation bilaterally. Chest wall:  No tenderness or deformity. Heart:  Regular rate and rhythm, S1, S2 normal, no murmur, click, rub or gallop. Abdomen:   Soft, non-tender. Bowel sounds normal. No masses,  No organomegaly. Extremities: no cyanosis or edema. No calf tenderness or cords. Skin: Chronic ulcers - right elbow, heels. Turgor fair. Neurologic: Not oriented. Does not follow commands. Contractures unchanged. Data Review:  
   
Recent Days: 
Recent Labs 18 
0133 18 
1314 WBC 9.6 12.1* HGB 10.6* 11.1*  
HCT 34.0* 34.9*  
* 202 Recent Labs 18 
0133 18 
1314  141  
K 3.7 3.7  104 CO2 26 27 * 146* BUN 11 18 CREA 0.34* 0.40* CA 8.7 8.9 MG 1.4*  --   
PHOS 2.5*  --   
ALB 2.4* 2.9* TBILI 0.3 0.2 SGOT 8* 8* ALT 12 12 No results for input(s): PH, PCO2, PO2, HCO3, FIO2 in the last 72 hours. 24 Hour Results: 
Recent Results (from the past 24 hour(s)) URINALYSIS W/MICROSCOPIC Collection Time: 18  1:14 PM  
Result Value Ref Range Color YELLOW/STRAW Appearance CLEAR CLEAR Specific gravity 1.019 1.003 - 1.030    
 pH (UA) 8.0 5.0 - 8.0 Protein NEGATIVE  NEG mg/dL Glucose NEGATIVE  NEG mg/dL Ketone NEGATIVE  NEG mg/dL Bilirubin NEGATIVE  NEG Blood NEGATIVE  NEG Urobilinogen 0.2 0.2 - 1.0 EU/dL Nitrites NEGATIVE  NEG Leukocyte Esterase NEGATIVE  NEG    
 WBC 0-4 0 - 4 /hpf  
 RBC 0-5 0 - 5 /hpf Epithelial cells FEW FEW /lpf Bacteria NEGATIVE  NEG /hpf Mucus TRACE (A) NEG /lpf Amorphous Crystals FEW (A) NEG URINE CULTURE HOLD SAMPLE Collection Time: 12/30/18  1:14 PM  
Result Value Ref Range Urine culture hold URINE ON HOLD IN MICROBIOLOGY DEPT FOR 3 DAYS. IF UNPRESERVED URINE IS SUBMITTED, IT CANNOT BE USED FOR ADDITIONAL TESTING AFTER 24 HRS, RECOLLECTION WILL BE REQUIRED. CULTURE, BLOOD Collection Time: 12/30/18  1:14 PM  
Result Value Ref Range Special Requests: NO SPECIAL REQUESTS Culture result: NO GROWTH AFTER 15 HOURS METABOLIC PANEL, COMPREHENSIVE Collection Time: 12/30/18  1:14 PM  
Result Value Ref Range Sodium 141 136 - 145 mmol/L Potassium 3.7 3.5 - 5.1 mmol/L Chloride 104 97 - 108 mmol/L  
 CO2 27 21 - 32 mmol/L Anion gap 10 5 - 15 mmol/L Glucose 146 (H) 65 - 100 mg/dL BUN 18 6 - 20 MG/DL Creatinine 0.40 (L) 0.70 - 1.30 MG/DL  
 BUN/Creatinine ratio 45 (H) 12 - 20 GFR est AA >60 >60 ml/min/1.73m2 GFR est non-AA >60 >60 ml/min/1.73m2 Calcium 8.9 8.5 - 10.1 MG/DL Bilirubin, total 0.2 0.2 - 1.0 MG/DL  
 ALT (SGPT) 12 12 - 78 U/L  
 AST (SGOT) 8 (L) 15 - 37 U/L Alk. phosphatase 118 (H) 45 - 117 U/L Protein, total 7.5 6.4 - 8.2 g/dL Albumin 2.9 (L) 3.5 - 5.0 g/dL Globulin 4.6 (H) 2.0 - 4.0 g/dL A-G Ratio 0.6 (L) 1.1 - 2.2    
CBC WITH AUTOMATED DIFF Collection Time: 12/30/18  1:14 PM  
Result Value Ref Range WBC 12.1 (H) 4.1 - 11.1 K/uL  
 RBC 3.38 (L) 4.10 - 5.70 M/uL  
 HGB 11.1 (L) 12.1 - 17.0 g/dL HCT 34.9 (L) 36.6 - 50.3 % .3 (H) 80.0 - 99.0 FL  
 MCH 32.8 26.0 - 34.0 PG  
 MCHC 31.8 30.0 - 36.5 g/dL RDW 13.2 11.5 - 14.5 % PLATELET 729 822 - 968 K/uL MPV 11.4 8.9 - 12.9 FL  
 NRBC 0.0 0  WBC ABSOLUTE NRBC 0.00 0.00 - 0.01 K/uL NEUTROPHILS 86 (H) 32 - 75 % LYMPHOCYTES 3 (L) 12 - 49 % MONOCYTES 10 5 - 13 % EOSINOPHILS 0 0 - 7 % BASOPHILS 0 0 - 1 % IMMATURE GRANULOCYTES 1 (H) 0.0 - 0.5 % ABS. NEUTROPHILS 10.4 (H) 1.8 - 8.0 K/UL  
 ABS. LYMPHOCYTES 0.4 (L) 0.8 - 3.5 K/UL  
 ABS. MONOCYTES 1.2 (H) 0.0 - 1.0 K/UL  
 ABS. EOSINOPHILS 0.0 0.0 - 0.4 K/UL  
 ABS. BASOPHILS 0.0 0.0 - 0.1 K/UL  
 ABS. IMM. GRANS. 0.1 (H) 0.00 - 0.04 K/UL  
 DF AUTOMATED    
 RBC COMMENTS NORMOCYTIC, NORMOCHROMIC POC LACTIC ACID Collection Time: 12/30/18  1:17 PM  
Result Value Ref Range Lactic Acid (POC) 0.97 0.40 - 2.00 mmol/L  
CULTURE, BLOOD Collection Time: 12/30/18  1:40 PM  
Result Value Ref Range Special Requests: NO SPECIAL REQUESTS Culture result: NO GROWTH AFTER 15 HOURS    
SAMPLES BEING HELD Collection Time: 12/30/18  1:40 PM  
Result Value Ref Range SAMPLES BEING HELD 1RED, 1BLU   
 COMMENT Add-on orders for these samples will be processed based on acceptable specimen integrity and analyte stability, which may vary by analyte. INFLUENZA A & B AG (RAPID TEST) Collection Time: 12/30/18  1:40 PM  
Result Value Ref Range Influenza A Antigen NEGATIVE  NEG Influenza B Antigen NEGATIVE  NEG    
VALPROIC ACID Collection Time: 12/30/18  3:10 PM  
Result Value Ref Range Valproic acid 112 (H) 50 - 100 ug/ml RESPIRATORY PANEL,PCR,NASOPHARYNGEAL Collection Time: 12/30/18  3:13 PM  
Result Value Ref Range Adenovirus NOT DETECTED NOTD Coronavirus 229E NOT DETECTED NOTD Coronavirus HKU1 NOT DETECTED NOTD Coronavirus CVNL63 NOT DETECTED NOTD Coronavirus OC43 NOT DETECTED NOTD Metapneumovirus NOT DETECTED NOTD Rhinovirus and Enterovirus NOT DETECTED NOTD Influenza A NOT DETECTED NOTD Influenza A, subtype H1 NOT DETECTED NOTD Influenza A, subtype H3 NOT DETECTED NOTD INFLUENZA A H1N1 PCR NOT DETECTED NOTD Influenza B NOT DETECTED NOTD Parainfluenza 1 NOT DETECTED NOTD Parainfluenza 2 NOT DETECTED NOTD Parainfluenza 3 NOT DETECTED NOTD Parainfluenza virus 4 NOT DETECTED NOTD    
 RSV by PCR NOT DETECTED NOTD Bordetella pertussis - PCR NOT DETECTED NOTD Chlamydophila pneumoniae DNA, QL, PCR NOT DETECTED NOTD Mycoplasma pneumoniae DNA, QL, PCR NOT DETECTED NOTD METABOLIC PANEL, BASIC Collection Time: 12/31/18  1:33 AM  
Result Value Ref Range Sodium 139 136 - 145 mmol/L Potassium 3.7 3.5 - 5.1 mmol/L Chloride 106 97 - 108 mmol/L  
 CO2 26 21 - 32 mmol/L Anion gap 7 5 - 15 mmol/L Glucose 109 (H) 65 - 100 mg/dL BUN 11 6 - 20 MG/DL Creatinine 0.34 (L) 0.70 - 1.30 MG/DL  
 BUN/Creatinine ratio 32 (H) 12 - 20 GFR est AA >60 >60 ml/min/1.73m2 GFR est non-AA >60 >60 ml/min/1.73m2 Calcium 8.7 8.5 - 10.1 MG/DL  
CBC W/O DIFF Collection Time: 12/31/18  1:33 AM  
Result Value Ref Range WBC 9.6 4.1 - 11.1 K/uL  
 RBC 3.27 (L) 4.10 - 5.70 M/uL  
 HGB 10.6 (L) 12.1 - 17.0 g/dL HCT 34.0 (L) 36.6 - 50.3 % .0 (H) 80.0 - 99.0 FL  
 MCH 32.4 26.0 - 34.0 PG  
 MCHC 31.2 30.0 - 36.5 g/dL  
 RDW 12.8 11.5 - 14.5 % PLATELET 497 (L) 068 - 400 K/uL MPV 11.1 8.9 - 12.9 FL  
 NRBC 0.0 0  WBC ABSOLUTE NRBC 0.00 0.00 - 0.01 K/uL MAGNESIUM Collection Time: 12/31/18  1:33 AM  
Result Value Ref Range Magnesium 1.4 (L) 1.6 - 2.4 mg/dL PHOSPHORUS Collection Time: 12/31/18  1:33 AM  
Result Value Ref Range Phosphorus 2.5 (L) 2.6 - 4.7 MG/DL  
HEPATIC FUNCTION PANEL Collection Time: 12/31/18  1:33 AM  
Result Value Ref Range Protein, total 6.7 6.4 - 8.2 g/dL Albumin 2.4 (L) 3.5 - 5.0 g/dL Globulin 4.3 (H) 2.0 - 4.0 g/dL A-G Ratio 0.6 (L) 1.1 - 2.2 Bilirubin, total 0.3 0.2 - 1.0 MG/DL Bilirubin, direct 0.1 0.0 - 0.2 MG/DL Alk. phosphatase 103 45 - 117 U/L  
 AST (SGOT) 8 (L) 15 - 37 U/L  
 ALT (SGPT) 12 12 - 78 U/L  
CK Collection Time: 12/31/18  1:33 AM  
Result Value Ref Range CK 22 (L) 39 - 308 U/L  
LACTIC ACID Collection Time: 12/31/18  1:33 AM  
Result Value Ref Range Lactic acid 0.9 0.4 - 2.0 MMOL/L Medications reviewed Current Facility-Administered Medications Medication Dose Route Frequency  sodium chloride (NS) flush 5-10 mL  5-10 mL IntraVENous PRN  
 albuterol-ipratropium (DUO-NEB) 2.5 MG-0.5 MG/3 ML  3 mL Nebulization Q4H PRN  pantoprazole (PROTONIX) 40 mg in sodium chloride 0.9% 10 mL injection  40 mg IntraVENous DAILY  PHENobarbital (LUMINAL) injection 30 mg  30 mg IntraVENous Q12H  
 dextrose 5% - 0.45% NaCl with KCl 20 mEq/L infusion  100 mL/hr IntraVENous CONTINUOUS  
 sodium chloride (NS) flush 5-10 mL  5-10 mL IntraVENous Q8H  
 sodium chloride (NS) flush 5-10 mL  5-10 mL IntraVENous PRN  
 HYDROmorphone (DILAUDID) syringe 0.2 mg  0.2 mg IntraVENous Q4H PRN  
 naloxone (NARCAN) injection 0.4 mg  0.4 mg IntraVENous PRN  
 diphenhydrAMINE (BENADRYL) injection 12.5 mg  12.5 mg IntraVENous Q4H PRN  
 ondansetron (ZOFRAN) injection 4 mg  4 mg IntraVENous Q6H PRN  
 docusate sodium (COLACE) capsule 100 mg  100 mg Oral BID  acetaminophen (TYLENOL) suppository 650 mg  650 mg Rectal Q4H PRN  
 enoxaparin (LOVENOX) injection 30 mg  30 mg SubCUTAneous Q24H  Vancomycin- Dosing by levels   Other Rx Dosing/Monitoring  Vancomycin random level 12/31 @ 1500   Other ONCE  
 cefepime (MAXIPIME) 2 g in 0.9% sodium chloride (MBP/ADV) 100 mL  2 g IntraVENous Q24H  
 metroNIDAZOLE (FLAGYL) IVPB premix 500 mg  500 mg IntraVENous Q12H Care Plan discussed with: Patient/Family and Nurse Total time spent with patient: 30 minutes.  
Demarcus Mason MD

## 2018-12-31 NOTE — PROCEDURES
Patient Name: Denver Pour  : 1965  Age: 47 y.o. Ordering physician: No ref. provider found  Date of EE2018  EEG procedure number: [id number]  Diagnosis: AMS  Interpreting physician: Zuleyma Rangel MD      ELECTROENCEPHALOGRAM REPORT     PROCEDURE: EEG. CLINICAL INDICATION: The patient is a 47 y.o. male with a history of   possible seizures. EEG to rule out seizures, rule out stroke, rule out   cortical abnormality. EEG CLASSIFICATION:Abnormal     DESCRIPTION OF THE RECORD:   The background of this recording contains a symmetric 5Hz theta slowing. Throughout the recording, there were no clear areas of focal slowing nor spike or spike-and-wave discharges seen. Hyperventilation and photic stimulation were not performed. During the recording the patient did not achieve stage II sleep    INTERPRETATION:   Abnormal. Moderate diffuse cerebral dysfunction which is non specific for etiology but can be seen in toxic/metabolic states. No seizures. Clinical correlation recommended.       Narne Najera MD  2018  6:45 PM

## 2018-12-31 NOTE — CONSULTS
ORTHOPEDIC CONSULT    Subjective:     Date of Consultation:  December 31, 2018    Referring Physician:  Dr. Titi Dave is a 47 y.o. male who is being seen for chronic right elbow pain. This is a patient who has a significant past medical history in which the entirety of the HPI was taken from MR and from his mother today. He has a significant past medical history of cerebral palsy, functional quadriplegia, dysphagia, seizure d/o, and pressure ulcers. He presented last evening with severe sepsis, recurrent aspiration pneumonia and AMS. Per his mother he has been \"wincing\" in pain every time she would attempt to transfer him from bed to wheel chair. She states the majority of his pain was derived from the elbow. Her main concern is that she is worried she hurt his elbow during a transfer. She denies worsening shoulder pain. She admits that his hand does get cold and pale looking, but is positional in nature.       Patient Active Problem List    Diagnosis Date Noted    Severe sepsis ([address] 12/30/2018    Acute metabolic encephalopathy 96/34/2922    Right arm pain 12/30/2018    Decubitus ulcers 12/30/2018    Sepsis ([address] 07/08/2017    Aspiration pneumonia ([address] 07/08/2017    Hypotension 07/08/2017    Seizure disorder ([address] 07/08/2017    Fever 08/31/2014    SIRS (systemic inflammatory response syndrome) (HCC) 08/31/2014    Cough 08/31/2014    Sepsis(995.91) 06/15/2012    UTI (urinary tract infection) 06/15/2012    Phenobarbital toxicity 06/15/2012    Cerebral palsy ([address] 06/15/2012    Scoliosis 06/15/2012    Dehydration 06/15/2012    Leukocytosis, unspecified 06/15/2012    Encephalopathy 06/15/2012     Family History   Problem Relation Age of Onset    Cancer Father         prostate    Cancer Maternal Aunt     Heart Disease Maternal Aunt     Cancer Maternal Grandmother     Cancer Maternal Grandfather     Cancer Paternal Grandmother     Cancer Paternal Grandfather     Cancer Other Social History     Tobacco Use    Smoking status: Never Smoker    Smokeless tobacco: Never Used   Substance Use Topics    Alcohol use: No     Past Medical History:   Diagnosis Date    Aspiration pneumonia (HCC)     Cerebral palsy (HCC)     Ill-defined condition     dsyphasia    Scoliosis     Seizure disorder ([facility]     Seizures ([facility]       Past Surgical History:   Procedure Laterality Date    HX HEENT        Prior to Admission medications    Medication Sig Start Date End Date Taking? Authorizing Provider   divalproex DR (DEPAKOTE) 125 mg tablet Take 250 mg by mouth daily (with dinner). Takes with 500mg at dinner. Yes Provider, Historical   aspirin delayed-release 81 mg tablet Take 81 mg by mouth daily (with dinner). Yes Provider, Historical   ascorbic acid, vitamin C, powd Take 1,000 mg by mouth daily. Mix with liquid prior to administration. Yes Provider, Historical   omeprazole (PRILOSEC) 20 mg capsule Take 20 mg by mouth Before breakfast and dinner. Yes Provider, Historical   PHENobarbital (LUMINAL) 30 mg tablet Take 30 mg by mouth two (2) times a day. Yes Provider, Historical   divalproex DR (DEPAKOTE) 125 mg tablet Take 125 mg by mouth daily. Takes with 500mg in the morning. Yes Provider, Historical   acetaminophen (TYLENOL) 500 mg tablet Take 1,000 mg by mouth every six (6) hours as needed for Pain. Yes Provider, Historical   DOCUSATE CALCIUM (STOOL SOFTENER PO) Take 1 tablet by mouth two (2) times daily as needed. Yes Provider, Historical   divalproex DR (DEPAKOTE) 500 mg tablet Take 1 Tab by mouth two (2) times a day.  4/23/14  Yes Germán Etienne NP     Current Facility-Administered Medications   Medication Dose Route Frequency    divalproex DR (DEPAKOTE) tablet 250 mg  250 mg Oral DAILY WITH DINNER    divalproex DR (DEPAKOTE) tablet 500 mg  500 mg Oral BID    potassium chloride (KLOR-CON) packet for solution 20 mEq  20 mEq Oral DAILY    magnesium oxide (MAG-OX) tablet 400 mg 400 mg Oral DAILY    sodium chloride (NS) flush 5-10 mL  5-10 mL IntraVENous PRN    albuterol-ipratropium (DUO-NEB) 2.5 MG-0.5 MG/3 ML  3 mL Nebulization Q4H PRN    pantoprazole (PROTONIX) 40 mg in sodium chloride 0.9% 10 mL injection  40 mg IntraVENous DAILY    PHENobarbital (LUMINAL) injection 30 mg  30 mg IntraVENous Q12H    dextrose 5% - 0.45% NaCl with KCl 20 mEq/L infusion  100 mL/hr IntraVENous CONTINUOUS    sodium chloride (NS) flush 5-10 mL  5-10 mL IntraVENous Q8H    sodium chloride (NS) flush 5-10 mL  5-10 mL IntraVENous PRN    HYDROmorphone (DILAUDID) syringe 0.2 mg  0.2 mg IntraVENous Q4H PRN    naloxone (NARCAN) injection 0.4 mg  0.4 mg IntraVENous PRN    diphenhydrAMINE (BENADRYL) injection 12.5 mg  12.5 mg IntraVENous Q4H PRN    ondansetron (ZOFRAN) injection 4 mg  4 mg IntraVENous Q6H PRN    docusate sodium (COLACE) capsule 100 mg  100 mg Oral BID    acetaminophen (TYLENOL) suppository 650 mg  650 mg Rectal Q4H PRN    enoxaparin (LOVENOX) injection 30 mg  30 mg SubCUTAneous Q24H    Vancomycin- Dosing by levels   Other Rx Dosing/Monitoring    Vancomycin random level  @ 1500   Other ONCE    cefepime (MAXIPIME) 2 g in 0.9% sodium chloride (MBP/ADV) 100 mL  2 g IntraVENous Q24H    metroNIDAZOLE (FLAGYL) IVPB premix 500 mg  500 mg IntraVENous Q12H     Allergies   Allergen Reactions    Other Medication Swelling     Mother states the patient had a reaction to an antibiotic as a child  She cannot recall the name of the antibiotic nor the indication   Per [facility] [phone] the patient has had prescriptions for Augmentin  Tolerates Cefdinir. Review of Systems:  Pertinent items are noted in HPI.     Objective:     Patient Vitals for the past 8 hrs:   BP Temp Pulse Resp SpO2   18 1100 110/84 97.3 °F (36.3 °C) 81 18 97 %   18 0700 122/87 97.1 °F (36.2 °C) 81 18 99 %     Temp (24hrs), Av.2 °F (36.8 °C), Min:97.1 °F (36.2 °C), Max:101.2 °F (38.4 °C)        EXAM: GEN: Frail appearing male in NAD  PSYCH: Alert to my presence. He is unable to respond to my questions, but responds to painful stimuli. MUSC: Right arm is atrophic. There a pressure ulceration over the right elbow at the medial epicondyle in stage 2. There is one small pressure ulcer present at the olecranon process stage 1. There is slight serous drainage from the medial pressure wound. There is no joint effusion. His forearm is flexed and contracted. I can passively extend to 30 degrees. Flexion to 120 degrees passively. Right radial and ulnar pulses are intact and bounding. BCR of all digits. Right hand is held in a flexed position. There is mild atrophy of the thenar eminence. IMAGING:  FINDINGS: Two views of the right radius and ulna demonstrate no fracture or  other acute osseous, articular or soft tissue abnormality.     IMPRESSION  IMPRESSION: No acute abnormality. FINDINGS: Two views of the right humerus demonstrate no fracture or other acute  osseous, articular or soft tissue abnormality.     IMPRESSION  IMPRESSION: No acute abnormality. Data Review   Recent Results (from the past 24 hour(s))   URINALYSIS W/MICROSCOPIC    Collection Time: 12/30/18  1:14 PM   Result Value Ref Range    Color YELLOW/STRAW      Appearance CLEAR CLEAR      Specific gravity 1.019 1.003 - 1.030      pH (UA) 8.0 5.0 - 8.0      Protein NEGATIVE  NEG mg/dL    Glucose NEGATIVE  NEG mg/dL    Ketone NEGATIVE  NEG mg/dL    Bilirubin NEGATIVE  NEG      Blood NEGATIVE  NEG      Urobilinogen 0.2 0.2 - 1.0 EU/dL    Nitrites NEGATIVE  NEG      Leukocyte Esterase NEGATIVE  NEG      WBC 0-4 0 - 4 /hpf    RBC 0-5 0 - 5 /hpf    Epithelial cells FEW FEW /lpf    Bacteria NEGATIVE  NEG /hpf    Mucus TRACE (A) NEG /lpf    Amorphous Crystals FEW (A) NEG     URINE CULTURE HOLD SAMPLE    Collection Time: 12/30/18  1:14 PM   Result Value Ref Range    Urine culture hold        URINE ON HOLD IN MICROBIOLOGY DEPT FOR 3 DAYS.  IF UNPRESERVED URINE IS SUBMITTED, IT CANNOT BE USED FOR ADDITIONAL TESTING AFTER 24 HRS, RECOLLECTION WILL BE REQUIRED. CULTURE, BLOOD    Collection Time: 12/30/18  1:14 PM   Result Value Ref Range    Special Requests: NO SPECIAL REQUESTS      Culture result: NO GROWTH AFTER 19 HOURS     METABOLIC PANEL, COMPREHENSIVE    Collection Time: 12/30/18  1:14 PM   Result Value Ref Range    Sodium 141 136 - 145 mmol/L    Potassium 3.7 3.5 - 5.1 mmol/L    Chloride 104 97 - 108 mmol/L    CO2 27 21 - 32 mmol/L    Anion gap 10 5 - 15 mmol/L    Glucose 146 (H) 65 - 100 mg/dL    BUN 18 6 - 20 MG/DL    Creatinine 0.40 (L) 0.70 - 1.30 MG/DL    BUN/Creatinine ratio 45 (H) 12 - 20      GFR est AA >60 >60 ml/min/1.73m2    GFR est non-AA >60 >60 ml/min/1.73m2    Calcium 8.9 8.5 - 10.1 MG/DL    Bilirubin, total 0.2 0.2 - 1.0 MG/DL    ALT (SGPT) 12 12 - 78 U/L    AST (SGOT) 8 (L) 15 - 37 U/L    Alk. phosphatase 118 (H) 45 - 117 U/L    Protein, total 7.5 6.4 - 8.2 g/dL    Albumin 2.9 (L) 3.5 - 5.0 g/dL    Globulin 4.6 (H) 2.0 - 4.0 g/dL    A-G Ratio 0.6 (L) 1.1 - 2.2     CBC WITH AUTOMATED DIFF    Collection Time: 12/30/18  1:14 PM   Result Value Ref Range    WBC 12.1 (H) 4.1 - 11.1 K/uL    RBC 3.38 (L) 4.10 - 5.70 M/uL    HGB 11.1 (L) 12.1 - 17.0 g/dL    HCT 34.9 (L) 36.6 - 50.3 %    .3 (H) 80.0 - 99.0 FL    MCH 32.8 26.0 - 34.0 PG    MCHC 31.8 30.0 - 36.5 g/dL    RDW 13.2 11.5 - 14.5 %    PLATELET 933 924 - 818 K/uL    MPV 11.4 8.9 - 12.9 FL    NRBC 0.0 0  WBC    ABSOLUTE NRBC 0.00 0.00 - 0.01 K/uL    NEUTROPHILS 86 (H) 32 - 75 %    LYMPHOCYTES 3 (L) 12 - 49 %    MONOCYTES 10 5 - 13 %    EOSINOPHILS 0 0 - 7 %    BASOPHILS 0 0 - 1 %    IMMATURE GRANULOCYTES 1 (H) 0.0 - 0.5 %    ABS. NEUTROPHILS 10.4 (H) 1.8 - 8.0 K/UL    ABS. LYMPHOCYTES 0.4 (L) 0.8 - 3.5 K/UL    ABS. MONOCYTES 1.2 (H) 0.0 - 1.0 K/UL    ABS. EOSINOPHILS 0.0 0.0 - 0.4 K/UL    ABS. BASOPHILS 0.0 0.0 - 0.1 K/UL    ABS. IMM.  GRANS. 0.1 (H) 0.00 - 0.04 K/UL DF AUTOMATED      RBC COMMENTS NORMOCYTIC, NORMOCHROMIC     POC LACTIC ACID    Collection Time: 12/30/18  1:17 PM   Result Value Ref Range    Lactic Acid (POC) 0.97 0.40 - 2.00 mmol/L   CULTURE, BLOOD    Collection Time: 12/30/18  1:40 PM   Result Value Ref Range    Special Requests: NO SPECIAL REQUESTS      Culture result: NO GROWTH AFTER 18 HOURS     SAMPLES BEING HELD    Collection Time: 12/30/18  1:40 PM   Result Value Ref Range    SAMPLES BEING HELD 1RED, 1BLU     COMMENT        Add-on orders for these samples will be processed based on acceptable specimen integrity and analyte stability, which may vary by analyte.    INFLUENZA A & B AG (RAPID TEST)    Collection Time: 12/30/18  1:40 PM   Result Value Ref Range    Influenza A Antigen NEGATIVE  NEG      Influenza B Antigen NEGATIVE  NEG     VALPROIC ACID    Collection Time: 12/30/18  3:10 PM   Result Value Ref Range    Valproic acid 112 (H) 50 - 100 ug/ml   RESPIRATORY PANEL,PCR,NASOPHARYNGEAL    Collection Time: 12/30/18  3:13 PM   Result Value Ref Range    Adenovirus NOT DETECTED NOTD      Coronavirus 229E NOT DETECTED NOTD      Coronavirus HKU1 NOT DETECTED NOTD      Coronavirus CVNL63 NOT DETECTED NOTD      Coronavirus OC43 NOT DETECTED NOTD      Metapneumovirus NOT DETECTED NOTD      Rhinovirus and Enterovirus NOT DETECTED NOTD      Influenza A NOT DETECTED NOTD      Influenza A, subtype H1 NOT DETECTED NOTD      Influenza A, subtype H3 NOT DETECTED NOTD      INFLUENZA A H1N1 PCR NOT DETECTED NOTD      Influenza B NOT DETECTED NOTD      Parainfluenza 1 NOT DETECTED NOTD      Parainfluenza 2 NOT DETECTED NOTD      Parainfluenza 3 NOT DETECTED NOTD      Parainfluenza virus 4 NOT DETECTED NOTD      RSV by PCR NOT DETECTED NOTD      Bordetella pertussis - PCR NOT DETECTED NOTD      Chlamydophila pneumoniae DNA, QL, PCR NOT DETECTED NOTD      Mycoplasma pneumoniae DNA, QL, PCR NOT DETECTED NOTD     METABOLIC PANEL, BASIC    Collection Time: 12/31/18  1:33 AM Result Value Ref Range    Sodium 139 136 - 145 mmol/L    Potassium 3.7 3.5 - 5.1 mmol/L    Chloride 106 97 - 108 mmol/L    CO2 26 21 - 32 mmol/L    Anion gap 7 5 - 15 mmol/L    Glucose 109 (H) 65 - 100 mg/dL    BUN 11 6 - 20 MG/DL    Creatinine 0.34 (L) 0.70 - 1.30 MG/DL    BUN/Creatinine ratio 32 (H) 12 - 20      GFR est AA >60 >60 ml/min/1.73m2    GFR est non-AA >60 >60 ml/min/1.73m2    Calcium 8.7 8.5 - 10.1 MG/DL   CBC W/O DIFF    Collection Time: 12/31/18  1:33 AM   Result Value Ref Range    WBC 9.6 4.1 - 11.1 K/uL    RBC 3.27 (L) 4.10 - 5.70 M/uL    HGB 10.6 (L) 12.1 - 17.0 g/dL    HCT 34.0 (L) 36.6 - 50.3 %    .0 (H) 80.0 - 99.0 FL    MCH 32.4 26.0 - 34.0 PG    MCHC 31.2 30.0 - 36.5 g/dL    RDW 12.8 11.5 - 14.5 %    PLATELET 037 (L) 695 - 400 K/uL    MPV 11.1 8.9 - 12.9 FL    NRBC 0.0 0  WBC    ABSOLUTE NRBC 0.00 0.00 - 0.01 K/uL   MAGNESIUM    Collection Time: 12/31/18  1:33 AM   Result Value Ref Range    Magnesium 1.4 (L) 1.6 - 2.4 mg/dL   PHOSPHORUS    Collection Time: 12/31/18  1:33 AM   Result Value Ref Range    Phosphorus 2.5 (L) 2.6 - 4.7 MG/DL   HEPATIC FUNCTION PANEL    Collection Time: 12/31/18  1:33 AM   Result Value Ref Range    Protein, total 6.7 6.4 - 8.2 g/dL    Albumin 2.4 (L) 3.5 - 5.0 g/dL    Globulin 4.3 (H) 2.0 - 4.0 g/dL    A-G Ratio 0.6 (L) 1.1 - 2.2      Bilirubin, total 0.3 0.2 - 1.0 MG/DL    Bilirubin, direct 0.1 0.0 - 0.2 MG/DL    Alk. phosphatase 103 45 - 117 U/L    AST (SGOT) 8 (L) 15 - 37 U/L    ALT (SGPT) 12 12 - 78 U/L   CK    Collection Time: 12/31/18  1:33 AM   Result Value Ref Range    CK 22 (L) 39 - 308 U/L   LACTIC ACID    Collection Time: 12/31/18  1:33 AM   Result Value Ref Range    Lactic acid 0.9 0.4 - 2.0 MMOL/L         Assessment/Plan:   A: 1. Severe sepsis with recurrent aspiration PNA  2. Spastic type cerebral palsy with quadriplegia  3. Right elbow pressure ulcers    P: 1. The elbow pain is certainly multifactorial in this setting.   This is not a septic elbow. He has a significant contracture of the elbow which in turn has caused the pressure ulcerations to worsen. I believe his stiffness, ulcers, and muscle wasting all contribute to his painful stimuli reaction when moved. I will have OT see him while he is here to work on some light ROM of the elbow, but more importantly to fit for a resting hand splint. The flexion contracture at the wrist is worsening and will cause neurovascular issues (atrophy, vascular compromise) if not addressed. Patient can followup outpatient if not improving. Recommend continued PT at home for improved function even if minimal gains are achievable in this setting as we know they will be. Discussed case with Dr. Stefania Muller who agrees with plan.     Perry Colmenares, [address] [facility]   [facility]  [address] [facility]

## 2018-12-31 NOTE — PROGRESS NOTES
Physical Therapy Note: 
Orders acknowledged, chart reviewed, and spoke with nursing. Patient lives with his mother and has assistance from additional family members at home. Patient is dependent for ADLs and transfers to/from wheelchair. Family confirms they are able to adequately manage patient's care and do not have any equipment needs. Will complete order as patient is not appropriate for skilled OT/PT. Thank you

## 2018-12-31 NOTE — WOUND CARE
Wound care consult: 
Initial visit for skin and wound assessment, alert, no distress- mother at bedside- caregiver for patient. Bedbound CP quad. Assessment All skin folds and bony prominences assessed, turned with staff assistance. Incontinent of urine, condom cath in use. Right elbow - posterior full thickness skin loss- inner elbow red and intact. Pressure injuries present on admission. Skin thin and bony prominences protruding Sacral area intact, scarred from incontinence - resolving Heels red, intact and blanching. All skin issues present on admission. Treatment Low air loss surface obtained. Incontinent care givenRepositioned in bed Heels floated- heelbos applied to elbows and heels for protection Mepilex border to left elbow, bony prominences on back, hips to protect from pressure Recommendations/Plan Low air loss surface Turn, reposition every 2 hours as tolerated, float heels Incontinent care with comfort shields. Apply Zinc to all open areas, moisture barrier as needed. Protect all bony prominences with mepilex borders as needed- orders per Dr Olayinka Negro. Will follow, reconsult as needed.  
 
Lisa Nieves

## 2018-12-31 NOTE — PROGRESS NOTES
Nutrition Assessment: 
 
RECOMMENDATIONS/INTERVENTION(S):  
1. Continue with pureed diet /honey thick liquids per SLP 
 
2. Will add yogurt with all meals, [employer] in applesauce with all meals, Ensure Pudding with all meals, and Magic Cup BID. 3. Will monitor PO intakes of meals and supplements, weight. ASSESSMENT:  
12/31:  48 yo male admitted for Sepsis, recurrent aspiration pneumonia. RD assessment for MD consult: general nutrition management and supplements. Underweight per BMI. Mother present, states pt has been losing weight recently but is unable to weigh him at home so unsure of amount. Medical records indicate pt's weight fluctuates but is down 8.7kg in last 6 months. PMhx: CP, functional quadriplegia, seizures, dysphagia. Mother prepares all of pt's meals at home, blending cooked food to pureed consistency and liquids to honey thick. Pureed with honey thick liquids ordered this admission as well. Family is refusing PEG and hospice. Pt has off and on bouts of coughing with meals, when this occurs, mother holds off on feeding him the rest of the meal till he calms down. For breakfast she prepares oatmeal blended with Ensure Plus and banana, applesauce with protein powder and thickened juice. For lunch he has thickened vegetable soup mixed with protein powder, yogurt, pudding, applesauce. Tal Khan is similar. At the end of the day she gives him a total of 1 Ensure Plus and 2.5 scoops of protein powder. Discussed additional ways to increase kcal intake. Discussed in house supplements she wants pt to receive with meals. Pt has very good appetite. Labs reviewed. Meds: colace, Mgox, Kcl, D5 1/2NaCl with 20mEqkcl at 100ml/hr. Diet Order: Puree 
% Eaten:  No data found. Pertinent Medications: [x] Reviewed Labs: [x] Reviewed Anthropometrics: Height: 5' 6\" (167.6 cm) Weight: 36.5 kg (80 lb 7.5 oz)  IBW (%IBW):   ( ) UBW (%UBW):   (  %)   
 
 BMI: Body mass index is 12.99 kg/m². This BMI is indicative of: 
 [x] Underweight    [] Normal    [] Overweight    []  Obesity    []  Extreme Obesity (BMI>40) Estimated Nutrition Needs (Based on): 6368 Kcals/day(BMR 1158 x  AF 1.3) , 44 g(44-51gm (1.2-1.4gm/kg/d)) Protein Carbohydrate: At Least 130 g/day  Fluids: 1505mL/day (1 ml/kcal) Last BM: 12/27   [x]Active     []Hyperactive  []Hypoactive       [] Absent   BS Skin:    [] Intact   [] Incision  [x] Breakdown - wounds to right elbow and sacrum     [] DTI   [] Tears/Excoriation/Abrasion  []Edema [] Other: Wt Readings from Last 30 Encounters:  
12/31/18 36.5 kg (80 lb 7.5 oz) 10/14/18 32.7 kg (72 lb)  
06/26/18 45.2 kg (99 lb 10.4 oz) 08/05/17 38.6 kg (85 lb)  
07/16/17 40.4 kg (89 lb 1.1 oz) 03/10/16 35.8 kg (78 lb 16 oz) 03/06/16 35.8 kg (78 lb 16 oz) 09/06/14 34.9 kg (77 lb) 08/31/14 31.8 kg (70 lb) 12/20/13 34.9 kg (77 lb)  
11/22/13 34.9 kg (77 lb) 08/02/12 40.8 kg (90 lb)  
06/15/12 45.4 kg (100 lb) NUTRITION DIAGNOSES:  
Problem:  Swallowing difficulty Underweight Etiology: related to motor causes inability to consume sufficient energy to maintain appropriate weight Signs/Symptoms: as evidenced by abnormal swallow eval - need for Pureed diet with honey thick liquids per SLP BMI 13 NUTRITION INTERVENTIONS: 
Meals/Snacks: General/healthful diet, Modify diet/texture/consistency/nutrients   Supplements: Commercial supplement GOAL:  
Consume > 75% all meals + supplements in next 1-3 days; gain 0.5-1lb/week Cultural, Judaism, or Ethnic Dietary Needs: None EDUCATION & DISCHARGE NEEDS:  
 [] None Identified 
 [x] Identified and Education Provided/Documented- high calorie diet education provided to mother, verbalized understanding 
 [] Identified and Pt declined/was not appropriate [x] Interdisciplinary Care Plan Reviewed/Documented [x] Discharge Needs:    Continue supplements added to meals, high calorie diet at home 
 [] No Nutrition Related Discharge Needs NUTRITION RISK:  
Pt Is At Nutrition Risk  [x] No Nutrition Risk Identified  [] PT SEEN FOR:  
 [x]  MD Consult: []Calorie Count []Diabetic Diet Education []Diet Education []Electrolyte Management 
   [x]General Nutrition Management and Supplements []Management of Tube Feeding []TPN Recommendations []  RN Referral:  []MST score >=2 
   []Enteral/Parenteral Nutrition PTA []Pregnant: Gestational DM or Multigestation  
              [] Pressure Ulcer 
 
[]  Low BMI      []  Length of Stay       [] Dysphagia Diet         [] Ventilator 
[]  Follow-up Previous Recommendations: 
 [] Implemented          [] Not Implemented          [x] Not Applicable Previous Goal: 
 [] Met              [] Progressing Towards Goal              [] Not Progressing Towards Goal   [x] Not Applicable Rajat Mcwilliams RD Pager [phone] Phone [phone]

## 2018-12-31 NOTE — PROGRESS NOTES
Occupational Therapy Note: 
Orders acknowledged, chart reviewed, and spoke with nursing. Patient lives with his mother and has assistance from additional family members at home. Patient is dependent for ADLs and transfers to/from wheelchair. Family confirms they are able to adequately manage patient's care and do not have any equipment needs. Will complete order as patient is not appropriate for skilled OT/PT.    
Albert Mccormick, OTR/L

## 2019-01-01 LAB
ALBUMIN SERPL-MCNC: 2.4 G/DL (ref 3.5–5)
ALBUMIN/GLOB SERPL: 0.6 {RATIO} (ref 1.1–2.2)
ALP SERPL-CCNC: 97 U/L (ref 45–117)
ALT SERPL-CCNC: 11 U/L (ref 12–78)
ANION GAP SERPL CALC-SCNC: 7 MMOL/L (ref 5–15)
AST SERPL-CCNC: 8 U/L (ref 15–37)
ATRIAL RATE: 126 BPM
BASOPHILS # BLD: 0 K/UL (ref 0–0.1)
BASOPHILS NFR BLD: 0 % (ref 0–1)
BILIRUB SERPL-MCNC: 0.2 MG/DL (ref 0.2–1)
BUN SERPL-MCNC: 6 MG/DL (ref 6–20)
BUN/CREAT SERPL: 21 (ref 12–20)
CALCIUM SERPL-MCNC: 8.8 MG/DL (ref 8.5–10.1)
CALCULATED P AXIS, ECG09: 65 DEGREES
CALCULATED R AXIS, ECG10: 82 DEGREES
CALCULATED T AXIS, ECG11: 77 DEGREES
CHLORIDE SERPL-SCNC: 106 MMOL/L (ref 97–108)
CO2 SERPL-SCNC: 27 MMOL/L (ref 21–32)
CREAT SERPL-MCNC: 0.29 MG/DL (ref 0.7–1.3)
DIAGNOSIS, 93000: NORMAL
DIFFERENTIAL METHOD BLD: ABNORMAL
EOSINOPHIL # BLD: 0.1 K/UL (ref 0–0.4)
EOSINOPHIL NFR BLD: 2 % (ref 0–7)
ERYTHROCYTE [DISTWIDTH] IN BLOOD BY AUTOMATED COUNT: 12.7 % (ref 11.5–14.5)
GLOBULIN SER CALC-MCNC: 4.2 G/DL (ref 2–4)
GLUCOSE SERPL-MCNC: 105 MG/DL (ref 65–100)
HCT VFR BLD AUTO: 30.8 % (ref 36.6–50.3)
HGB BLD-MCNC: 9.6 G/DL (ref 12.1–17)
IMM GRANULOCYTES # BLD: 0 K/UL (ref 0–0.04)
IMM GRANULOCYTES NFR BLD AUTO: 1 % (ref 0–0.5)
LYMPHOCYTES # BLD: 0.9 K/UL (ref 0.8–3.5)
LYMPHOCYTES NFR BLD: 14 % (ref 12–49)
MAGNESIUM SERPL-MCNC: 1.6 MG/DL (ref 1.6–2.4)
MCH RBC QN AUTO: 31.9 PG (ref 26–34)
MCHC RBC AUTO-ENTMCNC: 31.2 G/DL (ref 30–36.5)
MCV RBC AUTO: 102.3 FL (ref 80–99)
MONOCYTES # BLD: 0.6 K/UL (ref 0–1)
MONOCYTES NFR BLD: 9 % (ref 5–13)
NEUTS SEG # BLD: 4.7 K/UL (ref 1.8–8)
NEUTS SEG NFR BLD: 74 % (ref 32–75)
NRBC # BLD: 0 K/UL (ref 0–0.01)
NRBC BLD-RTO: 0 PER 100 WBC
P-R INTERVAL, ECG05: 142 MS
PHOSPHATE SERPL-MCNC: 3.1 MG/DL (ref 2.6–4.7)
PLATELET # BLD AUTO: 154 K/UL (ref 150–400)
PMV BLD AUTO: 11 FL (ref 8.9–12.9)
POTASSIUM SERPL-SCNC: 4.4 MMOL/L (ref 3.5–5.1)
PROT SERPL-MCNC: 6.6 G/DL (ref 6.4–8.2)
Q-T INTERVAL, ECG07: 276 MS
QRS DURATION, ECG06: 72 MS
QTC CALCULATION (BEZET), ECG08: 399 MS
RBC # BLD AUTO: 3.01 M/UL (ref 4.1–5.7)
SODIUM SERPL-SCNC: 140 MMOL/L (ref 136–145)
VALPROATE SERPL-MCNC: 81 UG/ML (ref 50–100)
VENTRICULAR RATE, ECG03: 126 BPM
WBC # BLD AUTO: 6.4 K/UL (ref 4.1–11.1)

## 2019-01-01 PROCEDURE — 80053 COMPREHEN METABOLIC PANEL: CPT

## 2019-01-01 PROCEDURE — 74011250637 HC RX REV CODE- 250/637: Performed by: FAMILY MEDICINE

## 2019-01-01 PROCEDURE — 74011250636 HC RX REV CODE- 250/636: Performed by: INTERNAL MEDICINE

## 2019-01-01 PROCEDURE — 74011250636 HC RX REV CODE- 250/636: Performed by: FAMILY MEDICINE

## 2019-01-01 PROCEDURE — 83735 ASSAY OF MAGNESIUM: CPT

## 2019-01-01 PROCEDURE — 94760 N-INVAS EAR/PLS OXIMETRY 1: CPT

## 2019-01-01 PROCEDURE — 65270000029 HC RM PRIVATE

## 2019-01-01 PROCEDURE — C9113 INJ PANTOPRAZOLE SODIUM, VIA: HCPCS | Performed by: INTERNAL MEDICINE

## 2019-01-01 PROCEDURE — 36415 COLL VENOUS BLD VENIPUNCTURE: CPT

## 2019-01-01 PROCEDURE — 74011000258 HC RX REV CODE- 258: Performed by: FAMILY MEDICINE

## 2019-01-01 PROCEDURE — 80164 ASSAY DIPROPYLACETIC ACD TOT: CPT

## 2019-01-01 PROCEDURE — 74011250637 HC RX REV CODE- 250/637: Performed by: INTERNAL MEDICINE

## 2019-01-01 PROCEDURE — 84100 ASSAY OF PHOSPHORUS: CPT

## 2019-01-01 PROCEDURE — 85025 COMPLETE CBC W/AUTO DIFF WBC: CPT

## 2019-01-01 RX ADMIN — METRONIDAZOLE 500 MG: 500 INJECTION, SOLUTION INTRAVENOUS at 20:54

## 2019-01-01 RX ADMIN — DIVALPROEX SODIUM 500 MG: 250 TABLET, DELAYED RELEASE ORAL at 08:55

## 2019-01-01 RX ADMIN — ENOXAPARIN SODIUM 30 MG: 100 INJECTION SUBCUTANEOUS at 18:36

## 2019-01-01 RX ADMIN — Medication 400 MG: at 08:55

## 2019-01-01 RX ADMIN — PANTOPRAZOLE SODIUM 40 MG: 40 INJECTION, POWDER, FOR SOLUTION INTRAVENOUS at 08:55

## 2019-01-01 RX ADMIN — DIVALPROEX SODIUM 250 MG: 250 TABLET, DELAYED RELEASE ORAL at 17:33

## 2019-01-01 RX ADMIN — VANCOMYCIN HYDROCHLORIDE 750 MG: 10 INJECTION, POWDER, LYOPHILIZED, FOR SOLUTION INTRAVENOUS at 21:30

## 2019-01-01 RX ADMIN — SALINE LAXATIVE 1 ENEMA: 7; 19 ENEMA RECTAL at 11:00

## 2019-01-01 RX ADMIN — PHENOBARBITAL SODIUM 30 MG: 65 INJECTION INTRAMUSCULAR; INTRAVENOUS at 08:56

## 2019-01-01 RX ADMIN — CEFEPIME HYDROCHLORIDE 2 G: 2 INJECTION, POWDER, FOR SOLUTION INTRAVENOUS at 13:09

## 2019-01-01 RX ADMIN — ACETAMINOPHEN 650 MG: 650 SUPPOSITORY RECTAL at 21:52

## 2019-01-01 RX ADMIN — PHENOBARBITAL SODIUM 30 MG: 65 INJECTION INTRAMUSCULAR; INTRAVENOUS at 21:01

## 2019-01-01 RX ADMIN — DOCUSATE SODIUM 100 MG: 100 CAPSULE, LIQUID FILLED ORAL at 17:34

## 2019-01-01 RX ADMIN — Medication 10 ML: at 21:02

## 2019-01-01 RX ADMIN — DIVALPROEX SODIUM 500 MG: 250 TABLET, DELAYED RELEASE ORAL at 17:34

## 2019-01-01 RX ADMIN — POTASSIUM CHLORIDE 20 MEQ: 1.5 POWDER, FOR SOLUTION ORAL at 09:03

## 2019-01-01 RX ADMIN — Medication 10 ML: at 05:51

## 2019-01-01 RX ADMIN — METRONIDAZOLE 500 MG: 500 INJECTION, SOLUTION INTRAVENOUS at 08:56

## 2019-01-01 RX ADMIN — SODIUM CHLORIDE 1000 ML: 900 INJECTION, SOLUTION INTRAVENOUS at 23:41

## 2019-01-01 RX ADMIN — Medication 10 ML: at 14:00

## 2019-01-01 RX ADMIN — VANCOMYCIN HYDROCHLORIDE 750 MG: 10 INJECTION, POWDER, LYOPHILIZED, FOR SOLUTION INTRAVENOUS at 10:50

## 2019-01-01 RX ADMIN — DOCUSATE SODIUM 100 MG: 100 CAPSULE, LIQUID FILLED ORAL at 08:55

## 2019-01-01 NOTE — PROGRESS NOTES
Problem: Falls - Risk of 
Goal: *Absence of Falls Document Elliot Mitchell Fall Risk and appropriate interventions in the flowsheet. Outcome: Progressing Towards Goal 
Fall Risk Interventions: 
Mobility Interventions: Assess mobility with egress test, Bed/chair exit alarm, Communicate number of staff needed for ambulation/transfer, OT consult for ADLs, Patient to call before getting OOB, PT Consult for mobility concerns, PT Consult for assist device competence Mentation Interventions: Adequate sleep, hydration, pain control, Bed/chair exit alarm, Door open when patient unattended, Evaluate medications/consider consulting pharmacy, Eyeglasses and hearing aids, Family/sitter at bedside, Increase mobility, More frequent rounding, Reorient patient, Room close to nurse's station, Toileting rounds, Update white board Medication Interventions: Assess postural VS orthostatic hypotension, Bed/chair exit alarm, Evaluate medications/consider consulting pharmacy, Patient to call before getting OOB, Teach patient to arise slowly Elimination Interventions: Bed/chair exit alarm, Call light in reach, Elevated toilet seat, Patient to call for help with toileting needs, Toilet paper/wipes in reach, Toileting schedule/hourly rounds Problem: Pressure Injury - Risk of 
Goal: *Prevention of pressure injury Document Glenn Scale and appropriate interventions in the flowsheet. Outcome: Progressing Towards Goal 
Pressure Injury Interventions: 
Sensory Interventions: Assess changes in LOC, Assess need for specialty bed, Avoid rigorous massage over bony prominences, Chair cushion, Check visual cues for pain, Discuss PT/OT consult with provider, Float heels, Keep linens dry and wrinkle-free, Maintain/enhance activity level, Minimize linen layers, Monitor skin under medical devices, Pad between skin to skin, Turn and reposition approx. every two hours (pillows and wedges if needed) Moisture Interventions: Absorbent underpads, Apply protective barrier, creams and emollients, Assess need for specialty bed, Check for incontinence Q2 hours and as needed, Maintain skin hydration (lotion/cream), Minimize layers, Moisture barrier, Offer toileting Q_hr Activity Interventions: Assess need for specialty bed, Chair cushion, Increase time out of bed, Pressure redistribution bed/mattress(bed type), PT/OT evaluation Mobility Interventions: Assess need for specialty bed, Chair cushion, Float heels, HOB 30 degrees or less, Pressure redistribution bed/mattress (bed type), PT/OT evaluation, Turn and reposition approx. every two hours(pillow and wedges) Nutrition Interventions: Document food/fluid/supplement intake, Discuss nutritional consult with provider, Offer support with meals,snacks and hydration Friction and Shear Interventions: Apply protective barrier, creams and emollients, Foam dressings/transparent film/skin sealants, HOB 30 degrees or less, Lift team/patient mobility team, Minimize layers, Transferring/repositioning devices

## 2019-01-01 NOTE — PROGRESS NOTES
[address] [address] Pharmacy Dosing Services:  
 
Re:Cefepime Indication:Severe sepsis/recurrent aspiration pneumonia Day of Therapy 3 Non-Kinetic Antimicrobial Dosing Regimen: per renal protocol Current Regimen:  Cefepime 2 gm every 24 hours Cr adjusted for functional quadriplegia - eCrCl 40-50 ml/min - pt cleared Vancomycin loading dose well Adjustment in Therapy: Cefepime 2 gm every 12 hours Pharmacy to follow daily. Pharmacist Jazmyn Ardon                  [id number]

## 2019-01-01 NOTE — PROGRESS NOTES
[address] [address] Pharmacy Dosing Services: Vancomycin random trough evaluation Consult for antibiotic dosing of Vancomycin, Cefepime, Metronidazole by Dr. Tommy Martínez Pharmacist reviewed antibiotic appropriateness for 47year old , male  for indication of Sepsis, recurrent aspiration pneumonia in the setting of cerebral palsy, functional quadriplegia Day of Therapy 2 Trough goal: 15-20mcg/mL Random level: 2.3mcg/mL  drawn approximately 26.6hrs from the start of last dose. Level is subtherapeutic so will schedule doses of 750mg ivpb q12h. Pharmacy to continue to follow daily. Thank you, Lila Barnes, Pharm D. Contact: [phone]

## 2019-01-01 NOTE — PROGRESS NOTES
Neurology Progress Note Patient ID: 
Waqas Walton [id number] 
5 y.o. 
1965 Chief Complaint: None Subjective:  
Mom at bedside reports patient has not had any new seizure-like activity. He was able to eat today and does seem a little more alert. Objective: All records in [facility] reviewed and noted ROS: 
Per HPI All other 12 pt ROS negative Meds: 
Current Facility-Administered Medications Medication Dose Route Frequency  [START ON 1/2/2019] Vancomycin Trough 0830 1/2  1 Each Other ONCE  
 cefepime (MAXIPIME) 2 g in 0.9% sodium chloride (MBP/ADV) 100 mL  2 g IntraVENous Q12H  divalproex DR (DEPAKOTE) tablet 250 mg  250 mg Oral DAILY WITH DINNER  
 divalproex DR (DEPAKOTE) tablet 500 mg  500 mg Oral BID  potassium chloride (KLOR-CON) packet for solution 20 mEq  20 mEq Oral DAILY  magnesium oxide (MAG-OX) tablet 400 mg  400 mg Oral DAILY  vancomycin (VANCOCIN) 750 mg in 0.9% sodium chloride 250 mL IVPB  750 mg IntraVENous Q12H  
 sodium chloride (NS) flush 5-10 mL  5-10 mL IntraVENous PRN  
 albuterol-ipratropium (DUO-NEB) 2.5 MG-0.5 MG/3 ML  3 mL Nebulization Q4H PRN  pantoprazole (PROTONIX) 40 mg in sodium chloride 0.9% 10 mL injection  40 mg IntraVENous DAILY  PHENobarbital (LUMINAL) injection 30 mg  30 mg IntraVENous Q12H  
 sodium chloride (NS) flush 5-10 mL  5-10 mL IntraVENous Q8H  
 sodium chloride (NS) flush 5-10 mL  5-10 mL IntraVENous PRN  
 HYDROmorphone (DILAUDID) syringe 0.2 mg  0.2 mg IntraVENous Q4H PRN  
 naloxone (NARCAN) injection 0.4 mg  0.4 mg IntraVENous PRN  
 diphenhydrAMINE (BENADRYL) injection 12.5 mg  12.5 mg IntraVENous Q4H PRN  
 ondansetron (ZOFRAN) injection 4 mg  4 mg IntraVENous Q6H PRN  
 docusate sodium (COLACE) capsule 100 mg  100 mg Oral BID  acetaminophen (TYLENOL) suppository 650 mg  650 mg Rectal Q4H PRN  
 enoxaparin (LOVENOX) injection 30 mg  30 mg SubCUTAneous Q24H  metroNIDAZOLE (FLAGYL) IVPB premix 500 mg  500 mg IntraVENous Q12H Imaging: EEG with slowing but no seizure activity Lab Review Recent Results (from the past 24 hour(s)) PHENOBARBITAL Collection Time: 12/31/18  5:55 PM  
Result Value Ref Range Phenobarbital 25.9 15.0 - 40.0 ug/mL Guzman Pole Collection Time: 12/31/18  5:55 PM  
Result Value Ref Range Vancomycin, random 2.3 UG/ML  
PHOSPHORUS Collection Time: 01/01/19  1:14 AM  
Result Value Ref Range Phosphorus 3.1 2.6 - 4.7 MG/DL  
VALPROIC ACID Collection Time: 01/01/19  1:14 AM  
Result Value Ref Range Valproic acid 81 50 - 100 ug/ml MAGNESIUM Collection Time: 01/01/19  1:14 AM  
Result Value Ref Range Magnesium 1.6 1.6 - 2.4 mg/dL METABOLIC PANEL, COMPREHENSIVE Collection Time: 01/01/19  1:14 AM  
Result Value Ref Range Sodium 140 136 - 145 mmol/L Potassium 4.4 3.5 - 5.1 mmol/L Chloride 106 97 - 108 mmol/L  
 CO2 27 21 - 32 mmol/L Anion gap 7 5 - 15 mmol/L Glucose 105 (H) 65 - 100 mg/dL BUN 6 6 - 20 MG/DL Creatinine 0.29 (L) 0.70 - 1.30 MG/DL  
 BUN/Creatinine ratio 21 (H) 12 - 20 GFR est AA >60 >60 ml/min/1.73m2 GFR est non-AA >60 >60 ml/min/1.73m2 Calcium 8.8 8.5 - 10.1 MG/DL Bilirubin, total 0.2 0.2 - 1.0 MG/DL  
 ALT (SGPT) 11 (L) 12 - 78 U/L  
 AST (SGOT) 8 (L) 15 - 37 U/L Alk. phosphatase 97 45 - 117 U/L Protein, total 6.6 6.4 - 8.2 g/dL Albumin 2.4 (L) 3.5 - 5.0 g/dL Globulin 4.2 (H) 2.0 - 4.0 g/dL A-G Ratio 0.6 (L) 1.1 - 2.2    
CBC WITH AUTOMATED DIFF Collection Time: 01/01/19  1:14 AM  
Result Value Ref Range WBC 6.4 4.1 - 11.1 K/uL  
 RBC 3.01 (L) 4.10 - 5.70 M/uL HGB 9.6 (L) 12.1 - 17.0 g/dL HCT 30.8 (L) 36.6 - 50.3 % .3 (H) 80.0 - 99.0 FL  
 MCH 31.9 26.0 - 34.0 PG  
 MCHC 31.2 30.0 - 36.5 g/dL  
 RDW 12.7 11.5 - 14.5 % PLATELET 095 458 - 123 K/uL MPV 11.0 8.9 - 12.9 FL  
 NRBC 0.0 0  WBC ABSOLUTE NRBC 0.00 0.00 - 0.01 K/uL NEUTROPHILS 74 32 - 75 % LYMPHOCYTES 14 12 - 49 % MONOCYTES 9 5 - 13 % EOSINOPHILS 2 0 - 7 % BASOPHILS 0 0 - 1 % IMMATURE GRANULOCYTES 1 (H) 0.0 - 0.5 % ABS. NEUTROPHILS 4.7 1.8 - 8.0 K/UL  
 ABS. LYMPHOCYTES 0.9 0.8 - 3.5 K/UL  
 ABS. MONOCYTES 0.6 0.0 - 1.0 K/UL  
 ABS. EOSINOPHILS 0.1 0.0 - 0.4 K/UL  
 ABS. BASOPHILS 0.0 0.0 - 0.1 K/UL  
 ABS. IMM. GRANS. 0.0 0.00 - 0.04 K/UL  
 DF AUTOMATED Exam: 
Visit Vitals /73 (BP 1 Location: Left arm, BP Patient Position: At rest;Supine) Pulse 100 Temp 98.8 °F (37.1 °C) Resp 16 Ht 5' 6\" (1.676 m) Wt 36.6 kg (80 lb 11 oz) SpO2 98% BMI 13.02 kg/m² Gen: Well developed, patient lying in bed, nonverbal, some gurgling noises noted CV: RRR Lungs: non labored breathing Abd: non distending Neuro: Alert but nonverbal 
CN II-XII: PERRL, EOMI, face symmetric Motor: strength The patient is able to squeeze hands bilaterally and wiggle toes bilaterally Sensory: intact to LT Assessment:  
 
Patient Active Problem List  
Diagnosis Code  Sepsis(995.91) A41.9  
 UTI (urinary tract infection) N39.0  Phenobarbital toxicity T42.3X1A  Cerebral palsy ([address] G80.9  Scoliosis M41.9  Dehydration E86.0  Leukocytosis, unspecified D72.829  
 Encephalopathy G93.40  Fever R50.9  SIRS (systemic inflammatory response syndrome) (HCC) R65.10  Cough R05  Sepsis ([address] A41.9  Aspiration pneumonia ([address] J69.0  Hypotension I95.9  Seizure disorder ([address] G40.909  Severe sepsis (HCC) A41.9, R65.20  Acute metabolic encephalopathy A13.57  Right arm pain M79.601  Decubitus ulcers   
 
71-year-old gentleman with a history of cerebral palsy, quadriplegia and a seizure disorder who presents with fever and altered mental status.   The patient's altered state has been ongoing for several weeks and seems to be declining according to family. His valproic acid level has improved today. I suspect this being elevated could have contributed to his altered state. Family feels like he is more alert today. Phenobarbital level also within range. We will continue patient on decreased dose of Depakote. EEG was negative for seizures. Plan: 1. EEG negative for seizures 2. Will hold off on CT head at this time 3. Continue Depakote at decreased dose of 500 in a.m. and 750 in p.m. 4.  Continue phenobarbital 30 mg twice a day. 5.    Depakote level now within range. Phenobarbital level within range 6. Seizure precautions. 7.  Limit pain medications in this patient if possible. 8.  ST, OT, PT eval. 
9. VTE prophylaxis with Lovenox. 10.  Continue antibiotics per primary as attempted to define source of potential infection. No further neurological workup recommended at this time. Will sign off for please call further questions.  
 
 
Signed: 
George Bajwa MD 
1/1/2019 
4:32 PM

## 2019-01-01 NOTE — PROGRESS NOTES
TRANSFER - IN REPORT: 
 
Verbal report received from April RN(name) on Lizzette  being received from [facility](unit) for routine progression of care Report consisted of patients Situation, Background, Assessment and  
Recommendations(SBAR). Information from the following report(s) SBAR, Kardex, [facility] and Recent Results was reviewed with the receiving nurse. Opportunity for questions and clarification was provided. Assessment completed upon patients arrival to unit and care assumed.

## 2019-01-01 NOTE — PROGRESS NOTES
Primary Nurse Yodit Desouza and EULALIO Gonzalez performed a dual skin assessment on this patient Impairment noted- see wound doc flow sheet Glenn score is 10

## 2019-01-02 ENCOUNTER — APPOINTMENT (OUTPATIENT)
Dept: GENERAL RADIOLOGY | Age: 54
DRG: 871 | End: 2019-01-02
Attending: FAMILY MEDICINE
Payer: MEDICARE

## 2019-01-02 ENCOUNTER — APPOINTMENT (OUTPATIENT)
Dept: CT IMAGING | Age: 54
DRG: 871 | End: 2019-01-02
Attending: NURSE PRACTITIONER
Payer: MEDICARE

## 2019-01-02 LAB
ALBUMIN SERPL-MCNC: 2.1 G/DL (ref 3.5–5)
ALBUMIN/GLOB SERPL: 0.5 {RATIO} (ref 1.1–2.2)
ALP SERPL-CCNC: 88 U/L (ref 45–117)
ALT SERPL-CCNC: 11 U/L (ref 12–78)
ANION GAP SERPL CALC-SCNC: 6 MMOL/L (ref 5–15)
AST SERPL-CCNC: 7 U/L (ref 15–37)
BASOPHILS # BLD: 0 K/UL (ref 0–0.1)
BASOPHILS NFR BLD: 1 % (ref 0–1)
BILIRUB SERPL-MCNC: 0.2 MG/DL (ref 0.2–1)
BUN SERPL-MCNC: 19 MG/DL (ref 6–20)
BUN/CREAT SERPL: 58 (ref 12–20)
CALCIUM SERPL-MCNC: 8.2 MG/DL (ref 8.5–10.1)
CHLORIDE SERPL-SCNC: 110 MMOL/L (ref 97–108)
CO2 SERPL-SCNC: 27 MMOL/L (ref 21–32)
CREAT SERPL-MCNC: 0.33 MG/DL (ref 0.7–1.3)
DATE LAST DOSE: NORMAL
DIFFERENTIAL METHOD BLD: ABNORMAL
EOSINOPHIL # BLD: 0.2 K/UL (ref 0–0.4)
EOSINOPHIL NFR BLD: 4 % (ref 0–7)
ERYTHROCYTE [DISTWIDTH] IN BLOOD BY AUTOMATED COUNT: 13 % (ref 11.5–14.5)
GLOBULIN SER CALC-MCNC: 3.9 G/DL (ref 2–4)
GLUCOSE SERPL-MCNC: 109 MG/DL (ref 65–100)
HCT VFR BLD AUTO: 28.9 % (ref 36.6–50.3)
HGB BLD-MCNC: 8.9 G/DL (ref 12.1–17)
IMM GRANULOCYTES # BLD: 0.1 K/UL (ref 0–0.04)
IMM GRANULOCYTES NFR BLD AUTO: 1 % (ref 0–0.5)
LYMPHOCYTES # BLD: 1 K/UL (ref 0.8–3.5)
LYMPHOCYTES NFR BLD: 22 % (ref 12–49)
MCH RBC QN AUTO: 32.2 PG (ref 26–34)
MCHC RBC AUTO-ENTMCNC: 30.8 G/DL (ref 30–36.5)
MCV RBC AUTO: 104.7 FL (ref 80–99)
MONOCYTES # BLD: 0.7 K/UL (ref 0–1)
MONOCYTES NFR BLD: 15 % (ref 5–13)
NEUTS SEG # BLD: 2.6 K/UL (ref 1.8–8)
NEUTS SEG NFR BLD: 58 % (ref 32–75)
NRBC # BLD: 0 K/UL (ref 0–0.01)
NRBC BLD-RTO: 0 PER 100 WBC
PLATELET # BLD AUTO: 152 K/UL (ref 150–400)
PMV BLD AUTO: 11.3 FL (ref 8.9–12.9)
POTASSIUM SERPL-SCNC: 4.5 MMOL/L (ref 3.5–5.1)
PROT SERPL-MCNC: 6 G/DL (ref 6.4–8.2)
RBC # BLD AUTO: 2.76 M/UL (ref 4.1–5.7)
REPORTED DOSE,DOSE: NORMAL UNITS
REPORTED DOSE/TIME,TMG: 2100
SODIUM SERPL-SCNC: 143 MMOL/L (ref 136–145)
VALPROATE FREE SERPL-MCNC: 20.4 UG/ML (ref 6–22)
VANCOMYCIN TROUGH SERPL-MCNC: 9.6 UG/ML (ref 5–10)
WBC # BLD AUTO: 4.5 K/UL (ref 4.1–11.1)

## 2019-01-02 PROCEDURE — 74011250637 HC RX REV CODE- 250/637: Performed by: FAMILY MEDICINE

## 2019-01-02 PROCEDURE — 74011250636 HC RX REV CODE- 250/636: Performed by: FAMILY MEDICINE

## 2019-01-02 PROCEDURE — 85025 COMPLETE CBC W/AUTO DIFF WBC: CPT

## 2019-01-02 PROCEDURE — 74011250637 HC RX REV CODE- 250/637: Performed by: INTERNAL MEDICINE

## 2019-01-02 PROCEDURE — 97166 OT EVAL MOD COMPLEX 45 MIN: CPT

## 2019-01-02 PROCEDURE — 94760 N-INVAS EAR/PLS OXIMETRY 1: CPT

## 2019-01-02 PROCEDURE — 80053 COMPREHEN METABOLIC PANEL: CPT

## 2019-01-02 PROCEDURE — 74011250636 HC RX REV CODE- 250/636: Performed by: INTERNAL MEDICINE

## 2019-01-02 PROCEDURE — 80202 ASSAY OF VANCOMYCIN: CPT

## 2019-01-02 PROCEDURE — 71045 X-RAY EXAM CHEST 1 VIEW: CPT

## 2019-01-02 PROCEDURE — 74011000258 HC RX REV CODE- 258: Performed by: FAMILY MEDICINE

## 2019-01-02 PROCEDURE — C9113 INJ PANTOPRAZOLE SODIUM, VIA: HCPCS | Performed by: INTERNAL MEDICINE

## 2019-01-02 PROCEDURE — 77030015696

## 2019-01-02 PROCEDURE — 77030011256 HC DRSG MEPILEX <16IN NO BORD MOLN -A

## 2019-01-02 PROCEDURE — 65270000029 HC RM PRIVATE

## 2019-01-02 PROCEDURE — 70450 CT HEAD/BRAIN W/O DYE: CPT

## 2019-01-02 PROCEDURE — 97530 THERAPEUTIC ACTIVITIES: CPT

## 2019-01-02 PROCEDURE — 36415 COLL VENOUS BLD VENIPUNCTURE: CPT

## 2019-01-02 RX ADMIN — CEFEPIME HYDROCHLORIDE 2 G: 2 INJECTION, POWDER, FOR SOLUTION INTRAVENOUS at 23:58

## 2019-01-02 RX ADMIN — PHENOBARBITAL SODIUM 30 MG: 65 INJECTION INTRAMUSCULAR; INTRAVENOUS at 09:11

## 2019-01-02 RX ADMIN — DIVALPROEX SODIUM 500 MG: 250 TABLET, DELAYED RELEASE ORAL at 09:01

## 2019-01-02 RX ADMIN — Medication 10 ML: at 21:44

## 2019-01-02 RX ADMIN — CEFEPIME HYDROCHLORIDE 2 G: 2 INJECTION, POWDER, FOR SOLUTION INTRAVENOUS at 00:25

## 2019-01-02 RX ADMIN — PANTOPRAZOLE SODIUM 40 MG: 40 INJECTION, POWDER, FOR SOLUTION INTRAVENOUS at 09:05

## 2019-01-02 RX ADMIN — PHENOBARBITAL SODIUM 30 MG: 65 INJECTION INTRAMUSCULAR; INTRAVENOUS at 21:40

## 2019-01-02 RX ADMIN — DOCUSATE SODIUM 100 MG: 100 CAPSULE, LIQUID FILLED ORAL at 09:01

## 2019-01-02 RX ADMIN — METRONIDAZOLE 500 MG: 500 INJECTION, SOLUTION INTRAVENOUS at 21:41

## 2019-01-02 RX ADMIN — VANCOMYCIN HYDROCHLORIDE 750 MG: 10 INJECTION, POWDER, LYOPHILIZED, FOR SOLUTION INTRAVENOUS at 13:00

## 2019-01-02 RX ADMIN — METRONIDAZOLE 500 MG: 500 INJECTION, SOLUTION INTRAVENOUS at 09:06

## 2019-01-02 RX ADMIN — VANCOMYCIN HYDROCHLORIDE 750 MG: 10 INJECTION, POWDER, LYOPHILIZED, FOR SOLUTION INTRAVENOUS at 20:20

## 2019-01-02 RX ADMIN — DIVALPROEX SODIUM 250 MG: 250 TABLET, DELAYED RELEASE ORAL at 17:45

## 2019-01-02 RX ADMIN — DIVALPROEX SODIUM 500 MG: 250 TABLET, DELAYED RELEASE ORAL at 17:45

## 2019-01-02 RX ADMIN — CEFEPIME HYDROCHLORIDE 2 G: 2 INJECTION, POWDER, FOR SOLUTION INTRAVENOUS at 11:58

## 2019-01-02 RX ADMIN — Medication 10 ML: at 06:00

## 2019-01-02 RX ADMIN — Medication 10 ML: at 14:03

## 2019-01-02 RX ADMIN — POTASSIUM CHLORIDE 20 MEQ: 1.5 POWDER, FOR SOLUTION ORAL at 09:01

## 2019-01-02 RX ADMIN — Medication 400 MG: at 09:01

## 2019-01-02 RX ADMIN — DOCUSATE SODIUM 100 MG: 100 CAPSULE, LIQUID FILLED ORAL at 17:46

## 2019-01-02 RX ADMIN — ENOXAPARIN SODIUM 30 MG: 100 INJECTION SUBCUTANEOUS at 18:00

## 2019-01-02 NOTE — PROGRESS NOTES
Daily Progress Note: 1/2/2019 Isidoro Araiza MD 
 
Assessment/Plan:  
1) Severe sepsis/recurrent aspiration pneumonia: due to dysphagia, cerebral palsy. Patient's mom (POA), is not interested in PEG tube/feeding tubes or hospice. --IV Vanc/cefepime/flagyl   
2) Acute metabolic encephalopathy: more lethargic than baseline. Due to above.  
  
3) Seizure d/o:  
-Neuro managing and adjusting AEDs- signed off 1/1/19 
-EEG stable   
4) R arm pain/contracture:  chronic. Family said that pain is worse. --Will xray R arm, shoulder.   
- ortho ordered brace 
--PT/OT consulted but signed off because can't help- will have them reassess per ortho's notes   
5) Cerebral palsy/functional quad: cont supportive care 
  
6) Severe pro-jesus malnutrition: 
 -consult nutrition .  
-Family declined PEG tube   
7) Pressure ulcers: POA. Has decub ulcers, elbow and heel ulcers. -- wound care 8) Dysphagia - chronic - worsening:  Risks of feeding discussed with pts mother - insists diet be restarted. --speech rec stop thickener as it isn't helping 
  
Code: DNR - discussed with mother (POA). Not interested in Hospice 
   
 
Problem List: 
Problem List as of 1/2/2019 Date Reviewed: 12/30/2018 Codes Class Noted - Resolved * (Principal) Severe sepsis ([facility] ICD-10-CM: A41.9, R65.20 ICD-9-CM: 038.9, 995.92  12/30/2018 - Present Acute metabolic encephalopathy [id number]: G93.41 
ICD-9-CM: 348.31  12/30/2018 - Present Right arm pain ICD-10-CM: Q25.790 ICD-9-CM: 729.5  12/30/2018 - Present Decubitus ulcers ICD-10-CM: L89.90 ICD-9-CM: 707.00, 707.20  12/30/2018 - Present Sepsis ([facility] ICD-10-CM: A41.9 ICD-9-CM: 038.9, 995.91  7/8/2017 - Present Aspiration pneumonia ([facility] ICD-10-CM: J69.0 ICD-9-CM: 507.0  7/8/2017 - Present Hypotension ICD-10-CM: I95.9 ICD-9-CM: 458.9  7/8/2017 - Present Seizure disorder ([facility] ICD-10-CM: G40.909 ICD-9-CM: 345.90  7/8/2017 - Present Fever ICD-10-CM: R50.9 ICD-9-CM: 780.60  8/31/2014 - Present SIRS (systemic inflammatory response syndrome) (HCC) ICD-10-CM: R65.10 ICD-9-CM: 995.90  8/31/2014 - Present Cough ICD-10-CM: R05 ICD-9-CM: 786.2  8/31/2014 - Present Sepsis(995.91) ICD-10-CM: A41.9 ICD-9-CM: 995.91  6/15/2012 - Present UTI (urinary tract infection) ICD-10-CM: N39.0 ICD-9-CM: 599.0  6/15/2012 - Present Overview Signed 6/17/2012  3:58 PM by Peg Clark Escherichia coli    >100K col/mL Phenobarbital toxicity ICD-10-CM: T42.3X1A 
ICD-9-CM: 967.0, E980.1  6/15/2012 - Present Cerebral palsy ([address] ICD-10-CM: G80.9 ICD-9-CM: 343.9  6/15/2012 - Present Scoliosis ICD-10-CM: M41.9 ICD-9-CM: 737.30  6/15/2012 - Present Dehydration ICD-10-CM: E86.0 ICD-9-CM: 276.51  6/15/2012 - Present Leukocytosis, unspecified ICD-10-CM: M67.371 ICD-9-CM: 288.60  6/15/2012 - Present Encephalopathy ICD-10-CM: G93.40 ICD-9-CM: 348.30  6/15/2012 - Present Subjective:  
  46 yo hx of cerebral palsy, functional quadriplegia, dysphagia, seizure d/o, pressure ulcers, presented w/ severe sepsis, recurrent aspiration pneumonia, AMS. The patient cannot give a history. His mother (POA), stated that the patient has been less \"responsive\" over the past 2 days, associated with high fevers. She noticed that he has been having more difficulty eating and drinking. Per [facility], the patient has a hx of multiple admissions for aspiration pneumonia. In the ED, WBC was 12.1, temp 101.2.  (Dr Alf Kumari). 12/31:  Pts mother reports pt is better. She wants his diet resumed. Risks of aspiration/choking/death dicussed at length and pts mother understands but wants diet and po meds resumed anyway. She does not want PEG or any type of feeding tube. Tmax 101.1. Tnow 97.3. Cultures remain neg. Respir Viral panel neg. 19: OT/PT signed off because they can't help. EEG was fine, neuro adjusting AEDs. Ortho wants elbow/hand splinted. SLP removed thickener as it isn't helping. Mom notes he is uncomfortable today from constipation. Wants him to have enema. 19:  Temp yesterday. I suspect he is having small aspiration events - will recheck CXR. Discussed with pts mother and she wants \"something done\" but does not want to follow NPO/PEG/Speech therapy recs. She does not want to go to PT or have home PT but wants PT to show her what to do regarding flexor contractions. Tmax 99.7. BC neg so far. Had fluid bolus last night for hypotension. Review of Systems:  
Review of systems not obtained due to patient factors. Objective:  
Physical Exam:  
Visit Vitals BP 92/58 (BP 1 Location: Left arm, BP Patient Position: At rest;Hip tilt, right) Pulse 92 Temp 98 °F (36.7 °C) Resp 17 Ht 5' 6\" (1.676 m) Wt 80 lb 11 oz (36.6 kg) SpO2 96% BMI 13.02 kg/m² O2 Device: Room air Temp (24hrs), Av.3 °F (36.8 °C), Min:97.4 °F (36.3 °C), Max:99.7 °F (37.6 °C) No intake/output data recorded.  0701 -  1900 In: 6175 [P.O.:870; I.V.:1550] Out: 350 [Urine:350] General:  Alert, appears uncomfortable, appears stated age. Head:  Normocephalic, without obvious abnormality, atraumatic. Eyes:  Conjunctivae/corneas clear. PERRL, EOMs intact. Throat: Lips, mucosa, and tongue dry Neck: Supple, symmetrical, trachea midline, no adenopathy, thyroid: no enlargement/tenderness/nodules, no carotid bruit and no JVD. Back:   Symmetric, no curvature. ROM normal. No CVA tenderness. Lungs:   Clear to auscultation bilaterally. Chest wall:  No tenderness or deformity. Heart:  Regular rate and rhythm, S1, S2 normal, no murmur, click, rub or gallop. Abdomen:   Soft, non-tender. Abd distended, bowel sounds diminished. No masses,  No organomegaly. Extremities: no cyanosis or edema. No calf tenderness or cords. Skin: Chronic ulcers - right elbow, heels. Turgor fair. Neurologic: Not oriented. Does not follow commands. Contractures unchanged. Data Review:  
   
Recent Days: 
Recent Labs 01/02/19 0303 01/01/19 0114 12/31/18 0133 WBC 4.5 6.4 9.6 HGB 8.9* 9.6* 10.6* HCT 28.9* 30.8* 34.0*  
 154 146* Recent Labs 01/02/19 
0303 01/01/19 
0114 12/31/18 
0133  140 139  
K 4.5 4.4 3.7 * 106 106 CO2 27 27 26 * 105* 109* BUN 19 6 11 CREA 0.33* 0.29* 0.34* CA 8.2* 8.8 8.7 MG  --  1.6 1.4* PHOS  --  3.1 2.5* ALB 2.1* 2.4* 2.4* TBILI 0.2 0.2 0.3 SGOT 7* 8* 8* ALT 11* 11* 12 No results for input(s): PH, PCO2, PO2, HCO3, FIO2 in the last 72 hours. 24 Hour Results: 
Recent Results (from the past 24 hour(s)) METABOLIC PANEL, COMPREHENSIVE Collection Time: 01/02/19  3:03 AM  
Result Value Ref Range Sodium 143 136 - 145 mmol/L Potassium 4.5 3.5 - 5.1 mmol/L Chloride 110 (H) 97 - 108 mmol/L  
 CO2 27 21 - 32 mmol/L Anion gap 6 5 - 15 mmol/L Glucose 109 (H) 65 - 100 mg/dL BUN 19 6 - 20 MG/DL Creatinine 0.33 (L) 0.70 - 1.30 MG/DL  
 BUN/Creatinine ratio 58 (H) 12 - 20 GFR est AA >60 >60 ml/min/1.73m2 GFR est non-AA >60 >60 ml/min/1.73m2 Calcium 8.2 (L) 8.5 - 10.1 MG/DL Bilirubin, total 0.2 0.2 - 1.0 MG/DL  
 ALT (SGPT) 11 (L) 12 - 78 U/L  
 AST (SGOT) 7 (L) 15 - 37 U/L Alk. phosphatase 88 45 - 117 U/L Protein, total 6.0 (L) 6.4 - 8.2 g/dL Albumin 2.1 (L) 3.5 - 5.0 g/dL Globulin 3.9 2.0 - 4.0 g/dL A-G Ratio 0.5 (L) 1.1 - 2.2    
CBC WITH AUTOMATED DIFF Collection Time: 01/02/19  3:03 AM  
Result Value Ref Range WBC 4.5 4.1 - 11.1 K/uL  
 RBC 2.76 (L) 4.10 - 5.70 M/uL HGB 8.9 (L) 12.1 - 17.0 g/dL HCT 28.9 (L) 36.6 - 50.3 % .7 (H) 80.0 - 99.0 FL  
 MCH 32.2 26.0 - 34.0 PG  
 MCHC 30.8 30.0 - 36.5 g/dL RDW 13.0 11.5 - 14.5 % PLATELET 497 410 - 498 K/uL MPV 11.3 8.9 - 12.9 FL  
 NRBC 0.0 0  WBC ABSOLUTE NRBC 0.00 0.00 - 0.01 K/uL NEUTROPHILS 58 32 - 75 % LYMPHOCYTES 22 12 - 49 % MONOCYTES 15 (H) 5 - 13 % EOSINOPHILS 4 0 - 7 % BASOPHILS 1 0 - 1 % IMMATURE GRANULOCYTES 1 (H) 0.0 - 0.5 % ABS. NEUTROPHILS 2.6 1.8 - 8.0 K/UL  
 ABS. LYMPHOCYTES 1.0 0.8 - 3.5 K/UL  
 ABS. MONOCYTES 0.7 0.0 - 1.0 K/UL  
 ABS. EOSINOPHILS 0.2 0.0 - 0.4 K/UL  
 ABS. BASOPHILS 0.0 0.0 - 0.1 K/UL  
 ABS. IMM. GRANS. 0.1 (H) 0.00 - 0.04 K/UL  
 DF AUTOMATED Medications reviewed Current Facility-Administered Medications Medication Dose Route Frequency  Vancomycin Trough 0830 1/2  1 Each Other ONCE  
 cefepime (MAXIPIME) 2 g in 0.9% sodium chloride (MBP/ADV) 100 mL  2 g IntraVENous Q12H  divalproex DR (DEPAKOTE) tablet 250 mg  250 mg Oral DAILY WITH DINNER  
 divalproex DR (DEPAKOTE) tablet 500 mg  500 mg Oral BID  potassium chloride (KLOR-CON) packet for solution 20 mEq  20 mEq Oral DAILY  magnesium oxide (MAG-OX) tablet 400 mg  400 mg Oral DAILY  vancomycin (VANCOCIN) 750 mg in 0.9% sodium chloride 250 mL IVPB  750 mg IntraVENous Q12H  
 sodium chloride (NS) flush 5-10 mL  5-10 mL IntraVENous PRN  
 albuterol-ipratropium (DUO-NEB) 2.5 MG-0.5 MG/3 ML  3 mL Nebulization Q4H PRN  pantoprazole (PROTONIX) 40 mg in sodium chloride 0.9% 10 mL injection  40 mg IntraVENous DAILY  PHENobarbital (LUMINAL) injection 30 mg  30 mg IntraVENous Q12H  
 sodium chloride (NS) flush 5-10 mL  5-10 mL IntraVENous Q8H  
 sodium chloride (NS) flush 5-10 mL  5-10 mL IntraVENous PRN  
 HYDROmorphone (DILAUDID) syringe 0.2 mg  0.2 mg IntraVENous Q4H PRN  
 naloxone (NARCAN) injection 0.4 mg  0.4 mg IntraVENous PRN  
 diphenhydrAMINE (BENADRYL) injection 12.5 mg  12.5 mg IntraVENous Q4H PRN  
 ondansetron (ZOFRAN) injection 4 mg  4 mg IntraVENous Q6H PRN  
  docusate sodium (COLACE) capsule 100 mg  100 mg Oral BID  acetaminophen (TYLENOL) suppository 650 mg  650 mg Rectal Q4H PRN  
 enoxaparin (LOVENOX) injection 30 mg  30 mg SubCUTAneous Q24H  
 metroNIDAZOLE (FLAGYL) IVPB premix 500 mg  500 mg IntraVENous Q12H Care Plan discussed with: Patient/Family and Nurse Total time spent with patient: 30 minutes.  
Erasmo Chakraborty MD

## 2019-01-02 NOTE — PROGRESS NOTES
[facility]: [facility] Neurology [address] [place] [facility] [phone] Alee Bond, [facility] Name:   Annette Phelps Medical record #: [id number] Admission Date: 12/30/2018 Reason for Consult:  reconsulted for seizure like activity Subjective:  
Overnight events: 
 
Episodes this morning of \"full body jerks 5-6 times\". After which his mother says he had a period where he was verbal and much more alert. Discussed with mother, she wishes to wait another day or so before we adjust his medications. Objective: EEG:  Will hold for now CT head:  No acute process Assessment/Plan: 1. Rule out seizure/spell: · Neurochecks:  Every 4 hours in 
· Seizure precautions · Continue Depakote at decreased dose of 500 in a.m. and 750 in p.m  
· Continue phenobarbital 30 mg twice a day Thank you for allowing the Neurology Service the pleasure of participating in the care of your patient. This patient will be discussed with my collaborating care team physician Dr. Jordan Beltrán and she may have further recommendations regarding this patient's care. Attending Attestation:  
============================================================== Attending Addendum I have reviewed the documentation provided by the nurse practitioner, Katelynn Ma, discussed her findings, clinical impression, and the proposed management plans with regards to this patient's encounter. I have personally evaluated the patient, verify the history and confirm physical findings. Below are my additional findings: 
  
Subjective:  
Patient's mom reports that when he awoke this morning he had 5-6 episodes of full body jerks. She reports in the past that he had this upon awakening but she has not seen in many years. We did discuss the possibility of increasing his Depakote back to the previous dosing versus having a wait and see approach.   Mom would like to have a wait-and-see approach at this time as she feels like patient is improved today and doing better than he has in months. Objective:  
 
 
Imaging: CT head: No acute process EEG: Negative Exam: 
Visit Vitals /79 (BP 1 Location: Left arm, BP Patient Position: At rest) Pulse (!) 108 Temp 97.9 °F (36.6 °C) Resp 17 Ht 5' 6\" (1.676 m) Wt 40 kg (88 lb 2.9 oz) SpO2 94% BMI 14.23 kg/m² Gen: Well developed, patient lying in bed, nonverbal, some gurgling noises noted CV: RRR Lungs: non labored breathing Abd: non distending Neuro: Alert, appears to respond with some verbal responses today CN II-XII: PERRL, EOMI, face symmetric Motor: strength The patient is able to squeeze hands bilaterally and wiggle toes bilaterally Sensory: intact to LT Assessment/Plan:  
 
Patient Active Problem List  
Diagnosis Code  Sepsis(995.91) A41.9  
 UTI (urinary tract infection) N39.0  Phenobarbital toxicity T42.3X1A  Cerebral palsy ([address] G80.9  Scoliosis M41.9  Dehydration E86.0  Leukocytosis, unspecified D72.829  
 Encephalopathy G93.40  Fever R50.9  SIRS (systemic inflammatory response syndrome) (HCC) R65.10  Cough R05  Sepsis ([address] A41.9  Aspiration pneumonia ([address] J69.0  Hypotension I95.9  Seizure disorder ([address] G40.909  Severe sepsis (HCC) A41.9, R65.20  Acute metabolic encephalopathy B86.09  Right arm pain M79.601  Decubitus ulcers L89.90 This is a 71-year-old gentleman with a history of cerebral palsy, quadriplegia, and seizure disorder who was admitted for altered mental status. He appears to be improving. However this a.m. he did have some seizure-like activity with myoclonic jerks. EEG done yesterday was negative for seizure.   CT head was done today which was normal.  Discussed adjusting medications but mom would like to wait until tomorrow a.m. to see if he has any further activity prior to increasing back to the higher dose of Depakote. She feels like he is doing better on the lower dose. 1.  EEG yesterday negative for seizures. Will not repeat at this time 2. CT head negative as above 3. Continue Depakote 500/750 4. Continue phenobarbital 30 mg twice a day 5. Depakote level for a.m. 
6.  Antibiotics per primary Will increase Depakote back to 750/750 tomorrow if he has additional myoclonic activity upon awakening Will follow Signed: 
Ruddy Castaneda MD 
1/2/2019 Gen: Well developed, patient lying in bed, nonverbal, some gurgling noises noted CV: RRR Lungs: non labored breathing Abd: non distending Neuro: Alert but nonverbal 
CN II-XII: PERRL, EOMI, face symmetric Motor: strength The patient is able to squeeze hands bilaterally and wiggle toes bilaterally Sensory: intact to LT Current Facility-Administered Medications Medication Dose Route Frequency Provider Last Rate Last Dose  Vancomycin Trough 0830 1/2  1 Each Other ONCE Lenka Baird MD      
 cefepime (MAXIPIME) 2 g in 0.9% sodium chloride (MBP/ADV) 100 mL  2 g IntraVENous Q12H Lenka Baird  mL/hr at 01/02/19 0025 2 g at 01/02/19 0025  divalproex DR (DEPAKOTE) tablet 250 mg  250 mg Oral DAILY WITH Corby Bruno MD   250 mg at 01/01/19 1733  divalproex DR (DEPAKOTE) tablet 500 mg  500 mg Oral BID Armin Sidhu MD   500 mg at 01/02/19 0901  potassium chloride (KLOR-CON) packet for solution 20 mEq  20 mEq Oral DAILY Armin Sidhu MD   20 mEq at 01/02/19 0901  magnesium oxide (MAG-OX) tablet 400 mg  400 mg Oral DAILY Armin Sidhu MD   400 mg at 01/02/19 0876  vancomycin (VANCOCIN) 750 mg in 0.9% sodium chloride 250 mL IVPB  750 mg IntraVENous Q12H Jesús Escudero .7 mL/hr at 01/01/19 2130 750 mg at 01/01/19 2130  
 sodium chloride (NS) flush 5-10 mL  5-10 mL IntraVENous PRN Adrien Hui MD      
  albuterol-ipratropium (DUO-NEB) 2.5 MG-0.5 MG/3 ML  3 mL Nebulization Q4H PRN Jesús Escudero MD      
 pantoprazole (PROTONIX) 40 mg in sodium chloride 0.9% 10 mL injection  40 mg IntraVENous DAILY Jesús Escudero MD   40 mg at 01/02/19 3647  PHENobarbital (LUMINAL) injection 30 mg  30 mg IntraVENous Q12H Jesús Escudero MD   30 mg at 01/02/19 0911  
 sodium chloride (NS) flush 5-10 mL  5-10 mL IntraVENous Q8H Jesús Escudero MD   10 mL at 01/02/19 0600  
 sodium chloride (NS) flush 5-10 mL  5-10 mL IntraVENous PRN Jesús Escudero MD      
 HYDROmorphone (DILAUDID) syringe 0.2 mg  0.2 mg IntraVENous Q4H PRN Jesús Escudero MD   0.2 mg at 12/30/18 2256  naloxone (NARCAN) injection 0.4 mg  0.4 mg IntraVENous PRN Jesús Escudero MD      
 diphenhydrAMINE (BENADRYL) injection 12.5 mg  12.5 mg IntraVENous Q4H PRN Jesús Escudero MD      
 ondansetron (ZOFRAN) injection 4 mg  4 mg IntraVENous Q6H PRN Jesús Escudero MD      
 docusate sodium (COLACE) capsule 100 mg  100 mg Oral BID Jesús Escudero MD   100 mg at 01/02/19 0901  acetaminophen (TYLENOL) suppository 650 mg  650 mg Rectal Q4H PRN Jesús Escudero MD   650 mg at 01/01/19 2152  enoxaparin (LOVENOX) injection 30 mg  30 mg SubCUTAneous Q24H Jesús Escudero MD   30 mg at 01/01/19 1836  
 metroNIDAZOLE (FLAGYL) IVPB premix 500 mg  500 mg IntraVENous Q12H Jesús Escudero  mL/hr at 01/02/19 0906 500 mg at 01/02/19 9231 Recent Results (from the past 24 hour(s)) METABOLIC PANEL, COMPREHENSIVE Collection Time: 01/02/19  3:03 AM  
Result Value Ref Range Sodium 143 136 - 145 mmol/L Potassium 4.5 3.5 - 5.1 mmol/L Chloride 110 (H) 97 - 108 mmol/L  
 CO2 27 21 - 32 mmol/L Anion gap 6 5 - 15 mmol/L Glucose 109 (H) 65 - 100 mg/dL BUN 19 6 - 20 MG/DL Creatinine 0.33 (L) 0.70 - 1.30 MG/DL  
 BUN/Creatinine ratio 58 (H) 12 - 20 GFR est AA >60 >60 ml/min/1.73m2 GFR est non-AA >60 >60 ml/min/1.73m2  Calcium 8.2 (L) 8.5 - 10.1 MG/DL  
 Bilirubin, total 0.2 0.2 - 1.0 MG/DL  
 ALT (SGPT) 11 (L) 12 - 78 U/L  
 AST (SGOT) 7 (L) 15 - 37 U/L Alk. phosphatase 88 45 - 117 U/L Protein, total 6.0 (L) 6.4 - 8.2 g/dL Albumin 2.1 (L) 3.5 - 5.0 g/dL Globulin 3.9 2.0 - 4.0 g/dL A-G Ratio 0.5 (L) 1.1 - 2.2    
CBC WITH AUTOMATED DIFF Collection Time: 19  3:03 AM  
Result Value Ref Range WBC 4.5 4.1 - 11.1 K/uL  
 RBC 2.76 (L) 4.10 - 5.70 M/uL HGB 8.9 (L) 12.1 - 17.0 g/dL HCT 28.9 (L) 36.6 - 50.3 % .7 (H) 80.0 - 99.0 FL  
 MCH 32.2 26.0 - 34.0 PG  
 MCHC 30.8 30.0 - 36.5 g/dL  
 RDW 13.0 11.5 - 14.5 % PLATELET 250 296 - 065 K/uL MPV 11.3 8.9 - 12.9 FL  
 NRBC 0.0 0  WBC ABSOLUTE NRBC 0.00 0.00 - 0.01 K/uL NEUTROPHILS 58 32 - 75 % LYMPHOCYTES 22 12 - 49 % MONOCYTES 15 (H) 5 - 13 % EOSINOPHILS 4 0 - 7 % BASOPHILS 1 0 - 1 % IMMATURE GRANULOCYTES 1 (H) 0.0 - 0.5 % ABS. NEUTROPHILS 2.6 1.8 - 8.0 K/UL  
 ABS. LYMPHOCYTES 1.0 0.8 - 3.5 K/UL  
 ABS. MONOCYTES 0.7 0.0 - 1.0 K/UL  
 ABS. EOSINOPHILS 0.2 0.0 - 0.4 K/UL  
 ABS. BASOPHILS 0.0 0.0 - 0.1 K/UL  
 ABS. IMM. GRANS. 0.1 (H) 0.00 - 0.04 K/UL  
 DF AUTOMATED Visit Vitals BP 99/61 (BP 1 Location: Left arm, BP Patient Position: At rest) Pulse 81 Temp 97.7 °F (36.5 °C) Resp 17 Ht 5' 6\" (1.676 m) Wt 40 kg (88 lb 2.9 oz) SpO2 97% BMI 14.23 kg/m² O2 Device: Room air Temp (24hrs), Av.4 °F (36.9 °C), Min:97.6 °F (36.4 °C), Max:99.7 °F (37.6 °C) No intake/output data recorded.  1901 -  0700 In: 2180 [P.O.:630; I.V.:1550] Out: 350 [Urine:350] Patient education No driving, height/ladders, tub baths, pools, bodies of water, power tools until seizure free x 6 months If you have another seizure and the shaking last under 5 minutes, you are not injured, and you return to yourself quickly, no need to call EMS, just call my office at the number above.   If you have a seizure and the shaking lasts longer than 5 minutes, you are hurt or you have multiple seizures back to back without returning to yourself then call EMS. Care Plan discussed with: 
Patient x Family RN Care Manager Consultant/Specialist:    
 
 
ASPEN Martinez-BC

## 2019-01-02 NOTE — PROGRESS NOTES
[address] [address] Pharmacy Dosing Services: Antimicrobial Stewardship Daily Doc Consult for antibiotic dosing of Vancomycin, Cefepime, Metronidazole by Dr. Tatiana Diana (Attending Dr. Tr Champagne) Indication:Severe sepsis/recurrent aspiration pneumonia Day of Therapy 4 Ht Readings from Last 1 Encounters:  
12/31/18 167.6 cm (66\") Wt Readings from Last 1 Encounters:  
01/01/19 36.6 kg (80 lb 11 oz) Vancomycin therapy: 
? Current maintenance dose: 750 (mg) every 12 hours (frequency). ? Dose calculated to approximate a therapeutic trough of 15-20 mcg/mL ? SCr 0.33 adjusted to 1.0 (for calculation purposes) to account for quadriplegia, WBC WNL, afebrile ? Last level obtained 11.5 hours post-dose was 9.6 mcg/mL, extrapolated to 9.03 mcg/mL (SUBtherapeutic) ? Plan for level / Adjustment in Therapy: Vancomycin 750 mg IV Q8H. This yields a predicted trough of 18.3 mcg/mL with an AUC of 553 
? Adjusted infusion time from 90 minutes to 60 minutes per protocol ? Dose administration notes:   Doses given appropriately as scheduled, however 0900 dose this morning has NOT been given yet. Will schedule first dose of new regimen for noon. Non-Kinetic Antimicrobial Dosing Regimen: per renal protocol ? Current Regimen:  Cefepime 2 gm Q12H, KABA 500 mg every 12 hours ? Cr adjusted for function quadriplegia - eCrCl 40-50 ml/min - pt clearing Vancomycin loading dose well ? Adjustment in Therapy: Continue current ? Dose administration notes:   Doses given appropriately as scheduled Other Antimicrobial  
(not dosed by pharmacist) none Cultures 12/30 Blood x2 - NGsf (pending) 12/30 Urine - on hold 12/30 Resp panel - not detected (FINAL) Serum Creatinine Lab Results Component Value Date/Time Creatinine 0.33 (L) 01/02/2019 03:03 AM  
 Creatinine (POC) 0.4 (L) 08/02/2012 08:18 PM  
  
  
Creatinine Clearance Estimated Creatinine Clearance: 148.1 mL/min (A) (based on SCr of 0.33 mg/dL (L)). Temp Temp: 97.7 °F (36.5 °C) WBC Lab Results Component Value Date/Time WBC 4.5 01/02/2019 03:03 AM  
 
  
H/H Lab Results Component Value Date/Time HGB 8.9 (L) 01/02/2019 03:03 AM  
 
  
Platelets Lab Results Component Value Date/Time PLATELET 433 23/36/1332 03:03 AM  
 
  
 
Thank you for the consult, 
Edgardo Mckeon D, [address]

## 2019-01-02 NOTE — PROGRESS NOTES
Per chart review, recheck on CXR. Pt's mother does not want Pt to have home PT. Neurology has been consulted. Emilee Chan, BS, [facility]

## 2019-01-02 NOTE — PROGRESS NOTES
Bedside and Verbal shift change report given to Jeancarlos Alejandro RN (oncoming nurse) by Stacy Ayala (offgoing nurse). Report included the following information SBAR, Kardex, Procedure Summary, Intake/Output, MAR, Recent Results and Med Rec Status.

## 2019-01-02 NOTE — PROGRESS NOTES
2315: BP is 86/54 HR is 107 I called Dr. Jayjay Resendiz and orders for sodium chloride 1000 ml given. 2341: Sodium chloride bolus started. Will continue to monitor. 0120: Sodium chloride bolus completed. Will recheck BP in 30 minutes 0220: BP is 95/64. HR is 104 . Will continue to monitor

## 2019-01-02 NOTE — PROGRESS NOTES
Bedside shift change report given to 39[address] Yaya Granados (oncoming nurse) by Ted Morley (offgoing nurse). Report included the following information SBAR, Kardex, MAR and Recent Results.

## 2019-01-02 NOTE — PROGRESS NOTES
Problem: Falls - Risk of 
Goal: *Absence of Falls Document Altagracia Thapa Fall Risk and appropriate interventions in the flowsheet. Outcome: Progressing Towards Goal 
Fall Risk Interventions: 
Mobility Interventions: Communicate number of staff needed for ambulation/transfer, Bed/chair exit alarm, Utilize gait belt for transfers/ambulation Mentation Interventions: Adequate sleep, hydration, pain control, Evaluate medications/consider consulting pharmacy, Family/sitter at bedside, More frequent rounding, Update white board Medication Interventions: Bed/chair exit alarm, Evaluate medications/consider consulting pharmacy Elimination Interventions: Call light in reach, Bed/chair exit alarm, Toileting schedule/hourly rounds Problem: Pressure Injury - Risk of 
Goal: *Prevention of pressure injury Document Glenn Scale and appropriate interventions in the flowsheet. Outcome: Progressing Towards Goal 
Pressure Injury Interventions: 
Sensory Interventions: Pressure redistribution bed/mattress (bed type), Avoid rigorous massage over bony prominences, Keep linens dry and wrinkle-free, Minimize linen layers, Check visual cues for pain, Turn and reposition approx. every two hours (pillows and wedges if needed), Monitor skin under medical devices Moisture Interventions: Absorbent underpads, Internal/External urinary devices, Limit adult briefs, Minimize layers, Maintain skin hydration (lotion/cream), Moisture barrier, Check for incontinence Q2 hours and as needed, Apply protective barrier, creams and emollients Activity Interventions: Pressure redistribution bed/mattress(bed type) Mobility Interventions: Pressure redistribution bed/mattress (bed type), Float heels, HOB 30 degrees or less, Turn and reposition approx. every two hours(pillow and wedges) Nutrition Interventions: Document food/fluid/supplement intake Friction and Shear Interventions: Apply protective barrier, creams and emollients, Lift team/patient mobility team, Lift sheet, HOB 30 degrees or less, Minimize layers

## 2019-01-02 NOTE — PROGRESS NOTES
Occupational Therapy EVALUATION/discharge Patient: Tania Chapin (47 y.o. male) Date: 1/2/2019 Primary Diagnosis: Severe sepsis ([address] Precautions:     
 
ASSESSMENT:  
Based on the objective data described below, the patient presents with hospital admission secondary to severe sepsis. Patient at baseline for ADL tasks requiring total assistance, and receives all care from family, with mother as primary secondary to CP. Patient with new OT order to assess need for right wrist splint and light ROM of elbow secondary to contractures. Patient seen, in bed, mother present in room and provides information regarding RUE. Patient tolerated gentle stretch to R elbow and OT able to achieve elbow to nearly 90 degrees. OT instructed patient mother regarding slow, passive stretch to left elbow to allow continued ability for dressing/bathing. Patient verbalized understanding and reports she or family will be able to assist.  
Regarding right wrist splint, patient assessed and noted patient able to achieve nearly neutral position at wrist with passive slow stretch. However, within stretching attempt, patient with increased spasticity, causing flexion of elbow, wrist, and hand. With current spasticity, recommend against using rigid resting hand splint as patient for increased risk of pressure sores and friction, and inability to doff splint as needed. Patient may benefit from splint such as SaeboStretch to overcome clinical concern of rigid splint. Such splint would allow patient to move through flexion with increased tone and allow wrist/hand to return to desired resting position upon relaxation. At this time, acute care OT unable to provide recommended splint and plan would be for patient to visit outpatient clinic for fit, wear schedule and family training. Per patient mother, it is very difficult to transport patient to appointments, therefore outpatient option not feasible. Educated patient mother regarding gentle stretch at wrist/digits and she is agreeable to perform. Further skilled acute occupational therapy is not indicated at this time. Discharge Recommendations: none (see assessment) Further Equipment Recommendations for Discharge: none noted SUBJECTIVE:  
Patient stated . OBJECTIVE DATA SUMMARY:  
HISTORY:  
Past Medical History:  
Diagnosis Date  Aspiration pneumonia ([facility]  Cerebral palsy ([facility]  Ill-defined condition   
 dsyphasia  Scoliosis  Seizure disorder ([facility]  Seizures ([facility] Past Surgical History:  
Procedure Laterality Date  HX HEENT Prior Level of Function/Environment/Context: total care Occupations in which the patient is/was successful, what are the barriers preventing that success:  
Performance Patterns (routines, roles, habits, and rituals):  
Personal Interests and/or values:  
Expanded or extensive additional review of patient history:  
 
Home Situation Home Environment: Private residence One/Two Story Residence: One story Living Alone: No 
Support Systems: Family member(s) Patient Expects to be Discharged to[de-identified] Private residence Current DME Used/Available at Home: Wheelchair, Hospital bed EXAMINATION OF PERFORMANCE DEFICITS: 
Cognitive/Behavioral Status: 
Neurologic State: Alert Orientation Level: Unable to verbalize Cognition: Unable to assess (comment) Edema: none noted Hearing: Auditory Auditory Impairment: None Vision/Perceptual:   
    
    
    
  
    
    
  
Range of Motion: 
AROM: Grossly decreased, non-functional 
PROM: Generally decreased, functional 
  
  
  
  
  
  
Strength: 
Strength: Grossly decreased, non-functional 
  
  
  
  
 
Coordination: 
Coordination: Grossly decreased, non-functional 
Fine Motor Skills-Upper: Left Impaired;Right Impaired Gross Motor Skills-Upper: Left Impaired;Right Impaired Tone & Sensation: 
Tone: Abnormal 
  
  
  
  
  
  
  
 Balance: 
Sitting: Impaired Standing: (not tested ) Functional Mobility and Transfers for ADLs:Bed Mobility: 
 total assist  
 
Transfers: 
 total assist 
 
ADL Assessment: 
Feeding: Total assistance Oral Facial Hygiene/Grooming: Total assistance Bathing: Total assistance Upper Body Dressing: Total assistance Lower Body Dressing: Total assistance Toileting: Total assistance ADL Intervention and task modifications: 
  
 
  
 
Therapeutic Exercise: Functional Measure: 
Barthel Index: 
 
Bathin Bladder: 0 Bowels: 0 Groomin Dressin Feedin Mobility: 0 Stairs: 0 Toilet Use: 0 Transfer (Bed to Chair and Back): 0 Total: 0 The Barthel ADL Index: Guidelines 1. The index should be used as a record of what a patient does, not as a record of what a patient could do. 2. The main aim is to establish degree of independence from any help, physical or verbal, however minor and for whatever reason. 3. The need for supervision renders the patient not independent. 4. A patient's performance should be established using the best available evidence. Asking the patient, friends/relatives and nurses are the usual sources, but direct observation and common sense are also important. However direct testing is not needed. 5. Usually the patient's performance over the preceding 24-48 hours is important, but occasionally longer periods will be relevant. 6. Middle categories imply that the patient supplies over 50 per cent of the effort. 7. Use of aids to be independent is allowed. Eyal Sanchez., Barthel, D.W. (3781). Functional evaluation: the Barthel Index. [address] [facility] (14)2. ASHKAN Red, Bertha Miller., Aleyda Ulloa.[facility], [address] [place] [address] (). Measuring the change indisability after inpatient rehabilitation; comparison of the responsiveness of the Barthel Index and Functional [place] Measure.  Journal of Neurology, Neurosurgery, and Psychiatry, 66(4), 373-911. LEX Rodriges, TAI Aguilar, & Loretta Caban M.A. (2004.) Assessment of post-stroke quality of life in cost-effectiveness studies: The usefulness of the Barthel Index and the EuroQoL-5D. [facility], 13, 223-81 Occupational Therapy Evaluation Charge Determination History Examination Decision-Making HIGH Complexity : Extensive review of history including physical, cognitive and psychosocial history  MEDIUM Complexity : 3-5 performance deficits relating to physical, cognitive , or psychosocial skils that result in activity limitations and / or participation restrictions MEDIUM Complexity : Patient may present with comorbidities that affect occupational performnce. Miniml to moderate modification of tasks or assistance (eg, physical or verbal ) with assesment(s) is necessary to enable patient to complete evaluation Based on the above components, the patient evaluation is determined to be of the following complexity level: HIGH Pain: 
  
  
  
  
  
  
Activity Tolerance:  
 
Please refer to the flowsheet for vital signs taken during this treatment. After treatment:  
[]  Patient left in no apparent distress sitting up in chair 
[x]  Patient left in no apparent distress in bed 
[]  Call bell left within reach- mother present in room [x]  Nursing notified 
[x]  Caregiver present 
[]  Bed alarm activated COMMUNICATION/EDUCATION:  
Communication/Collaboration: 
[]      Home safety education was provided and the patient/caregiver indicated understanding. []      Patient/family have participated as able and agree with findings and recommendations. [x]      Patient is unable to participate in plan of care at this time. Findings and recommendations were discussed with: Registered Nurse JUNE Webster/CHRIS Time Calculation: 29 mins

## 2019-01-03 LAB
ALBUMIN SERPL-MCNC: 2.4 G/DL (ref 3.5–5)
ALBUMIN/GLOB SERPL: 0.6 {RATIO} (ref 1.1–2.2)
ALP SERPL-CCNC: 88 U/L (ref 45–117)
ALT SERPL-CCNC: 11 U/L (ref 12–78)
ANION GAP SERPL CALC-SCNC: 9 MMOL/L (ref 5–15)
AST SERPL-CCNC: 11 U/L (ref 15–37)
BASOPHILS # BLD: 0 K/UL (ref 0–0.1)
BASOPHILS NFR BLD: 1 % (ref 0–1)
BILIRUB SERPL-MCNC: 0.2 MG/DL (ref 0.2–1)
BUN SERPL-MCNC: 12 MG/DL (ref 6–20)
BUN/CREAT SERPL: 43 (ref 12–20)
CALCIUM SERPL-MCNC: 8.8 MG/DL (ref 8.5–10.1)
CHLORIDE SERPL-SCNC: 104 MMOL/L (ref 97–108)
CO2 SERPL-SCNC: 28 MMOL/L (ref 21–32)
CREAT SERPL-MCNC: 0.28 MG/DL (ref 0.7–1.3)
DATE LAST DOSE: ABNORMAL
DIFFERENTIAL METHOD BLD: ABNORMAL
EOSINOPHIL # BLD: 0 K/UL (ref 0–0.4)
EOSINOPHIL NFR BLD: 1 % (ref 0–7)
ERYTHROCYTE [DISTWIDTH] IN BLOOD BY AUTOMATED COUNT: 12.5 % (ref 11.5–14.5)
GLOBULIN SER CALC-MCNC: 3.8 G/DL (ref 2–4)
GLUCOSE SERPL-MCNC: 95 MG/DL (ref 65–100)
HCT VFR BLD AUTO: 28 % (ref 36.6–50.3)
HGB BLD-MCNC: 8.9 G/DL (ref 12.1–17)
IMM GRANULOCYTES # BLD: 0 K/UL
IMM GRANULOCYTES NFR BLD AUTO: 0 %
LYMPHOCYTES # BLD: 1.1 K/UL (ref 0.8–3.5)
LYMPHOCYTES NFR BLD: 38 % (ref 12–49)
MCH RBC QN AUTO: 32.6 PG (ref 26–34)
MCHC RBC AUTO-ENTMCNC: 31.8 G/DL (ref 30–36.5)
MCV RBC AUTO: 102.6 FL (ref 80–99)
MONOCYTES # BLD: 0 K/UL (ref 0–1)
MONOCYTES NFR BLD: 1 % (ref 5–13)
NEUTS SEG # BLD: 1.8 K/UL (ref 1.8–8)
NEUTS SEG NFR BLD: 59 % (ref 32–75)
NRBC # BLD: 0 K/UL (ref 0–0.01)
NRBC BLD-RTO: 0 PER 100 WBC
PHENOBARB SERPL-MCNC: 22.6 UG/ML (ref 15–40)
PLATELET # BLD AUTO: 162 K/UL (ref 150–400)
PMV BLD AUTO: 10.7 FL (ref 8.9–12.9)
POTASSIUM SERPL-SCNC: 4.3 MMOL/L (ref 3.5–5.1)
PROT SERPL-MCNC: 6.2 G/DL (ref 6.4–8.2)
RBC # BLD AUTO: 2.73 M/UL (ref 4.1–5.7)
RBC MORPH BLD: ABNORMAL
REPORTED DOSE,DOSE: ABNORMAL UNITS
REPORTED DOSE/TIME,TMG: 2000
SODIUM SERPL-SCNC: 141 MMOL/L (ref 136–145)
VALPROATE SERPL-MCNC: 65 UG/ML (ref 50–100)
VANCOMYCIN TROUGH SERPL-MCNC: 15.1 UG/ML (ref 5–10)
WBC # BLD AUTO: 2.9 K/UL (ref 4.1–11.1)

## 2019-01-03 PROCEDURE — 74011250636 HC RX REV CODE- 250/636: Performed by: INTERNAL MEDICINE

## 2019-01-03 PROCEDURE — 80164 ASSAY DIPROPYLACETIC ACD TOT: CPT

## 2019-01-03 PROCEDURE — 85025 COMPLETE CBC W/AUTO DIFF WBC: CPT

## 2019-01-03 PROCEDURE — 74011000258 HC RX REV CODE- 258: Performed by: FAMILY MEDICINE

## 2019-01-03 PROCEDURE — 74011250637 HC RX REV CODE- 250/637: Performed by: INTERNAL MEDICINE

## 2019-01-03 PROCEDURE — 80202 ASSAY OF VANCOMYCIN: CPT

## 2019-01-03 PROCEDURE — C9113 INJ PANTOPRAZOLE SODIUM, VIA: HCPCS | Performed by: INTERNAL MEDICINE

## 2019-01-03 PROCEDURE — 77030011256 HC DRSG MEPILEX <16IN NO BORD MOLN -A

## 2019-01-03 PROCEDURE — 74011250636 HC RX REV CODE- 250/636: Performed by: FAMILY MEDICINE

## 2019-01-03 PROCEDURE — 65270000029 HC RM PRIVATE

## 2019-01-03 PROCEDURE — 36415 COLL VENOUS BLD VENIPUNCTURE: CPT

## 2019-01-03 PROCEDURE — 74011250637 HC RX REV CODE- 250/637: Performed by: FAMILY MEDICINE

## 2019-01-03 PROCEDURE — 80053 COMPREHEN METABOLIC PANEL: CPT

## 2019-01-03 PROCEDURE — 94760 N-INVAS EAR/PLS OXIMETRY 1: CPT

## 2019-01-03 PROCEDURE — C1758 CATHETER, URETERAL: HCPCS

## 2019-01-03 PROCEDURE — 80184 ASSAY OF PHENOBARBITAL: CPT

## 2019-01-03 RX ORDER — AMOXICILLIN AND CLAVULANATE POTASSIUM 875; 125 MG/1; MG/1
1 TABLET, FILM COATED ORAL 2 TIMES DAILY WITH MEALS
Status: DISCONTINUED | OUTPATIENT
Start: 2019-01-03 | End: 2019-01-04 | Stop reason: HOSPADM

## 2019-01-03 RX ADMIN — CEFEPIME HYDROCHLORIDE 2 G: 2 INJECTION, POWDER, FOR SOLUTION INTRAVENOUS at 12:31

## 2019-01-03 RX ADMIN — Medication 10 ML: at 22:00

## 2019-01-03 RX ADMIN — PHENOBARBITAL SODIUM 30 MG: 65 INJECTION INTRAMUSCULAR; INTRAVENOUS at 20:40

## 2019-01-03 RX ADMIN — METRONIDAZOLE 500 MG: 500 INJECTION, SOLUTION INTRAVENOUS at 09:03

## 2019-01-03 RX ADMIN — DIVALPROEX SODIUM 250 MG: 250 TABLET, DELAYED RELEASE ORAL at 17:52

## 2019-01-03 RX ADMIN — Medication 400 MG: at 09:03

## 2019-01-03 RX ADMIN — PHENOBARBITAL SODIUM 30 MG: 65 INJECTION INTRAMUSCULAR; INTRAVENOUS at 09:03

## 2019-01-03 RX ADMIN — DIVALPROEX SODIUM 500 MG: 250 TABLET, DELAYED RELEASE ORAL at 09:03

## 2019-01-03 RX ADMIN — Medication 10 ML: at 06:07

## 2019-01-03 RX ADMIN — DIVALPROEX SODIUM 500 MG: 250 TABLET, DELAYED RELEASE ORAL at 17:51

## 2019-01-03 RX ADMIN — VANCOMYCIN HYDROCHLORIDE 750 MG: 10 INJECTION, POWDER, LYOPHILIZED, FOR SOLUTION INTRAVENOUS at 06:04

## 2019-01-03 RX ADMIN — POTASSIUM CHLORIDE 20 MEQ: 1.5 POWDER, FOR SOLUTION ORAL at 09:03

## 2019-01-03 RX ADMIN — PANTOPRAZOLE SODIUM 40 MG: 40 INJECTION, POWDER, FOR SOLUTION INTRAVENOUS at 09:03

## 2019-01-03 RX ADMIN — ENOXAPARIN SODIUM 30 MG: 100 INJECTION SUBCUTANEOUS at 18:44

## 2019-01-03 RX ADMIN — AMOXICILLIN AND CLAVULANATE POTASSIUM 1 TABLET: 875; 125 TABLET, FILM COATED ORAL at 17:50

## 2019-01-03 RX ADMIN — Medication 10 ML: at 16:00

## 2019-01-03 RX ADMIN — DOCUSATE SODIUM 100 MG: 100 CAPSULE, LIQUID FILLED ORAL at 09:03

## 2019-01-03 NOTE — PROGRESS NOTES
Neurology Progress Note Patient ID: 
Charbel Rudolph [id number] 
4 y.o. 
1965 Chief Complaint: none Subjective: Pt with two episodes of jerks when awakening this AM. Brother was at the bedside. He and mom both agree they prefer the lower dose of depakote due to patient being more alert with this change. Objective: All records in [facility] reviewed and noted ROS: 
Per HPI All other 12 pt ROS negative Meds: 
Current Facility-Administered Medications Medication Dose Route Frequency  vancomycin (VANCOCIN) 750 mg in 0.9% sodium chloride 250 mL IVPB  750 mg IntraVENous Q8H  
 cefepime (MAXIPIME) 2 g in 0.9% sodium chloride (MBP/ADV) 100 mL  2 g IntraVENous Q12H  divalproex DR (DEPAKOTE) tablet 250 mg  250 mg Oral DAILY WITH DINNER  
 divalproex DR (DEPAKOTE) tablet 500 mg  500 mg Oral BID  potassium chloride (KLOR-CON) packet for solution 20 mEq  20 mEq Oral DAILY  magnesium oxide (MAG-OX) tablet 400 mg  400 mg Oral DAILY  sodium chloride (NS) flush 5-10 mL  5-10 mL IntraVENous PRN  
 albuterol-ipratropium (DUO-NEB) 2.5 MG-0.5 MG/3 ML  3 mL Nebulization Q4H PRN  pantoprazole (PROTONIX) 40 mg in sodium chloride 0.9% 10 mL injection  40 mg IntraVENous DAILY  PHENobarbital (LUMINAL) injection 30 mg  30 mg IntraVENous Q12H  
 sodium chloride (NS) flush 5-10 mL  5-10 mL IntraVENous Q8H  
 sodium chloride (NS) flush 5-10 mL  5-10 mL IntraVENous PRN  
 HYDROmorphone (DILAUDID) syringe 0.2 mg  0.2 mg IntraVENous Q4H PRN  
 naloxone (NARCAN) injection 0.4 mg  0.4 mg IntraVENous PRN  
 diphenhydrAMINE (BENADRYL) injection 12.5 mg  12.5 mg IntraVENous Q4H PRN  
 ondansetron (ZOFRAN) injection 4 mg  4 mg IntraVENous Q6H PRN  
 docusate sodium (COLACE) capsule 100 mg  100 mg Oral BID  acetaminophen (TYLENOL) suppository 650 mg  650 mg Rectal Q4H PRN  
 enoxaparin (LOVENOX) injection 30 mg  30 mg SubCUTAneous Q24H  metroNIDAZOLE (FLAGYL) IVPB premix 500 mg  500 mg IntraVENous Q12H Imaging: CT head neg EEG with slowing Lab Review Recent Results (from the past 24 hour(s)) Stuart Collins Collection Time: 01/02/19  9:07 AM  
Result Value Ref Range Vancomycin,trough 9.6 5.0 - 10.0 ug/mL Reported dose date: 01320705 Reported dose time: 2100 Reported dose: 750 MG UNITS  
CBC WITH AUTOMATED DIFF Collection Time: 01/03/19  4:27 AM  
Result Value Ref Range WBC 2.9 (L) 4.1 - 11.1 K/uL  
 RBC 2.73 (L) 4.10 - 5.70 M/uL HGB 8.9 (L) 12.1 - 17.0 g/dL HCT 28.0 (L) 36.6 - 50.3 % .6 (H) 80.0 - 99.0 FL  
 MCH 32.6 26.0 - 34.0 PG  
 MCHC 31.8 30.0 - 36.5 g/dL  
 RDW 12.5 11.5 - 14.5 % PLATELET 674 315 - 885 K/uL MPV 10.7 8.9 - 12.9 FL  
 NRBC 0.0 0  WBC ABSOLUTE NRBC 0.00 0.00 - 0.01 K/uL NEUTROPHILS 59 32 - 75 % LYMPHOCYTES 38 12 - 49 % MONOCYTES 1 (L) 5 - 13 % EOSINOPHILS 1 0 - 7 % BASOPHILS 1 0 - 1 % IMMATURE GRANULOCYTES 0 %  
 ABS. NEUTROPHILS 1.8 1.8 - 8.0 K/UL  
 ABS. LYMPHOCYTES 1.1 0.8 - 3.5 K/UL  
 ABS. MONOCYTES 0.0 0.0 - 1.0 K/UL  
 ABS. EOSINOPHILS 0.0 0.0 - 0.4 K/UL  
 ABS. BASOPHILS 0.0 0.0 - 0.1 K/UL  
 ABS. IMM. GRANS. 0.0 K/UL  
 DF MANUAL    
 RBC COMMENTS NORMOCYTIC, NORMOCHROMIC METABOLIC PANEL, COMPREHENSIVE Collection Time: 01/03/19  4:27 AM  
Result Value Ref Range Sodium 141 136 - 145 mmol/L Potassium 4.3 3.5 - 5.1 mmol/L Chloride 104 97 - 108 mmol/L  
 CO2 28 21 - 32 mmol/L Anion gap 9 5 - 15 mmol/L Glucose 95 65 - 100 mg/dL BUN 12 6 - 20 MG/DL Creatinine 0.28 (L) 0.70 - 1.30 MG/DL  
 BUN/Creatinine ratio 43 (H) 12 - 20 GFR est AA >60 >60 ml/min/1.73m2 GFR est non-AA >60 >60 ml/min/1.73m2 Calcium 8.8 8.5 - 10.1 MG/DL Bilirubin, total 0.2 0.2 - 1.0 MG/DL  
 ALT (SGPT) 11 (L) 12 - 78 U/L  
 AST (SGOT) 11 (L) 15 - 37 U/L Alk.  phosphatase 88 45 - 117 U/L  
 Protein, total 6.2 (L) 6.4 - 8.2 g/dL Albumin 2.4 (L) 3.5 - 5.0 g/dL Globulin 3.8 2.0 - 4.0 g/dL A-G Ratio 0.6 (L) 1.1 - 2.2 Exam: 
Visit Vitals /87 (BP 1 Location: Left arm, BP Patient Position: At rest) Pulse 73 Temp 97.9 °F (36.6 °C) Resp 18 Ht 5' 6\" (1.676 m) Wt 40 kg (88 lb 2.9 oz) SpO2 92% BMI 14.23 kg/m² Gen: Well developed, patient lying in bed, mostly non verbal but said \"no\" when I tried to look in his eyes CV: RRR Lungs: non labored breathing Abd: non distending Neuro: Alert, appears to respond with some verbal responses today CN II-XII: PERRL, EOMI, face symmetric Motor: strength The patient is able to squeeze hands bilaterally and wiggle toes bilaterally Sensory: intact to LT Assessment:  
 
Patient Active Problem List  
Diagnosis Code  Sepsis(995.91) A41.9  
 UTI (urinary tract infection) N39.0  Phenobarbital toxicity T42.3X1A  Cerebral palsy ([address] G80.9  Scoliosis M41.9  Dehydration E86.0  Leukocytosis, unspecified D72.829  
 Encephalopathy G93.40  Fever R50.9  SIRS (systemic inflammatory response syndrome) ([facility]) R65.10  Cough R05  Sepsis ([address] A41.9  Aspiration pneumonia ([address] J69.0  Hypotension I95.9  Seizure disorder ([address] G40.909  Severe sepsis (HCC) A41.9, R65.20  Acute metabolic encephalopathy W58.54  Right arm pain M79.601  Decubitus ulcers   
 
78-year-old gentleman with a history of cerebral palsy, quadriplegia, and seizure disorder who was admitted for altered mental status. He appears to be improving. Yesterday and this AM he had a few myoclonic jerks upon awakening but otherwise no seizure like activity. Discussed with family and they would prefer to continue him on the lower dose of depakote and monitor him. Plan: 1. EEG yesterday negative for seizures. Will not repeat at this time 2. CT head negative as above 3.  Continue Depakote 500/750. Discussed if the myoclonic jerks increase or seizures are seen we will increase back to 625/750 4. Continue phenobarbital 30 mg twice a day 5. Depakote level and PHB level stable 6. Antibiotics per primary Discussed with family would plan for another depakote level in 2 weeks with outpt team and to adjust VPA back to previous dose only if needed. No further neuro w/u recommended. Will sign off but please call us back with any seizure activity or questions.  
 
Signed: 
Sam Espitia MD 
1/3/2019 
9:04 AM

## 2019-01-03 NOTE — PROGRESS NOTES
Daily Progress Note: 1/3/2019 Tatianna Holloway MD 
 
Assessment/Plan:  
1) Severe sepsis/recurrent aspiration pneumonia: due to dysphagia, cerebral palsy. Patient's mom (POA), is not interested in PEG tube/feeding tubes or hospice. --IV Vanc/cefepime/flagyl   
2) Acute metabolic encephalopathy: more lethargic than baseline. Due to above.  
  
3) Seizure d/o:  
--Neuro managing and adjusting AEDs- signed off 1/1/19- re consulted 1/2/19 
--EEG stable   
4) R arm pain/contracture:  chronic. Family said that pain is worse. -- xray R arm, shoulder neg  
-- -ortho ordered brace 
--PT/OT consulted but signed off because can't help- will have them reassess per ortho's notes   
5) Cerebral palsy/functional quad: cont supportive care 
  
6) Severe pro-jesus malnutrition: 
--consult nutrition . --Family declined PEG tube   
7) Pressure ulcers: POA. Has decub ulcers, elbow and heel ulcers. -- wound care 8) Dysphagia - chronic - worsening:  Risks of feeding discussed with pts mother - insists diet be restarted. --speech rec stop thickener as it isn't helping 
  
Code: DNR - discussed with mother (POA). Not interested in Hospice 
   
 
Problem List: 
Problem List as of 1/3/2019 Date Reviewed: 12/30/2018 Codes Class Noted - Resolved * (Principal) Severe sepsis ([facility] ICD-10-CM: A41.9, R65.20 ICD-9-CM: 038.9, 995.92  12/30/2018 - Present Acute metabolic encephalopathy [id number]: G93.41 
ICD-9-CM: 348.31  12/30/2018 - Present Right arm pain ICD-10-CM: C41.347 ICD-9-CM: 729.5  12/30/2018 - Present Decubitus ulcers ICD-10-CM: L89.90 ICD-9-CM: 707.00, 707.20  12/30/2018 - Present Sepsis ([facility] ICD-10-CM: A41.9 ICD-9-CM: 038.9, 995.91  7/8/2017 - Present Aspiration pneumonia ([facility] ICD-10-CM: J69.0 ICD-9-CM: 507.0  7/8/2017 - Present Hypotension ICD-10-CM: I95.9 ICD-9-CM: 458.9  7/8/2017 - Present Seizure disorder ([facility] ICD-10-CM: G40.909 ICD-9-CM: 345.90  7/8/2017 - Present Fever ICD-10-CM: R50.9 ICD-9-CM: 780.60  8/31/2014 - Present SIRS (systemic inflammatory response syndrome) (HCC) ICD-10-CM: R65.10 ICD-9-CM: 995.90  8/31/2014 - Present Cough ICD-10-CM: R05 ICD-9-CM: 786.2  8/31/2014 - Present Sepsis(995.91) ICD-10-CM: A41.9 ICD-9-CM: 995.91  6/15/2012 - Present UTI (urinary tract infection) ICD-10-CM: N39.0 ICD-9-CM: 599.0  6/15/2012 - Present Overview Signed 6/17/2012  3:58 PM by Aguila Zuniga Escherichia coli    >100K col/mL Phenobarbital toxicity ICD-10-CM: T42.3X1A 
ICD-9-CM: 967.0, E980.1  6/15/2012 - Present Cerebral palsy ([facility] ICD-10-CM: G80.9 ICD-9-CM: 343.9  6/15/2012 - Present Scoliosis ICD-10-CM: M41.9 ICD-9-CM: 737.30  6/15/2012 - Present Dehydration ICD-10-CM: E86.0 ICD-9-CM: 276.51  6/15/2012 - Present Leukocytosis, unspecified ICD-10-CM: G08.455 ICD-9-CM: 288.60  6/15/2012 - Present Encephalopathy ICD-10-CM: G93.40 ICD-9-CM: 348.30  6/15/2012 - Present Subjective:  
  46 yo hx of cerebral palsy, functional quadriplegia, dysphagia, seizure d/o, pressure ulcers, presented w/ severe sepsis, recurrent aspiration pneumonia, AMS. The patient cannot give a history. His mother (POA), stated that the patient has been less \"responsive\" over the past 2 days, associated with high fevers. She noticed that he has been having more difficulty eating and drinking. Per [facility], the patient has a hx of multiple admissions for aspiration pneumonia. In the ED, WBC was 12.1, temp 101.2.  (Dr Ivonne Hernández). 12/31:  Pts mother reports pt is better. She wants his diet resumed. Risks of aspiration/choking/death dicussed at length and pts mother understands but wants diet and po meds resumed anyway. She does not want PEG or any type of feeding tube. Tmax 101.1. Tnow 97.3.   Cultures remain neg.  Respir Viral panel neg. 19: OT/PT signed off because they can't help. EEG was fine, neuro adjusting AEDs. Ortho wants elbow/hand splinted. SLP removed thickener as it isn't helping. Mom notes he is uncomfortable today from constipation. Wants him to have enema. 19:  Temp yesterday. I suspect he is having small aspiration events - will recheck CXR. Discussed with pts mother and she wants \"something done\" but does not want to follow NPO/PEG/Speech therapy recs. She does not want to go to PT or have home PT but wants PT to show her what to do regarding flexor contractions. Tmax 99.7. BC neg so far. Had fluid bolus last night for hypotension. 1/3/19: Afebrile last night. No seizure activity or other events noted by nursing. Chloe re consulted and adjusting meds. Remains on IV antibiotics at this time. Review of Systems:  
Review of systems not obtained due to patient factors. Objective:  
Physical Exam:  
Visit Vitals /80 (BP 1 Location: Left arm, BP Patient Position: At rest) Pulse 87 Temp 97.8 °F (36.6 °C) Resp 18 Ht 5' 6\" (1.676 m) Wt 88 lb 2.9 oz (40 kg) SpO2 90% BMI 14.23 kg/m² O2 Device: Room air Temp (24hrs), Av.8 °F (36.6 °C), Min:97.7 °F (36.5 °C), Max:97.9 °F (36.6 °C) No intake/output data recorded.  1901 -  0700 In: -  
Out:  [Urine:] General:  Alert, appears comfortable, appears stated age. Head:  Normocephalic, without obvious abnormality, atraumatic. Eyes:  Conjunctivae/corneas clear. PERRL, EOMs intact. Throat: Lips, mucosa, and tongue dry Neck: Supple, symmetrical, trachea midline, no adenopathy, thyroid: no enlargement/tenderness/nodules, no carotid bruit and no JVD. Back:   Symmetric, no curvature. ROM normal. No CVA tenderness. Lungs:   Clear to auscultation bilaterally. Chest wall:  No tenderness or deformity.   
Heart:  Regular rate and rhythm, S1, S2 normal, no murmur, click, rub or gallop. Abdomen:   Soft, non-tender. Abd distended, bowel sounds diminished. No masses,  No organomegaly. Extremities: no cyanosis or edema. No calf tenderness or cords. Skin: Chronic ulcers - right elbow, heels. Turgor fair. Neurologic: Not oriented. Does not follow commands. Contractures unchanged. Data Review:  
   
Recent Days: 
Recent Labs 01/03/19 
5822 01/02/19 
0303 01/01/19 
7440 WBC 2.9* 4.5 6.4 HGB 8.9* 8.9* 9.6* HCT 28.0* 28.9* 30.8*  152 154 Recent Labs 01/03/19 
6102 01/02/19 
0303 01/01/19 
3579  143 140  
K 4.3 4.5 4.4  
 110* 106 CO2 28 27 27 GLU 95 109* 105* BUN 12 19 6 CREA 0.28* 0.33* 0.29* CA 8.8 8.2* 8.8 MG  --   --  1.6 PHOS  --   --  3.1 ALB 2.4* 2.1* 2.4* TBILI 0.2 0.2 0.2 SGOT 11* 7* 8* ALT 11* 11* 11* No results for input(s): PH, PCO2, PO2, HCO3, FIO2 in the last 72 hours. 24 Hour Results: 
Recent Results (from the past 24 hour(s)) Man Eleazar Collection Time: 01/02/19  9:07 AM  
Result Value Ref Range Vancomycin,trough 9.6 5.0 - 10.0 ug/mL Reported dose date: [id number] Reported dose time: 2100 Reported dose: 750 MG UNITS  
CBC WITH AUTOMATED DIFF Collection Time: 01/03/19  4:27 AM  
Result Value Ref Range WBC 2.9 (L) 4.1 - 11.1 K/uL  
 RBC 2.73 (L) 4.10 - 5.70 M/uL HGB 8.9 (L) 12.1 - 17.0 g/dL HCT 28.0 (L) 36.6 - 50.3 % .6 (H) 80.0 - 99.0 FL  
 MCH 32.6 26.0 - 34.0 PG  
 MCHC 31.8 30.0 - 36.5 g/dL  
 RDW 12.5 11.5 - 14.5 % PLATELET 128 210 - 185 K/uL MPV 10.7 8.9 - 12.9 FL  
 NRBC 0.0 0  WBC ABSOLUTE NRBC 0.00 0.00 - 0.01 K/uL NEUTROPHILS 59 32 - 75 % LYMPHOCYTES 38 12 - 49 % MONOCYTES 1 (L) 5 - 13 % EOSINOPHILS 1 0 - 7 % BASOPHILS 1 0 - 1 % IMMATURE GRANULOCYTES 0 %  
 ABS. NEUTROPHILS 1.8 1.8 - 8.0 K/UL  
 ABS. LYMPHOCYTES 1.1 0.8 - 3.5 K/UL  
 ABS. MONOCYTES 0.0 0.0 - 1.0 K/UL  
 ABS. EOSINOPHILS 0.0 0.0 - 0.4 K/UL ABS. BASOPHILS 0.0 0.0 - 0.1 K/UL  
 ABS. IMM. GRANS. 0.0 K/UL  
 DF MANUAL    
 RBC COMMENTS NORMOCYTIC, NORMOCHROMIC METABOLIC PANEL, COMPREHENSIVE Collection Time: 01/03/19  4:27 AM  
Result Value Ref Range Sodium 141 136 - 145 mmol/L Potassium 4.3 3.5 - 5.1 mmol/L Chloride 104 97 - 108 mmol/L  
 CO2 28 21 - 32 mmol/L Anion gap 9 5 - 15 mmol/L Glucose 95 65 - 100 mg/dL BUN 12 6 - 20 MG/DL Creatinine 0.28 (L) 0.70 - 1.30 MG/DL  
 BUN/Creatinine ratio 43 (H) 12 - 20 GFR est AA >60 >60 ml/min/1.73m2 GFR est non-AA >60 >60 ml/min/1.73m2 Calcium 8.8 8.5 - 10.1 MG/DL Bilirubin, total 0.2 0.2 - 1.0 MG/DL  
 ALT (SGPT) 11 (L) 12 - 78 U/L  
 AST (SGOT) 11 (L) 15 - 37 U/L Alk. phosphatase 88 45 - 117 U/L Protein, total 6.2 (L) 6.4 - 8.2 g/dL Albumin 2.4 (L) 3.5 - 5.0 g/dL Globulin 3.8 2.0 - 4.0 g/dL A-G Ratio 0.6 (L) 1.1 - 2.2 Medications reviewed Current Facility-Administered Medications Medication Dose Route Frequency  vancomycin (VANCOCIN) 750 mg in 0.9% sodium chloride 250 mL IVPB  750 mg IntraVENous Q8H  
 cefepime (MAXIPIME) 2 g in 0.9% sodium chloride (MBP/ADV) 100 mL  2 g IntraVENous Q12H  divalproex DR (DEPAKOTE) tablet 250 mg  250 mg Oral DAILY WITH DINNER  
 divalproex DR (DEPAKOTE) tablet 500 mg  500 mg Oral BID  potassium chloride (KLOR-CON) packet for solution 20 mEq  20 mEq Oral DAILY  magnesium oxide (MAG-OX) tablet 400 mg  400 mg Oral DAILY  sodium chloride (NS) flush 5-10 mL  5-10 mL IntraVENous PRN  
 albuterol-ipratropium (DUO-NEB) 2.5 MG-0.5 MG/3 ML  3 mL Nebulization Q4H PRN  pantoprazole (PROTONIX) 40 mg in sodium chloride 0.9% 10 mL injection  40 mg IntraVENous DAILY  PHENobarbital (LUMINAL) injection 30 mg  30 mg IntraVENous Q12H  
 sodium chloride (NS) flush 5-10 mL  5-10 mL IntraVENous Q8H  
 sodium chloride (NS) flush 5-10 mL  5-10 mL IntraVENous PRN  
  HYDROmorphone (DILAUDID) syringe 0.2 mg  0.2 mg IntraVENous Q4H PRN  
 naloxone (NARCAN) injection 0.4 mg  0.4 mg IntraVENous PRN  
 diphenhydrAMINE (BENADRYL) injection 12.5 mg  12.5 mg IntraVENous Q4H PRN  
 ondansetron (ZOFRAN) injection 4 mg  4 mg IntraVENous Q6H PRN  
 docusate sodium (COLACE) capsule 100 mg  100 mg Oral BID  acetaminophen (TYLENOL) suppository 650 mg  650 mg Rectal Q4H PRN  
 enoxaparin (LOVENOX) injection 30 mg  30 mg SubCUTAneous Q24H  
 metroNIDAZOLE (FLAGYL) IVPB premix 500 mg  500 mg IntraVENous Q12H Care Plan discussed with: Patient/Family and Nurse Total time spent with patient: 30 minutes.  
Mildred Gomez MD

## 2019-01-03 NOTE — PROGRESS NOTES
Bedside and Verbal shift change report given to Starr Ramirez RN (oncoming nurse) by Shannon Dang RN (offgoing nurse). Report included the following information SBAR, Kardex, Intake/Output, MAR and Recent Results.

## 2019-01-03 NOTE — PROGRESS NOTES
Per chart review, neuro consulted. Medication (depakote) adjusted. No seizure activity noted . Continued IV ABX. 
 
 
4:45pm: CM spoke with Pt's mother per mother's request. Pt's mother inquired if [facility] [place] would be arranged for Pt at discharge. CM informed Pt's mother that [facility] [place] could be arranged, Pt's mother stated that Pt always receives [facility] [place] services from [facility]. CM contacted attending to notify of Pt's mother wanting HH. Verbal authorization was given per attending to order [facility] [place] for Pt at discharge. CM will continue to follow for discharge planning. MIKE Haines, [facility]

## 2019-01-03 NOTE — PROGRESS NOTES
Bedside shift change report given to Frederick Goss RN  (oncoming nurse) by Patsy Albert RN  (offgoing nurse). Report included the following information SBAR and MAR.

## 2019-01-03 NOTE — PROGRESS NOTES
Bedside shift change report given to Jessica Art (oncoming nurse) by Stephenie Pollard (offgoing nurse). Report included the following information SBAR, Intake/Output, MAR and Recent Results.

## 2019-01-03 NOTE — PROGRESS NOTES
Spiritual Care Assessment/Progress Note [address] 
 
 
NAME: Lia Reed      MRN: [id number] AGE: 47 y.o. SEX: male Christian Affiliation: Reza Posada  
Language: [place] 1/3/2019     Total Time (in minutes): 11 Spiritual Assessment begun in [facility]  MED SURG 2 through conversation with: 
  
    [x]Patient        [x] Family    [] Friend(s) Reason for Consult: Initial/Spiritual assessment, patient floor Spiritual beliefs Per Mom: (Please include comment if needed) [x] Identifies with a shila tradition:     
   [] Supported by a shila community:        
   [] Claims no spiritual orientation:       
   [] Seeking spiritual identity:            
   [] Adheres to an individual form of spirituality:       
   [] Not able to assess:                   
 
    
Identified resources for coping:  
   [] Prayer                           
   [] Music                  [] Guided Imagery [x] Family/friends                 [] Pet visits [] Devotional reading                         [] Unknown 
   [] Other:                                     
 
 
Interventions offered during this visit: (See comments for more details) Patient Interventions: Affirmation of shila, Iconic (affirming the presence of God/Higher Power), Prayer (assurance of) Family/Friend(s): Affirmation of shila, Catharsis/review of pertinent events in supportive environment, Iconic (affirming the presence of God/Higher Power), Prayer (assurance of) Plan of Care: 
 
 [] Support spiritual and/or cultural needs  
 [] Support AMD and/or advance care planning process    
 [] Support grieving process 
 [] Coordinate Rites and/or Rituals  
 [] Coordination with community clergy 
 [x] No spiritual needs identified at this time 
 [] Detailed Plan of Care below (See Comments)  [] Make referral to Music Therapy 
[] Make referral to Pet Therapy    
[] Make referral to Addiction services 
[] Make referral to [facility] [] Make referral to Spiritual Care Partner 
[] No future visits requested       
[] Follow up visits as needed Comments: Initial spiritual assessment in 5 Med Surg. Ruperto's mother, step dad and brother were in the room. Ruperto made good eye contact and even spoke a bit though I did not understand what he said. I know him and family from previous visits. They have a Christian shila in [facility] [address] and are extremely supportive of each other and [place]. Provided spiritual presence and assurance of prayer. THey have no needs at this time. Advised of  Availability. Visited by: Jenny Estrada., MS., [address] ([id number])

## 2019-01-03 NOTE — PROGRESS NOTES
Nutrition Assessment: 
 
RECOMMENDATIONS/INTERVENTION(S):  
1. Continue with pureed diet /honey thick liquids per SLP 2. Continue yogurt with all meals, Prosource in applesauce with all meals, Ensure Pudding with all meals, and Magic Cup BID. 3. Will monitor PO intakes of meals and supplements, weight. ASSESSMENT:  
1/3/19: Pt eating well. Last BM was yesterday. Pt seems to be tolerating meals. Pt has assistance to eat meals. Continue ONS and snacks for weight gain. Continue to monitor weight. Pt up 7 lbs (3.1 kg) in last 4 days. Follow aspiration precautions when feeding. Monitor plan of care and d/c timeframe. 12/31:  46 yo male admitted for Sepsis, recurrent aspiration pneumonia. RD assessment for MD consult: general nutrition management and supplements. Underweight per BMI. Mother present, states pt has been losing weight recently but is unable to weigh him at home so unsure of amount. Medical records indicate pt's weight fluctuates but is down 8.7kg in last 6 months. PMhx: CP, functional quadriplegia, seizures, dysphagia. Mother prepares all of pt's meals at home, blending cooked food to pureed consistency and liquids to honey thick. Pureed with honey thick liquids ordered this admission as well. Family is refusing PEG and hospice. Pt has off and on bouts of coughing with meals, when this occurs, mother holds off on feeding him the rest of the meal till he calms down. For breakfast she prepares oatmeal blended with Ensure Plus and banana, applesauce with protein powder and thickened juice. For lunch he has thickened vegetable soup mixed with protein powder, yogurt, pudding, applesauce. Ollie Constant is similar. At the end of the day she gives him a total of 1 Ensure Plus and 2.5 scoops of protein powder. Discussed additional ways to increase kcal intake. Discussed in house supplements she wants pt to receive with meals. Pt has very good appetite.   Labs reviewed. Meds: colace, Mgox, Kcl, D5 1/2NaCl with 20mEqkcl at 100ml/hr. Diet Order: Puree 
% Eaten:   
Patient Vitals for the past 72 hrs: 
 % Diet Eaten 01/01/19 0856 100 % 12/31/18 1804 15 % Pertinent Medications: [x] Reviewed Labs: [x] Reviewed Anthropometrics: Height: 5' 6\" (167.6 cm) Weight: 39.6 kg (87 lb 4.8 oz) IBW (%IBW):   ( ) UBW (%UBW):   (  %) BMI: Body mass index is 14.09 kg/m². This BMI is indicative of: 
 [x] Underweight    [] Normal    [] Overweight    []  Obesity    []  Extreme Obesity (BMI>40) Estimated Nutrition Needs (Based on): 7366 Kcals/day(BMR 1158 x  AF 1.3) , 44 g(44-51gm (1.2-1.4gm/kg/d)) Protein Carbohydrate: At Least 130 g/day  Fluids: 1505mL/day (1 ml/kcal) Last BM: 12/27   [x]Active     []Hyperactive  []Hypoactive       [] Absent   BS Skin:    [] Intact   [] Incision  [x] Breakdown - wounds to right elbow and sacrum     [] DTI   [] Tears/Excoriation/Abrasion  []Edema [] Other: Wt Readings from Last 30 Encounters:  
01/03/19 39.6 kg (87 lb 4.8 oz) 10/14/18 32.7 kg (72 lb)  
06/26/18 45.2 kg (99 lb 10.4 oz) 08/05/17 38.6 kg (85 lb)  
07/16/17 40.4 kg (89 lb 1.1 oz) 03/10/16 35.8 kg (78 lb 16 oz) 03/06/16 35.8 kg (78 lb 16 oz) 09/06/14 34.9 kg (77 lb) 08/31/14 31.8 kg (70 lb) 12/20/13 34.9 kg (77 lb)  
11/22/13 34.9 kg (77 lb) 08/02/12 40.8 kg (90 lb)  
06/15/12 45.4 kg (100 lb) NUTRITION DIAGNOSES:  
Problem:  Swallowing difficulty Underweight Etiology: related to motor causes inability to consume sufficient energy to maintain appropriate weight Signs/Symptoms: as evidenced by abnormal swallow eval - need for Pureed diet with honey thick liquids per SLP BMI 13 NUTRITION INTERVENTIONS: 
Meals/Snacks: General/healthful diet, Modify diet/texture/consistency/nutrients   Supplements: Commercial supplement GOAL:  
Consume > 75% all meals + supplements in next 1-3 days; gain 0.5-1lb/week Cultural, Roman Catholic, or Ethnic Dietary Needs: None EDUCATION & DISCHARGE NEEDS:  
 [] None Identified 
 [x] Identified and Education Provided/Documented- high calorie diet education provided to mother, verbalized understanding 
 [] Identified and Pt declined/was not appropriate [x] Interdisciplinary Care Plan Reviewed/Documented  
 [x] Discharge Needs:    Continue supplements added to meals, high calorie diet at home 
 [] No Nutrition Related Discharge Needs NUTRITION RISK:  
Pt Is At Nutrition Risk  [x] No Nutrition Risk Identified  [] PT SEEN FOR:  
 [x]  MD Consult: []Calorie Count []Diabetic Diet Education []Diet Education []Electrolyte Management 
   [x]General Nutrition Management and Supplements []Management of Tube Feeding []TPN Recommendations []  RN Referral:  []MST score >=2 
   []Enteral/Parenteral Nutrition PTA []Pregnant: Gestational DM or Multigestation  
              [] Pressure Ulcer 
 
[]  Low BMI      []  Length of Stay       [] Dysphagia Diet         [] Ventilator 
[]  Follow-up Previous Recommendations: 
 [] Implemented          [] Not Implemented          [x] Not Applicable Previous Goal: 
 [] Met              [] Progressing Towards Goal              [] Not Progressing Towards Goal   [x] Not Applicable Neville Stokes RD Pager [phone] Phone [phone]

## 2019-01-03 NOTE — PROGRESS NOTES
[address] [address] Pharmacy Dosing Services: Antimicrobial Stewardship Daily Doc Consult for antibiotic dosing of Vancomycin, Cefepime, Metronidazole by Dr. Mily Dent (Attending Dr. Penelope Parra) Indication:Severe sepsis/recurrent aspiration pneumonia Day of Therapy 5 of 7 (spoke with dr. Paola Cohen and he would like to continue triple therapy at this time but is okay with 7 day stop date--entered) Ht Readings from Last 1 Encounters:  
12/31/18 167.6 cm (66\") Wt Readings from Last 1 Encounters:  
01/01/19 36.6 kg (80 lb 11 oz) Vancomycin therapy: 
? Current maintenance dose: 750 (mg) every 8 hours ? Dose calculated to approximate a therapeutic trough of 15-20 mcg/mL ? SCr 0.28 adjusted to 0.84 (for calculation purposes) to account for quadriplegia, WBC WNL, afebrile Vancomycin trough:  
1/3/19: 15.1 mcg/ml on 750 mg IV q 8 hours, drawn appropriately. Will continue current dose per MD and will watch scr carefully. Stop date entered 1/2/19: 9.6 mcg/ml on 750 mg IV q 12 hours, which extrapolates to 9.03 mcg/ml (dose was thus increased to 750 mg IV q 8 hours) ? Plan for level / Adjustment in Therapy: Continue current therapy; monitor scr closely Non-Kinetic Antimicrobial Dosing Regimen: per renal protocol ? Current Regimen:  Cefepime 2 gm Q12H, KABA 500 mg every 12 hours ? Cr adjusted for function quadriplegia - eCrCl 43-58 ml/min ? Adjustment in Therapy: Continue current as crcl < 60 ml/min and patient improving ? Dose administration notes:   Doses given appropriately as scheduled Other Antimicrobial  
(not dosed by pharmacist) none Cultures 12/30 Blood x2 - NGsf (pending) 12/30 Urine - on hold 12/30 Resp panel - not detected (FINAL) Serum Creatinine Lab Results Component Value Date/Time  Creatinine 0.28 (L) 01/03/2019 04:27 AM  
 Creatinine (POC) 0.4 (L) 08/02/2012 08:18 PM  
  
  
Creatinine Clearance Estimated Creatinine Clearance: 174.6 mL/min (A) (based on SCr of 0.28 mg/dL (L)). Temp Temp: 97.9 °F (36.6 °C) WBC Lab Results Component Value Date/Time WBC 2.9 (L) 01/03/2019 04:27 AM  
 
  
H/H Lab Results Component Value Date/Time HGB 8.9 (L) 01/03/2019 04:27 AM  
 
  
Platelets Lab Results Component Value Date/Time PLATELET 298 82/70/3667 04:27 AM  
 
  
 
Mike Sarah, PharmD Contact: [phone]

## 2019-01-04 VITALS
HEART RATE: 100 BPM | DIASTOLIC BLOOD PRESSURE: 78 MMHG | TEMPERATURE: 97.3 F | SYSTOLIC BLOOD PRESSURE: 125 MMHG | HEIGHT: 66 IN | OXYGEN SATURATION: 94 % | WEIGHT: 87.3 LBS | BODY MASS INDEX: 14.03 KG/M2 | RESPIRATION RATE: 18 BRPM

## 2019-01-04 LAB
ALBUMIN SERPL-MCNC: 2.5 G/DL (ref 3.5–5)
ALBUMIN/GLOB SERPL: 0.6 {RATIO} (ref 1.1–2.2)
ALP SERPL-CCNC: 95 U/L (ref 45–117)
ALT SERPL-CCNC: 20 U/L (ref 12–78)
ANION GAP SERPL CALC-SCNC: 5 MMOL/L (ref 5–15)
AST SERPL-CCNC: 21 U/L (ref 15–37)
BASOPHILS # BLD: 0 K/UL (ref 0–0.1)
BASOPHILS NFR BLD: 1 % (ref 0–1)
BILIRUB SERPL-MCNC: 0.1 MG/DL (ref 0.2–1)
BUN SERPL-MCNC: 16 MG/DL (ref 6–20)
BUN/CREAT SERPL: 48 (ref 12–20)
CALCIUM SERPL-MCNC: 8.9 MG/DL (ref 8.5–10.1)
CHLORIDE SERPL-SCNC: 105 MMOL/L (ref 97–108)
CO2 SERPL-SCNC: 30 MMOL/L (ref 21–32)
CREAT SERPL-MCNC: 0.33 MG/DL (ref 0.7–1.3)
DIFFERENTIAL METHOD BLD: ABNORMAL
EOSINOPHIL # BLD: 0.2 K/UL (ref 0–0.4)
EOSINOPHIL NFR BLD: 6 % (ref 0–7)
ERYTHROCYTE [DISTWIDTH] IN BLOOD BY AUTOMATED COUNT: 12.8 % (ref 11.5–14.5)
GLOBULIN SER CALC-MCNC: 4.2 G/DL (ref 2–4)
GLUCOSE SERPL-MCNC: 86 MG/DL (ref 65–100)
HCT VFR BLD AUTO: 31.5 % (ref 36.6–50.3)
HGB BLD-MCNC: 9.8 G/DL (ref 12.1–17)
IMM GRANULOCYTES # BLD: 0 K/UL
IMM GRANULOCYTES NFR BLD AUTO: 0 %
LYMPHOCYTES # BLD: 1.4 K/UL (ref 0.8–3.5)
LYMPHOCYTES NFR BLD: 36 % (ref 12–49)
MCH RBC QN AUTO: 32.2 PG (ref 26–34)
MCHC RBC AUTO-ENTMCNC: 31.1 G/DL (ref 30–36.5)
MCV RBC AUTO: 103.6 FL (ref 80–99)
MONOCYTES # BLD: 0.3 K/UL (ref 0–1)
MONOCYTES NFR BLD: 9 % (ref 5–13)
MYELOCYTES NFR BLD MANUAL: 1 %
NEUTS SEG # BLD: 1.8 K/UL (ref 1.8–8)
NEUTS SEG NFR BLD: 47 % (ref 32–75)
NRBC # BLD: 0 K/UL (ref 0–0.01)
NRBC BLD-RTO: 0 PER 100 WBC
PLATELET # BLD AUTO: 202 K/UL (ref 150–400)
PMV BLD AUTO: 10.4 FL (ref 8.9–12.9)
POTASSIUM SERPL-SCNC: 4.4 MMOL/L (ref 3.5–5.1)
PROT SERPL-MCNC: 6.7 G/DL (ref 6.4–8.2)
RBC # BLD AUTO: 3.04 M/UL (ref 4.1–5.7)
RBC MORPH BLD: ABNORMAL
SODIUM SERPL-SCNC: 140 MMOL/L (ref 136–145)
WBC # BLD AUTO: 3.8 K/UL (ref 4.1–11.1)

## 2019-01-04 PROCEDURE — C9113 INJ PANTOPRAZOLE SODIUM, VIA: HCPCS | Performed by: INTERNAL MEDICINE

## 2019-01-04 PROCEDURE — 94760 N-INVAS EAR/PLS OXIMETRY 1: CPT

## 2019-01-04 PROCEDURE — 74011250637 HC RX REV CODE- 250/637: Performed by: INTERNAL MEDICINE

## 2019-01-04 PROCEDURE — 85025 COMPLETE CBC W/AUTO DIFF WBC: CPT

## 2019-01-04 PROCEDURE — 74011250636 HC RX REV CODE- 250/636: Performed by: INTERNAL MEDICINE

## 2019-01-04 PROCEDURE — 74011250637 HC RX REV CODE- 250/637: Performed by: FAMILY MEDICINE

## 2019-01-04 PROCEDURE — 36415 COLL VENOUS BLD VENIPUNCTURE: CPT

## 2019-01-04 PROCEDURE — 80053 COMPREHEN METABOLIC PANEL: CPT

## 2019-01-04 RX ORDER — AMOXICILLIN AND CLAVULANATE POTASSIUM 875; 125 MG/1; MG/1
1 TABLET, FILM COATED ORAL 2 TIMES DAILY WITH MEALS
Qty: 10 TAB | Refills: 0 | Status: SHIPPED | OUTPATIENT
Start: 2019-01-04 | End: 2020-02-24

## 2019-01-04 RX ADMIN — Medication 10 ML: at 06:00

## 2019-01-04 RX ADMIN — DOCUSATE SODIUM 100 MG: 100 CAPSULE, LIQUID FILLED ORAL at 09:00

## 2019-01-04 RX ADMIN — PANTOPRAZOLE SODIUM 40 MG: 40 INJECTION, POWDER, FOR SOLUTION INTRAVENOUS at 09:00

## 2019-01-04 RX ADMIN — PHENOBARBITAL SODIUM 30 MG: 65 INJECTION INTRAMUSCULAR; INTRAVENOUS at 09:00

## 2019-01-04 RX ADMIN — Medication 400 MG: at 09:00

## 2019-01-04 RX ADMIN — POTASSIUM CHLORIDE 20 MEQ: 1.5 POWDER, FOR SOLUTION ORAL at 09:00

## 2019-01-04 RX ADMIN — DIVALPROEX SODIUM 500 MG: 250 TABLET, DELAYED RELEASE ORAL at 09:00

## 2019-01-04 RX ADMIN — AMOXICILLIN AND CLAVULANATE POTASSIUM 1 TABLET: 875; 125 TABLET, FILM COATED ORAL at 09:00

## 2019-01-04 NOTE — PROGRESS NOTES
[facility] (via allscripts) per Pt's mother's request.  Pt was accepted. Pt is scheduled to discharge today. CM inquired about arranging transportation for Pt. Pt's mother stated that she arranges transportation. Care Management Interventions PCP Verified by CM: Yes(Dr. Seth Zavala nurse navigator) Transition of Care Consult (CM Consult): Discharge Planning Discharge Durable Medical Equipment: No(Has a wheelchair and hospital at both mother's and brother's homes) Physical Therapy Consult: Yes Occupational Therapy Consult: Yes Speech Therapy Consult: Yes Current Support Network: (Lives with brother and/or mother) Confirm Follow Up Transport: Family Plan discussed with Pt/Family/Caregiver: Yes Discharge Location Discharge Placement: (Return home with family) Emilee 46, BS, [facility]

## 2019-01-04 NOTE — PROGRESS NOTES
Bedside shift change report given to Neo Workman RN (oncoming nurse) by Yesy Roper RN (offgoing nurse). Report included the following information SBAR, Kardex, Intake/Output, MAR and Recent Results.

## 2019-01-04 NOTE — DISCHARGE INSTRUCTIONS
Patient Discharge Instructions    Waqas Walton / [id number] : 1965    Admitted 2018 Discharged: 2019 7:19 AM     ACUTE DIAGNOSES:  Severe sepsis [facility])    CHRONIC MEDICAL DIAGNOSES:  Problem List as of 2019 Date Reviewed: 2018          Codes Class Noted - Resolved    * (Principal) Severe sepsis ([facility] ICD-10-CM: A41.9, R65.20  ICD-9-CM: 038.9, 995.92  2018 - Present        Acute metabolic encephalopathy OGR-64-SD: G93.41  ICD-9-CM: 348.31  2018 - Present        Right arm pain ICD-10-CM: M79.601  ICD-9-CM: 729.5  2018 - Present        Decubitus ulcers ICD-10-CM: L89.90  ICD-9-CM: 707.00, 707.20  2018 - Present        Sepsis ([facility] ICD-10-CM: A41.9  ICD-9-CM: 038.9, 995.91  2017 - Present        Aspiration pneumonia ([facility] ICD-10-CM: J69.0  ICD-9-CM: 507.0  2017 - Present        Hypotension ICD-10-CM: I95.9  ICD-9-CM: 458.9  2017 - Present        Seizure disorder ([facility] ICD-10-CM: G40.909  ICD-9-CM: 345.90  2017 - Present        Fever ICD-10-CM: R50.9  ICD-9-CM: 780.60  2014 - Present        SIRS (systemic inflammatory response syndrome) ([facility] ICD-10-CM: R65.10  ICD-9-CM: 995.90  2014 - Present        Cough ICD-10-CM: R05  ICD-9-CM: 786.2  2014 - Present        Sepsis(995.91) ICD-10-CM: A41.9  ICD-9-CM: 995.91  6/15/2012 - Present        UTI (urinary tract infection) ICD-10-CM: N39.0  ICD-9-CM: 599.0  6/15/2012 - Present    Overview Signed 2012  3:58 PM by Cayden Pitcher     Escherichia coli    >100K col/mL             Phenobarbital toxicity ICD-10-CM: T42.3X1A  ICD-9-CM: 967.0, E980.1  6/15/2012 - Present        Cerebral palsy ([facility] ICD-10-CM: G80.9  ICD-9-CM: 343.9  6/15/2012 - Present        Scoliosis ICD-10-CM: M41.9  ICD-9-CM: 737.30  6/15/2012 - Present        Dehydration ICD-10-CM: E86.0  ICD-9-CM: 276.51  6/15/2012 - Present        Leukocytosis, unspecified ICD-10-CM: U90.133  ICD-9-CM: 288.60  6/15/2012 - Present Encephalopathy ICD-10-CM: G93.40  ICD-9-CM: 348.30  6/15/2012 - Present              DISCHARGE MEDICATIONS:         · It is important that you take the medication exactly as they are prescribed. · Keep your medication in the bottles provided by the pharmacist and keep a list of the medication names, dosages, and times to be taken in your wallet. · Do not take other medications without consulting your doctor. DIET:  Dysphagia diet-pureed and Nectar thick liquids    ACTIVITY: Activity as tolerated    ADDITIONAL INFORMATION: If you experience any of the following symptoms then please call your primary care physician or return to the emergency room if you cannot get hold of your doctor: Fever, chills, nausea, vomiting, diarrhea, change in mentation, falling, bleeding, shortness of breath. FOLLOW UP CARE:  Dr. Christopher Deleon MD  you are to call and set up an appointment to see them with in 1 week. Follow-up with specialists at directed by them      Information obtained by :  I understand that if any problems occur once I am at home I am to contact my physician. I understand and acknowledge receipt of the instructions indicated above.                                                                                                                                            Physician's or R.N.'s Signature                                                                  Date/Time                                                                                                                                              Patient or Representative Signature                                                          Date/Time

## 2019-01-04 NOTE — PROGRESS NOTES
Bedside and Verbal shift change report given to Jesús Gonzalez RN (oncoming nurse) by Craig Andrews RN (offgoing nurse).  Report included the following information SBAR, Kardex, Intake/Output, MAR and Recent Results.

## 2019-01-04 NOTE — FACE TO FACE
[address] Face to Face Encounter Patients Name: Pat Vo    YOB: 1965 Primary Diagnosis: Severe sepsis ([address] Date of Face to Face:   1/4/2019 Face to Face Encounter findings are related to primary reason for home care:   yes. 1. I certify that the patient needs intermittent care as follows: skilled nursing care:  skilled observation/assessment, patient education, complex care plan management, wound care and brower catheter care 
physical therapy: stretching/ROM and pt/caregiver education 
occupational therapy:  ROM 
speech therapy:  oral motor development, swallowing education and pt/caregiver education 2. I certify that this patient is homebound, that is: 1) patient requires the use of a wheelchair device, special transportation, or assistance of another to leave the home; or 2) patient's condition makes leaving the home medically contraindicated; and 3) patient has a normal inability to leave the home and leaving the home requires considerable and taxing effort. Patient may leave the home for infrequent and short duration for medical reasons, and occasional absences for non-medical reasons. Homebound status is due to the following functional limitations: Patient with strength deficits limiting the performance of all ADL's without caregiver assistance or the use of an assistive device. Mentally challenged, Bed/Chair bound, contractures 3. I certify that this patient is under my care and that I, or a nurse practitioner or  [id number], or clinical nurse specialist, or certified nurse midwife, working with me, had a Face-to-Face Encounter that meets the physician Face-to-Face Encounter requirements. The following are the clinical findings from the [address] [place] [address] Street encounter that support the need for skilled services and is a summary of the encounter:  
 
See attached progess note and discharge summary Romeo Chowdhury MD 
1/4/2019 THE FOLLOWING TO BE COMPLETED BY THE COMMUNITY PHYSICIAN: 
 
I concur with the findings described above from the F2F encounter that this patient is homebound and in need of a skilled service. Certifying Physician: _____________________________________ Printed Certifying Physician Name: _____________________________________ Date: _________________

## 2019-01-04 NOTE — PROGRESS NOTES
D/C summary reviewed with pt's mom. HH to see pt when he gets home. Family given opportunity for questions. Pt stable at time of d/c home.

## 2019-01-04 NOTE — DISCHARGE SUMMARY
Physician Discharge Summary     Patient ID:    Dunia Ewing  [id number]  47 y.o.  1965  Arina Agarwal MD    Admit date: 12/30/2018    Discharge date and time: 1/4/2019    Admission Diagnoses: Severe sepsis [facility])    Chronic Diagnoses:    Problem List as of 1/4/2019 Date Reviewed: 12/30/2018          Codes Class Noted - Resolved    * (Principal) Severe sepsis ([facility] ICD-10-CM: A41.9, R65.20  ICD-9-CM: 038.9, 995.92  12/30/2018 - Present        Acute metabolic encephalopathy NEJ-03-FE: G93.41  ICD-9-CM: 348.31  12/30/2018 - Present        Right arm pain ICD-10-CM: M79.601  ICD-9-CM: 729.5  12/30/2018 - Present        Decubitus ulcers ICD-10-CM: L89.90  ICD-9-CM: 707.00, 707.20  12/30/2018 - Present        Sepsis ([facility] ICD-10-CM: A41.9  ICD-9-CM: 038.9, 995.91  7/8/2017 - Present        Aspiration pneumonia ([facility] ICD-10-CM: J69.0  ICD-9-CM: 507.0  7/8/2017 - Present        Hypotension ICD-10-CM: I95.9  ICD-9-CM: 458.9  7/8/2017 - Present        Seizure disorder ([facility] ICD-10-CM: G40.909  ICD-9-CM: 345.90  7/8/2017 - Present        Fever ICD-10-CM: R50.9  ICD-9-CM: 780.60  8/31/2014 - Present        SIRS (systemic inflammatory response syndrome) ([facility] ICD-10-CM: R65.10  ICD-9-CM: 995.90  8/31/2014 - Present        Cough ICD-10-CM: R05  ICD-9-CM: 786.2  8/31/2014 - Present        Sepsis(995.91) ICD-10-CM: A41.9  ICD-9-CM: 995.91  6/15/2012 - Present        UTI (urinary tract infection) ICD-10-CM: N39.0  ICD-9-CM: 599.0  6/15/2012 - Present    Overview Signed 6/17/2012  3:58 PM by Cesario Carpio     Escherichia coli    >100K col/mL             Phenobarbital toxicity ICD-10-CM: T42.3X1A  ICD-9-CM: 967.0, E980.1  6/15/2012 - Present        Cerebral palsy ([facility] ICD-10-CM: G80.9  ICD-9-CM: 343.9  6/15/2012 - Present        Scoliosis ICD-10-CM: M41.9  ICD-9-CM: 737.30  6/15/2012 - Present        Dehydration ICD-10-CM: E86.0  ICD-9-CM: 276.51  6/15/2012 - Present        Leukocytosis, unspecified ICD-10-CM: D72.829  ICD-9-CM: 288.60  6/15/2012 - Present        Encephalopathy ICD-10-CM: G93.40  ICD-9-CM: 348.30  6/15/2012 - Present              Discharge Medications:   Current Discharge Medication List      START taking these medications    Details   amoxicillin-clavulanate (AUGMENTIN) 875-125 mg per tablet Take 1 Tab by mouth two (2) times daily (with meals). Qty: 10 Tab, Refills: 0         CONTINUE these medications which have NOT CHANGED    Details   !! divalproex DR (DEPAKOTE) 125 mg tablet Take 250 mg by mouth daily (with dinner). Takes with 500mg at dinner. aspirin delayed-release 81 mg tablet Take 81 mg by mouth daily (with dinner). ascorbic acid, vitamin C, powd Take 1,000 mg by mouth daily. Mix with liquid prior to administration. omeprazole (PRILOSEC) 20 mg capsule Take 20 mg by mouth Before breakfast and dinner. PHENobarbital (LUMINAL) 30 mg tablet Take 30 mg by mouth two (2) times a day. acetaminophen (TYLENOL) 500 mg tablet Take 1,000 mg by mouth every six (6) hours as needed for Pain. DOCUSATE CALCIUM (STOOL SOFTENER PO) Take 1 tablet by mouth two (2) times daily as needed. !! divalproex DR (DEPAKOTE) 500 mg tablet Take 1 Tab by mouth two (2) times a day. Qty: 60 Tab, Refills: 0    Comments: Needs a follow up for additional refills  Associated Diagnoses: Epilepsy ([address]       ! ! - Potential duplicate medications found. Please discuss with provider. Follow up Care:    1. Alejandra Myers MD with in 1 weeks  2. Neuro and Ortho specialists as directed. Diet:  Dysphagia diet-pureed and Nectar thick liquids    Disposition:  Home with Home Health.     Advanced Directive:    Discharge Exam:  [See today's progress note.]    CONSULTATIONS: Neurology and Orthopedic Surgery    Significant Diagnostic Studies:   Recent Labs     01/04/19 0446 01/03/19 0427   WBC 3.8* 2.9*   HGB 9.8* 8.9*   HCT 31.5* 28.0*    162     Recent Labs     01/04/19  0446 01/03/19  0427 01/02/19  0303    141 143   K 4.4 4.3 4.5    104 110*   CO2 30 28 27   BUN 16 12 19   CREA 0.33* 0.28* 0.33*   GLU 86 95 109*   CA 8.9 8.8 8.2*     Recent Labs     01/04/19  0446 01/03/19  0427 01/02/19  0303   SGOT 21 11* 7*   ALT 20 11* 11*   AP 95 88 88   TBILI 0.1* 0.2 0.2   TP 6.7 6.2* 6.0*   ALB 2.5* 2.4* 2.1*   GLOB 4.2* 3.8 3.9       Lab Results   Component Value Date/Time    Glucose (POC) 91 08/02/2012 08:18 PM     Lab Results   Component Value Date/Time    TSH 0.51 06/16/2012 04:45 AM       HOSPITAL COURSE & TREATMENT RENDERED:   1) Severe sepsis/recurrent aspiration pneumonia: due to dysphagia, cerebral palsy.  Patient's mom (POA), is not interested in PEG tube/feeding tubes or hospice.       --IV Vanc/cefepime/flagyl completed. Now on Augmentin.     2) Acute metabolic encephalopathy: more lethargic than baseline.  Due to above.      3) Seizure d/o:   --Neuro managing and adjusting AEDs- signed off 1/1/19- re consulted 1/2/19  --EEG stable     4) R arm pain/contracture:  chronic.  Family said that pain is worse.    -- xray R arm, shoulder neg   -- ortho ordered brace  -- PT/OT consulted but signed off because can't help- will have them reassess per ortho's notes  --Will order outpt therapy     5) Cerebral palsy/functional quad: cont supportive care     6) Severe pro-jesus malnutrition:  --consult nutrition . --Family declined PEG tube  --Aspiration precautions and thicket     7) Pressure ulcers: POA.  Has decub ulcers, elbow and heel ulcers.    -- wound care     8) Dysphagia - chronic - worsening:  Risks of feeding discussed with pts mother - insists diet be restarted.     --Thicket and pureed diet     Code: DNR - discussed with mother (POA).  Not interested in Hospice           Subjective:     49 yo hx of cerebral palsy, functional quadriplegia, dysphagia, seizure d/o, pressure ulcers, presented w/ severe sepsis, recurrent aspiration pneumonia, AMS.  The patient cannot give a history.  His mother (POA), stated that the patient has been less \"responsive\" over the past 2 days, associated with high fevers.  She noticed that he has been having more difficulty eating and drinking. [facility] , the patient has a hx of multiple admissions for aspiration pneumonia.  In the ED, WBC was 12.1, temp 101.2.  (Dr Escudero).     12/31:  Pts mother reports pt is better. She wants his diet resumed. Risks of aspiration/choking/death dicussed at length and pts mother understands but wants diet and po meds resumed anyway. She does not want PEG or any type of feeding tube. Tmax 101.1. Tnow 97.3. Cultures remain neg. Respir Viral panel neg.      1/1/19: OT/PT signed off because they can't help. EEG was fine, neuro adjusting AEDs. Ortho wants elbow/hand splinted. SLP removed thickener as it isn't helping. Mom notes he is uncomfortable today from constipation. Wants him to have enema.       1/2/19:  Temp yesterday. I suspect he is having small aspiration events - will recheck CXR. Discussed with pts mother and she wants \"something done\" but does not want to follow NPO/PEG/Speech therapy recs. She does not want to go to PT or have home PT but wants PT to show her what to do regarding flexor contractions. Tmax 99.7. BC neg so far. Had fluid bolus last night for hypotension.      1/3/19: Afebrile last night. No seizure activity or other events noted by nursing. Chloe re consulted and adjusting meds. Remains on IV antibiotics at this time.      1/4/19: Unchanged. Started on po Augmentin yesterday and remains afebrile. Ready for discharge. Will go with HH/PT/OT. Discussed with his mother.    Review of Systems:   Review of systems not obtained due to patient factors.     Objective:   Physical Exam:   Visit Vitals  /66 (BP 1 Location: Left arm, BP Patient Position: At rest)   Pulse 86   Temp 98.1 °F (36.7 °C)   Resp 18   Ht 5' 6\" (1.676 m)   Wt 87 lb 4.8 oz (39.6 kg)   SpO2 97%   BMI 14.09 kg/m²      O2 Device: Room air     Temp (24hrs), Av °F (36.7 °C), Min:97.5 °F (36.4 °C), Max:98.4 °F (36.9 °C)    No intake/output data recorded.  1901 -  0700  In: 360 [P.O.:360]  Out: 2226 [Urine:2225]  General:  Alert, appears comfortable, appears stated age. Head:  Normocephalic, without obvious abnormality, atraumatic. Eyes:  Conjunctivae/corneas clear. PERRL, EOMs intact. Throat: Lips, mucosa, and tongue dry   Neck: Supple, symmetrical, trachea midline, no adenopathy, thyroid: no enlargement/tenderness/nodules, no carotid bruit and no JVD. Back:   Symmetric, no curvature. ROM normal. No CVA tenderness. Lungs:   Clear to auscultation bilaterally. Chest wall:  No tenderness or deformity. Heart:  Regular rate and rhythm, S1, S2 normal, no murmur, click, rub or gallop. Abdomen:   Soft, non-tender. Abd distended, bowel sounds diminished. No masses,  No organomegaly. Extremities: no cyanosis or edema. No calf tenderness or cords. Skin: Chronic ulcers - right elbow, heels. Turgor fair. Neurologic: Not oriented. Does not follow commands. Contractures unchanged.        Data Review:     IMPRESSION: CT Head  1. No evidence of acute intracranial abnormality. 2. Unchanged generalized parenchymal volume loss with marked atrophy of the  brainstem and cerebellum. Unchanged chronic white matter disease. IMPRESSION:CXR 19  1. No pneumonia    IMPRESSION: No acute abnormality. RIGHT FOREARM    FINDINGS: Two views of the right humerus demonstrate no fracture or other acute  osseous, articular or soft tissue abnormality.     IMPRESSION  IMPRESSION: No acute abnormality.       Signed:  Re Varela MD  2019  7:20 AM

## 2019-01-04 NOTE — PROGRESS NOTES
PCP DAVEY appt scheduled with Dr. Aster Vásquez on 1/9/2019 at 9:00am. Appt added to S. Franco Urias CM Specialist 
 
PCP NARCISO JEWELL appt scheduled with [facility] to see PT in 24-48 hours after discharge at 9:00am. Appt added to [address] [facility] Specialist

## 2019-01-04 NOTE — PROGRESS NOTES
Daily Progress Note: 1/4/2019 Adis Burns MD 
 
Assessment/Plan:  
1) Severe sepsis/recurrent aspiration pneumonia: due to dysphagia, cerebral palsy. Patient's mom (POA), is not interested in PEG tube/feeding tubes or hospice. --IV Vanc/cefepime/flagyl completed. Now on Augmentin. 
  
2) Acute metabolic encephalopathy: more lethargic than baseline. Due to above.  
  
3) Seizure d/o:  
--Neuro managing and adjusting AEDs- signed off 1/1/19- re consulted 1/2/19 
--EEG stable   
4) R arm pain/contracture:  chronic. Family said that pain is worse. -- xray R arm, shoulder neg  
-- ortho ordered brace -- PT/OT consulted but signed off because can't help- will have them reassess per ortho's notes --Will order outpt therapy   
5) Cerebral palsy/functional quad: cont supportive care 
  
6) Severe pro-jesus malnutrition: 
--consult nutrition . --Family declined PEG tube --Aspiration precautions and thicket 
  
7) Pressure ulcers: POA. Has decub ulcers, elbow and heel ulcers. -- wound care 8) Dysphagia - chronic - worsening:  Risks of feeding discussed with pts mother - insists diet be restarted. --Thicket and pureed diet 
  
Code: DNR - discussed with mother (POA). Not interested in Hospice 
   
 
Problem List: 
Problem List as of 1/4/2019 Date Reviewed: 12/30/2018 Codes Class Noted - Resolved * (Principal) Severe sepsis ([facility] ICD-10-CM: A41.9, R65.20 ICD-9-CM: 038.9, 995.92  12/30/2018 - Present Acute metabolic encephalopathy JPQ-03-CN: G93.41 
ICD-9-CM: 348.31  12/30/2018 - Present Right arm pain ICD-10-CM: A96.421 ICD-9-CM: 729.5  12/30/2018 - Present Decubitus ulcers ICD-10-CM: L89.90 ICD-9-CM: 707.00, 707.20  12/30/2018 - Present Sepsis ([facility] ICD-10-CM: A41.9 ICD-9-CM: 038.9, 995.91  7/8/2017 - Present Aspiration pneumonia ([facility] ICD-10-CM: J69.0 ICD-9-CM: 507.0  7/8/2017 - Present Hypotension ICD-10-CM: I95.9 ICD-9-CM: 458.9  7/8/2017 - Present Seizure disorder ([facility] ICD-10-CM: G40.909 ICD-9-CM: 345.90  7/8/2017 - Present Fever ICD-10-CM: R50.9 ICD-9-CM: 780.60  8/31/2014 - Present SIRS (systemic inflammatory response syndrome) ([facility]) ICD-10-CM: R65.10 ICD-9-CM: 995.90  8/31/2014 - Present Cough ICD-10-CM: R05 ICD-9-CM: 786.2  8/31/2014 - Present Sepsis(995.91) ICD-10-CM: A41.9 ICD-9-CM: 995.91  6/15/2012 - Present UTI (urinary tract infection) ICD-10-CM: N39.0 ICD-9-CM: 599.0  6/15/2012 - Present Overview Signed 6/17/2012  3:58 PM by Maria Eugenia Viramontes Escherichia coli    >100K col/mL Phenobarbital toxicity ICD-10-CM: T42.3X1A 
ICD-9-CM: 967.0, E980.1  6/15/2012 - Present Cerebral palsy ([facility] ICD-10-CM: G80.9 ICD-9-CM: 343.9  6/15/2012 - Present Scoliosis ICD-10-CM: M41.9 ICD-9-CM: 737.30  6/15/2012 - Present Dehydration ICD-10-CM: E86.0 ICD-9-CM: 276.51  6/15/2012 - Present Leukocytosis, unspecified ICD-10-CM: I96.293 ICD-9-CM: 288.60  6/15/2012 - Present Encephalopathy ICD-10-CM: G93.40 ICD-9-CM: 348.30  6/15/2012 - Present Subjective:  
  46 yo hx of cerebral palsy, functional quadriplegia, dysphagia, seizure d/o, pressure ulcers, presented w/ severe sepsis, recurrent aspiration pneumonia, AMS. The patient cannot give a history. His mother (POA), stated that the patient has been less \"responsive\" over the past 2 days, associated with high fevers. She noticed that he has been having more difficulty eating and drinking. Per [facility], the patient has a hx of multiple admissions for aspiration pneumonia. In the ED, WBC was 12.1, temp 101.2.  (Dr Keith Minor). 12/31:  Pts mother reports pt is better. She wants his diet resumed. Risks of aspiration/choking/death dicussed at length and pts mother understands but wants diet and po meds resumed anyway.   She does not want PEG or any type of feeding tube. Tmax 101.1. Tnow 97.3. Cultures remain neg. Respir Viral panel neg. 19: OT/PT signed off because they can't help. EEG was fine, neuro adjusting AEDs. Ortho wants elbow/hand splinted. SLP removed thickener as it isn't helping. Mom notes he is uncomfortable today from constipation. Wants him to have enema. 19:  Temp yesterday. I suspect he is having small aspiration events - will recheck CXR. Discussed with pts mother and she wants \"something done\" but does not want to follow NPO/PEG/Speech therapy recs. She does not want to go to PT or have home PT but wants PT to show her what to do regarding flexor contractions. Tmax 99.7. BC neg so far. Had fluid bolus last night for hypotension. 1/3/19: Afebrile last night. No seizure activity or other events noted by nursing. Chloe re consulted and adjusting meds. Remains on IV antibiotics at this time. 19: Unchanged. Started on po Augmentin yesterday and remains afebrile. Ready for discharge. Will go with HH/PT/OT. Discussed with his mother. Review of Systems:  
Review of systems not obtained due to patient factors. Objective:  
Physical Exam:  
Visit Vitals /66 (BP 1 Location: Left arm, BP Patient Position: At rest) Pulse 86 Temp 98.1 °F (36.7 °C) Resp 18 Ht 5' 6\" (1.676 m) Wt 87 lb 4.8 oz (39.6 kg) SpO2 97% BMI 14.09 kg/m² O2 Device: Room air Temp (24hrs), Av °F (36.7 °C), Min:97.5 °F (36.4 °C), Max:98.4 °F (36.9 °C) No intake/output data recorded.  1901 -  0700 In: 360 [P.O.:360] Out: 2226 [Urine:2225] General:  Alert, appears comfortable, appears stated age. Head:  Normocephalic, without obvious abnormality, atraumatic. Eyes:  Conjunctivae/corneas clear. PERRL, EOMs intact. Throat: Lips, mucosa, and tongue dry Neck: Supple, symmetrical, trachea midline, no adenopathy, thyroid: no enlargement/tenderness/nodules, no carotid bruit and no JVD. Back:   Symmetric, no curvature. ROM normal. No CVA tenderness. Lungs:   Clear to auscultation bilaterally. Chest wall:  No tenderness or deformity. Heart:  Regular rate and rhythm, S1, S2 normal, no murmur, click, rub or gallop. Abdomen:   Soft, non-tender. Abd distended, bowel sounds diminished. No masses,  No organomegaly. Extremities: no cyanosis or edema. No calf tenderness or cords. Skin: Chronic ulcers - right elbow, heels. Turgor fair. Neurologic: Not oriented. Does not follow commands. Contractures unchanged. Data Review:  
   
Recent Days: 
Recent Labs 01/04/19 
5719 01/03/19 
4149 01/02/19 
0303 WBC 3.8* 2.9* 4.5 HGB 9.8* 8.9* 8.9* HCT 31.5* 28.0* 28.9*  
 162 152 Recent Labs 01/04/19 
8625 01/03/19 
9866 01/02/19 
0303  141 143  
K 4.4 4.3 4.5  
 104 110* CO2 30 28 27 GLU 86 95 109* BUN 16 12 19 CREA 0.33* 0.28* 0.33* CA 8.9 8.8 8.2* ALB 2.5* 2.4* 2.1* TBILI 0.1* 0.2 0.2 SGOT 21 11* 7* ALT 20 11* 11* No results for input(s): PH, PCO2, PO2, HCO3, FIO2 in the last 72 hours. 24 Hour Results: 
Recent Results (from the past 24 hour(s)) CBC WITH AUTOMATED DIFF Collection Time: 01/04/19  4:46 AM  
Result Value Ref Range WBC 3.8 (L) 4.1 - 11.1 K/uL  
 RBC 3.04 (L) 4.10 - 5.70 M/uL HGB 9.8 (L) 12.1 - 17.0 g/dL HCT 31.5 (L) 36.6 - 50.3 % .6 (H) 80.0 - 99.0 FL  
 MCH 32.2 26.0 - 34.0 PG  
 MCHC 31.1 30.0 - 36.5 g/dL  
 RDW 12.8 11.5 - 14.5 % PLATELET 527 538 - 723 K/uL MPV 10.4 8.9 - 12.9 FL  
 NRBC 0.0 0  WBC ABSOLUTE NRBC 0.00 0.00 - 0.01 K/uL NEUTROPHILS 47 32 - 75 % LYMPHOCYTES 36 12 - 49 % MONOCYTES 9 5 - 13 % EOSINOPHILS 6 0 - 7 % BASOPHILS 1 0 - 1 % MYELOCYTES 1 (H) 0 % IMMATURE GRANULOCYTES 0 %  
 ABS. NEUTROPHILS 1.8 1.8 - 8.0 K/UL  
 ABS. LYMPHOCYTES 1.4 0.8 - 3.5 K/UL ABS. MONOCYTES 0.3 0.0 - 1.0 K/UL  
 ABS. EOSINOPHILS 0.2 0.0 - 0.4 K/UL  
 ABS. BASOPHILS 0.0 0.0 - 0.1 K/UL  
 ABS. IMM. GRANS. 0.0 K/UL  
 DF MANUAL    
 RBC COMMENTS NORMOCYTIC, NORMOCHROMIC METABOLIC PANEL, COMPREHENSIVE Collection Time: 01/04/19  4:46 AM  
Result Value Ref Range Sodium 140 136 - 145 mmol/L Potassium 4.4 3.5 - 5.1 mmol/L Chloride 105 97 - 108 mmol/L  
 CO2 30 21 - 32 mmol/L Anion gap 5 5 - 15 mmol/L Glucose 86 65 - 100 mg/dL BUN 16 6 - 20 MG/DL Creatinine 0.33 (L) 0.70 - 1.30 MG/DL  
 BUN/Creatinine ratio 48 (H) 12 - 20 GFR est AA >60 >60 ml/min/1.73m2 GFR est non-AA >60 >60 ml/min/1.73m2 Calcium 8.9 8.5 - 10.1 MG/DL Bilirubin, total 0.1 (L) 0.2 - 1.0 MG/DL  
 ALT (SGPT) 20 12 - 78 U/L  
 AST (SGOT) 21 15 - 37 U/L Alk. phosphatase 95 45 - 117 U/L Protein, total 6.7 6.4 - 8.2 g/dL Albumin 2.5 (L) 3.5 - 5.0 g/dL Globulin 4.2 (H) 2.0 - 4.0 g/dL A-G Ratio 0.6 (L) 1.1 - 2.2 Medications reviewed Current Facility-Administered Medications Medication Dose Route Frequency  amoxicillin-clavulanate (AUGMENTIN) 875-125 mg per tablet 1 Tab  1 Tab Oral BID WITH MEALS  divalproex DR (DEPAKOTE) tablet 250 mg  250 mg Oral DAILY WITH DINNER  
 divalproex DR (DEPAKOTE) tablet 500 mg  500 mg Oral BID  potassium chloride (KLOR-CON) packet for solution 20 mEq  20 mEq Oral DAILY  magnesium oxide (MAG-OX) tablet 400 mg  400 mg Oral DAILY  sodium chloride (NS) flush 5-10 mL  5-10 mL IntraVENous PRN  
 albuterol-ipratropium (DUO-NEB) 2.5 MG-0.5 MG/3 ML  3 mL Nebulization Q4H PRN  pantoprazole (PROTONIX) 40 mg in sodium chloride 0.9% 10 mL injection  40 mg IntraVENous DAILY  PHENobarbital (LUMINAL) injection 30 mg  30 mg IntraVENous Q12H  
 sodium chloride (NS) flush 5-10 mL  5-10 mL IntraVENous Q8H  
 sodium chloride (NS) flush 5-10 mL  5-10 mL IntraVENous PRN  
  HYDROmorphone (DILAUDID) syringe 0.2 mg  0.2 mg IntraVENous Q4H PRN  
 naloxone (NARCAN) injection 0.4 mg  0.4 mg IntraVENous PRN  
 diphenhydrAMINE (BENADRYL) injection 12.5 mg  12.5 mg IntraVENous Q4H PRN  
 ondansetron (ZOFRAN) injection 4 mg  4 mg IntraVENous Q6H PRN  
 docusate sodium (COLACE) capsule 100 mg  100 mg Oral BID  acetaminophen (TYLENOL) suppository 650 mg  650 mg Rectal Q4H PRN  
 enoxaparin (LOVENOX) injection 30 mg  30 mg SubCUTAneous Q24H Care Plan discussed with: Patient/Family and Nurse Total time spent with patient: 30 minutes.  
Hugo Dudley MD

## 2019-11-22 ENCOUNTER — OFFICE VISIT (OUTPATIENT)
Dept: NEUROLOGY | Age: 54
End: 2019-11-22

## 2019-11-22 VITALS — SYSTOLIC BLOOD PRESSURE: 100 MMHG | DIASTOLIC BLOOD PRESSURE: 58 MMHG

## 2019-11-22 DIAGNOSIS — R46.89 CHANGE IN BEHAVIOR: ICD-10-CM

## 2019-11-22 DIAGNOSIS — R11.10 VOMITING, INTRACTABILITY OF VOMITING NOT SPECIFIED, PRESENCE OF NAUSEA NOT SPECIFIED, UNSPECIFIED VOMITING TYPE: ICD-10-CM

## 2019-11-22 DIAGNOSIS — R11.0 NAUSEA: ICD-10-CM

## 2019-11-22 DIAGNOSIS — R13.10 DYSPHAGIA, UNSPECIFIED TYPE: ICD-10-CM

## 2019-11-22 DIAGNOSIS — G40.019 PARTIAL IDIOPATHIC EPILEPSY WITH SEIZURES OF LOCALIZED ONSET, INTRACTABLE, WITHOUT STATUS EPILEPTICUS (HCC): Primary | ICD-10-CM

## 2019-11-22 DIAGNOSIS — G40.019 PARTIAL IDIOPATHIC EPILEPSY WITH SEIZURES OF LOCALIZED ONSET, INTRACTABLE, WITHOUT STATUS EPILEPTICUS (HCC): ICD-10-CM

## 2019-11-22 DIAGNOSIS — F51.8 DISRUPTED SLEEP-WAKE CYCLE: ICD-10-CM

## 2019-11-22 DIAGNOSIS — G47.20 DISRUPTED SLEEP-WAKE CYCLE: ICD-10-CM

## 2019-11-22 NOTE — PATIENT INSTRUCTIONS
PRESCRIPTION REFILL POLICY [facility] Statement to Patients April 1, 2014 In an effort to ensure the large volume of patient prescription refills is processed in the most efficient and expeditious manner, we are asking our patients to assist us by calling your Pharmacy for all prescription refills, this will include also your  Mail Order Pharmacy. The pharmacy will contact our office electronically to continue the refill process. Please do not wait until the last minute to call your pharmacy. We need at least 48 hours (2days) to fill prescriptions. We also encourage you to call your pharmacy before going to  your prescription to make sure it is ready. With regard to controlled substance prescription refill requests (narcotic refills) that need to be picked up at our office, we ask your cooperation by providing us with at least 72 hours (3days) notice that you will need a refill. We will not refill narcotic prescription refill requests after 4:00pm on any weekday, Monday through Thursday, or after 2:00pm on Fridays, or on the weekends. We encourage everyone to explore another way of getting your prescription refill request processed using [employer], our patient web portal through our electronic medical record system. [employer] is an efficient and effective way to communicate your medication request directly to the office and  downloadable as an leif on your smart phone . [employer] also features a review functionality that allows you to view your medication list as well as leave messages for your physician. Are you ready to get connected? If so please review the attatched instructions or speak to any of our staff to get you set up right away! Thank you so much for your cooperation. Should you have any questions please contact our Practice Administrator. RESULT POLICY If we have ordered testing for you, know that; \"NO NEWS IS GOOD NEWS! \" It is our policy that we know longer call patients with results, nor do we  give test results over the phone. We schedule follow up appointments so that your results can be discussed in person. This allows you to address any questions you have regarding the results. If you choose to go to an imaging center outside of [facility], it is your responsibility to bring imaging report and disc to follow up appointment. If something of concern is revealed on your test, we will contact you to discuss the matter and if needed schedule a sooner follow up appointment. Additionally, results may be found by using the My Chart feature and one of our patient service representatives at the  can give you instructions on how to access this feature to utilize our electronic medical record system. Thank you for your understanding.

## 2019-11-23 LAB
PHENOBARB SERPL-MCNC: 24 UG/ML (ref 15–40)
VALPROATE SERPL-MCNC: 110 UG/ML (ref 50–100)

## 2019-11-25 ENCOUNTER — TELEPHONE (OUTPATIENT)
Dept: NEUROLOGY | Age: 54
End: 2019-11-25

## 2019-11-25 DIAGNOSIS — G40.019 PARTIAL IDIOPATHIC EPILEPSY WITH SEIZURES OF LOCALIZED ONSET, INTRACTABLE, WITHOUT STATUS EPILEPTICUS (HCC): ICD-10-CM

## 2019-11-25 DIAGNOSIS — F51.8 DISRUPTED SLEEP-WAKE CYCLE: ICD-10-CM

## 2019-11-25 DIAGNOSIS — R13.10 DYSPHAGIA, UNSPECIFIED TYPE: ICD-10-CM

## 2019-11-25 DIAGNOSIS — R46.89 CHANGE IN BEHAVIOR: ICD-10-CM

## 2019-11-25 DIAGNOSIS — G47.20 DISRUPTED SLEEP-WAKE CYCLE: ICD-10-CM

## 2019-11-25 NOTE — TELEPHONE ENCOUNTER
----- Message from Etta Abraham sent at 11/25/2019 10:46 AM EST -----  Regarding: Dr. Artemisa Ahumada  Pt needs a new order saying needs sedation for his [place].  Contact is [phone]

## 2019-11-26 ENCOUNTER — TELEPHONE (OUTPATIENT)
Dept: NEUROLOGY | Age: 54
End: 2019-11-26

## 2019-11-26 NOTE — ADDENDUM NOTE
Addended by: Mahesh Falk on: 11/25/2019 11:49 AM     Modules accepted: Orders
Addended by: Sherren Brooks on: 11/26/2019 02:35 PM     Modules accepted: Orders
Addended by: Tricia Landrum on: 11/25/2019 11:56 AM     Modules accepted: Orders
skin normal color for race, warm, dry and intact.

## 2019-11-26 NOTE — TELEPHONE ENCOUNTER
----- Message from Reji Green sent at 11/26/2019 11:10 AM EST -----  Regarding: Dr. Fadumo Bueno Message/Vendor Calls    Caller's first and last name:[facility] CAMPBELL ([facility])      Reason for call: Requesting for instruction for pt's MRI and EEG to be resent to scheduling, stating the pt need to be Sedated during procedures so appt could not be scheduled.         Callback required yes/no and why: Yes      Best contact number(s):[phone]      Details to clarify the request:      Reji Green

## 2019-11-26 NOTE — TELEPHONE ENCOUNTER
SARINA on parent phone, orders sent to [facility] at [address] [phone] to see if sooner appt may be available as Gerson Manzanares is out until Feb

## 2019-11-26 NOTE — TELEPHONE ENCOUNTER
Iris Meza calling she thinks that was you calling when she was on the phone please call back [phone]

## 2019-12-12 NOTE — PROGRESS NOTES
Bedside and Verbal shift change report given to [address] [place] [address] (oncoming nurse) by Ashlyn Arauz (offgoing nurse). Report included the following information SBAR, Kardex, Intake/Output, MAR and Recent Results. Form filled out as requested and given to nurse for  or fax.

## 2019-12-13 DIAGNOSIS — G40.019 PARTIAL IDIOPATHIC EPILEPSY WITH SEIZURES OF LOCALIZED ONSET, INTRACTABLE, WITHOUT STATUS EPILEPTICUS (HCC): ICD-10-CM

## 2020-01-14 ENCOUNTER — TELEPHONE (OUTPATIENT)
Dept: NEUROLOGY | Age: 55
End: 2020-01-14

## 2020-01-14 NOTE — TELEPHONE ENCOUNTER
----- Message from Aline Parker sent at 1/14/2020  1:47 PM EST -----  Regarding: Dr. Edwin Stahl would like to know if she can still give her son breakfast. Contact is [phone]

## 2020-01-14 NOTE — TELEPHONE ENCOUNTER
Pt has PAT tomorrow to see if he is able to have sedation and she wanted to know if pt may eat/drink prior. Advised her she needs to s/w someone there as I do not know their protocols. Advised her he may take AEDs with sip of water but outside of that, I do not know.

## 2020-02-05 ENCOUNTER — TELEPHONE (OUTPATIENT)
Dept: NEUROLOGY | Age: 55
End: 2020-02-05

## 2020-02-06 NOTE — TELEPHONE ENCOUNTER
S/w pt's mother, on ysabela, she was frustrated because pt's appt on 2/13/20 was cxl's d/t Dr. Poppy Villalba being on call in the morning and next available was not until end of March. She wanted to know why it took so long to have testing done then longer to be seen. Explained that unfortunately, since pt required sedation for his procedures, that took a lot longer to schedule as there is not nearly as much flexibility with scheduling since it needs to be done in hospital with anesthesiologist.  As far as our appts, notified her that the people answering phones can only offer appts that are available. If she needs sooner appt, she may request to have me call her back to schedule as I have more flexibility with Dr. Poppy Villalba' schedule.   She was satisfied with these answerers and has appt on Monday, February 24, 2020 09:20 AM

## 2020-02-24 ENCOUNTER — OFFICE VISIT (OUTPATIENT)
Dept: NEUROLOGY | Age: 55
End: 2020-02-24

## 2020-02-24 VITALS
DIASTOLIC BLOOD PRESSURE: 60 MMHG | TEMPERATURE: 98.6 F | OXYGEN SATURATION: 99 % | SYSTOLIC BLOOD PRESSURE: 118 MMHG | WEIGHT: 87 LBS | BODY MASS INDEX: 13.98 KG/M2 | RESPIRATION RATE: 16 BRPM | HEIGHT: 66 IN | HEART RATE: 68 BPM

## 2020-02-24 DIAGNOSIS — G40.019 PARTIAL IDIOPATHIC EPILEPSY WITH SEIZURES OF LOCALIZED ONSET, INTRACTABLE, WITHOUT STATUS EPILEPTICUS (HCC): Primary | ICD-10-CM

## 2020-02-24 RX ORDER — DIVALPROEX SODIUM 500 MG/1
TABLET, DELAYED RELEASE ORAL
Qty: 120 TAB | Refills: 5 | Status: SHIPPED | OUTPATIENT
Start: 2020-02-24 | End: 2020-06-29

## 2020-02-24 NOTE — PROGRESS NOTES
[facility] and [address] at [facility] at [address] [facility], [address] [facility] [address] [facility], [address] [place] [address]   [phone]              Chief Complaint   Patient presents with    Results     Patient is here today to discuss results of EEG, dopplar and MRI done at [facility]. Current Outpatient Medications   Medication Sig Dispense Refill    divalproex DR (DEPAKOTE) 125 mg tablet Take 250 mg by mouth daily (with dinner). Takes with 500mg at dinner.  aspirin delayed-release 81 mg tablet Take 81 mg by mouth daily (with dinner).  ascorbic acid, vitamin C, powd Take 1,000 mg by mouth daily. Mix with liquid prior to administration.  omeprazole (PRILOSEC) 20 mg capsule Take 20 mg by mouth Before breakfast and dinner.  PHENobarbital (LUMINAL) 30 mg tablet Take 30 mg by mouth two (2) times a day.  acetaminophen (TYLENOL) 500 mg tablet Take 1,000 mg by mouth every six (6) hours as needed for Pain.  DOCUSATE CALCIUM (STOOL SOFTENER PO) Take 1 tablet by mouth two (2) times daily as needed.  divalproex DR (DEPAKOTE) 500 mg tablet Take 1 Tab by mouth two (2) times a day. 60 Tab 0    amoxicillin-clavulanate (AUGMENTIN) 875-125 mg per tablet Take 1 Tab by mouth two (2) times daily (with meals). 10 Tab 0      Allergies   Allergen Reactions    Other Medication Swelling     Mother states the patient had a reaction to an antibiotic as a child  She cannot recall the name of the antibiotic nor the indication   Per [facility] [phone] the patient has had prescriptions for Augmentin  Tolerates Cefdinir. Social History     Tobacco Use    Smoking status: Never Smoker    Smokeless tobacco: Never Used   Substance Use Topics    Alcohol use: No    Drug use: No   Patient returns today for follow-up.   50-year-old gentleman I saw him back in November 2019 after previously seen by Dr. Марина Sotelo. He has cerebral palsy with quadriplegia and epilepsy. In any regard he was having a good amount of yelling and vomiting as well as complaints of personality change and behavior changes. We went ahead and sent him for an MRI of the brain as well as an. I have the report of the [facility] done [facility] that did not demonstrate anything acute just sequela of his underlying condition. We have been unable to obtain the results of the EEG and the Doppler. He continues to have episodes of nausea and some dry heaving at times. He also is having what they are calling seizures on awakening. He on awakening will have several myoclonic jerks. Sometimes these can last up to 15 minutes or so. Compliant with medicines. Not throwing up the medicine. Several questions today related to his MRI, potential etiologies for the nausea, changes in anticonvulsants. Today we discussed at length these questions. We will increase Depakote from 750 twice daily up to a dose of 1000 mg twice daily to see if this can help with what is described as myoclonus on awakening. If he continues to have these events frequently that we will put him in the epilepsy monitoring unit and try to capture those for accurate diagnosis    Follow in 3 months and we will [facility] to [facility] those [facility]    Total time: 30 min   Counseling / coordination time: 20 min   > 50% counseling / coordination?: Yes re: as documented         Examination  Visit Vitals  /60   Pulse 68   Temp 98.6 °F (37 °C) (Oral)   Resp 16   Ht 5' 6\" (1.676 m)   Wt 39.5 kg (87 lb)   SpO2 99%   BMI 14.04 kg/m²         Impression/Plan      Harper Mendoza MD      This note was created using voice recognition software. Despite editing, there may be syntax errors. This note will not be viewable in [address]

## 2020-02-24 NOTE — PROGRESS NOTES
Chief Complaint   Patient presents with    Results     Patient is here today to discuss results of EEG, dopplar and MRI done at [facility].      Visit Vitals  /60   Pulse 68   Temp 98.6 °F (37 °C) (Oral)   Resp 16   Ht 5' 6\" (1.676 m)   Wt 39.5 kg (87 lb)   SpO2 99%   BMI 14.04 kg/m²

## 2020-04-23 ENCOUNTER — TELEPHONE (OUTPATIENT)
Dept: NEUROLOGY | Age: 55
End: 2020-04-23

## 2020-04-23 NOTE — TELEPHONE ENCOUNTER
Called and spoke to Virginia Lujan (on Melon). She wants Dr. Jason Milan office visits (2) sent to his PCP--Sydni Mohr. Routed in [address] [facility].

## 2020-04-23 NOTE — TELEPHONE ENCOUNTER
----- Message from Haroldo Berg sent at 4/23/2020 12:18 PM EDT -----  Regarding: Dr. Rylan Guzman Message/Vendor Calls    Caller's first and last name:Shanti Tamez(mother)      Reason for call:requested pt's information sent to his pcp      Callback required yes/no and why:yes, to confirm      Best contact number(s):[phone]      Details to clarify the request:Pt's mother stated pt's pcp(Dr. Ana Lopez) advised she have not received any information from the doctor on the pt and would like it sent to Dr. Sary Lacey (v)[phone], fax number unavailable.       Haroldo Berg

## 2020-05-01 ENCOUNTER — APPOINTMENT (OUTPATIENT)
Dept: CT IMAGING | Age: 55
End: 2020-05-01
Attending: EMERGENCY MEDICINE
Payer: MEDICARE

## 2020-05-01 ENCOUNTER — HOSPITAL ENCOUNTER (EMERGENCY)
Age: 55
Discharge: HOME | End: 2020-05-02
Attending: EMERGENCY MEDICINE | Admitting: EMERGENCY MEDICINE
Payer: MEDICARE

## 2020-05-01 VITALS
SYSTOLIC BLOOD PRESSURE: 128 MMHG | RESPIRATION RATE: 19 BRPM | OXYGEN SATURATION: 99 % | HEART RATE: 95 BPM | WEIGHT: 80 LBS | BODY MASS INDEX: 12.56 KG/M2 | HEIGHT: 67 IN | TEMPERATURE: 98.4 F | DIASTOLIC BLOOD PRESSURE: 89 MMHG

## 2020-05-01 DIAGNOSIS — S01.81XA FACIAL LACERATION, INITIAL ENCOUNTER: Primary | ICD-10-CM

## 2020-05-01 PROCEDURE — 70486 CT MAXILLOFACIAL W/O DYE: CPT

## 2020-05-01 PROCEDURE — 99281 EMR DPT VST MAYX REQ PHY/QHP: CPT

## 2020-05-01 PROCEDURE — 70450 CT HEAD/BRAIN W/O DYE: CPT

## 2020-05-01 PROCEDURE — 75810000293 HC SIMP/SUPERF WND  RPR

## 2020-05-02 PROCEDURE — 75810000293 HC SIMP/SUPERF WND  RPR

## 2020-05-02 PROCEDURE — 77030010507 HC ADH SKN DERMBND J&J -B

## 2020-05-02 NOTE — ED TRIAGE NOTES
Pt. Ender Ramos in by mother, states she was transferring him and they fell to the ground, noted he hit the right side of his head. Pt. Has small laceration noted above right eye bleeding controlled.

## 2020-05-02 NOTE — DISCHARGE INSTRUCTIONS
Patient Education        Cuts Closed With Adhesives: Care Instructions  Your Care Instructions  A cut can happen anywhere on your body. The doctor used an adhesive to close the cut. When the adhesive dries, it forms a film that holds the edges of the cut together. Skin adhesives are sometimes called liquid stitches. If the cut went deep and through the skin, the doctor may have put in a layer of stitches below the adhesive. The deeper layer of stitches brings the deep part of the cut together. These stitches will dissolve and don't need to be removed. You don't see the stitches, only the adhesive. You may have a bandage. The doctor has checked you carefully, but problems can develop later. If you notice any problems or new symptoms, get medical treatment right away. Follow-up care is a key part of your treatment and safety. Be sure to make and go to all appointments, and call your doctor if you are having problems. It's also a good idea to know your test results and keep a list of the medicines you take. How can you care for yourself at home? · Keep the cut dry for the first 24 to 48 hours. After this, you can shower if your doctor okays it. Pat the cut dry. · Don't soak the cut, such as in a bathtub. Your doctor will tell you when it's safe to get the cut wet. · If your doctor told you how to care for your cut, follow your doctor's instructions. If you did not get instructions, follow this general advice:  ? Do not put any kind of ointment, cream, or lotion over the area. This can make the adhesive fall off too soon. ? After the first 24 to 48 hours, wash around the cut with clean water 2 times a day. Do not use hydrogen peroxide or alcohol, which can slow healing. ? If the doctor told you to use a bandage, put on a new bandage after cleaning the cut or if the bandage gets wet or dirty. · Prop up the sore area on a pillow anytime you sit or lie down during the next 3 days.  Try to keep it above the level of your heart. This will help reduce swelling. · Leave the skin adhesive on your skin until it falls off on its own. This may take 5 to 10 days. · Do not scratch, rub, or pick at the adhesive. · Do not put the sticky part of a bandage directly on the adhesive. · Avoid any activity that could cause your cut to reopen. · Be safe with medicines. Read and follow all instructions on the label. ? If the doctor gave you a prescription medicine for pain, take it as prescribed. ? If you are not taking a prescription pain medicine, ask your doctor if you can take an over-the-counter medicine. When should you call for help? Call your doctor now or seek immediate medical care if:    · You have new pain, or your pain gets worse.     · The skin near the cut is cold or pale or changes color.     · You have tingling, weakness, or numbness near the cut.     · The cut starts to bleed.     · You have trouble moving the area near the cut.     · You have symptoms of infection, such as:  ? Increased pain, swelling, warmth, or redness around the cut.  ? Red streaks leading from the cut.  ? Pus draining from the cut.  ? A fever.    Watch closely for changes in your health, and be sure to contact your doctor if:    · The cut reopens.     · You do not get better as expected. Where can you learn more? Go to http://maria a-palmer.info/  Enter P174 in the search box to learn more about \"Cuts Closed With Adhesives: Care Instructions. \"  Current as of: June 26, 2019Content Version: 12.4  © 6770-8989 [employer], Incorporated. Care instructions adapted under license by [employer] (which disclaims liability or warranty for this information). If you have questions about a medical condition or this instruction, always ask your healthcare professional. [address] any warranty or liability for your use of this information.

## 2020-05-02 NOTE — ED PROVIDER NOTES
81-year-old male with history of cerebral palsy, scoliosis, and seizure disorder slipped to the ground with his mother transferring him from the wheelchair when he hit his head on hardwood. He had bleeding with what looked like a large laceration to the area just above his right eye. He is sleepy, but it is around his bedtime. He has not had any vomiting. Last tetanus was within the last 5 years.            Past Medical History:   Diagnosis Date    Aspiration pneumonia (HCC)     Cerebral palsy (HCC)     Ill-defined condition     dsyphasia    Scoliosis     Seizure disorder (HCC)     Seizures (HCC)        Past Surgical History:   Procedure Laterality Date    HX HEENT           Family History:   Problem Relation Age of Onset    Cancer Father         prostate    Cancer Maternal Aunt     Heart Disease Maternal Aunt     Cancer Maternal Grandmother     Cancer Maternal Grandfather     Cancer Paternal Grandmother     Cancer Paternal Grandfather     Cancer Other        Social History     Socioeconomic History    Marital status: SINGLE     Spouse name: Not on file    Number of children: Not on file    Years of education: Not on file    Highest education level: Not on file   Occupational History    Not on file   Social Needs    Financial resource strain: Not on file    Food insecurity     Worry: Not on file     Inability: Not on file    Transportation needs     Medical: Not on file     Non-medical: Not on file   Tobacco Use    Smoking status: Never Smoker    Smokeless tobacco: Never Used   Substance and Sexual Activity    Alcohol use: No    Drug use: No    Sexual activity: Not on file   Lifestyle    Physical activity     Days per week: Not on file     Minutes per session: Not on file    Stress: Not on file   Relationships    Social connections     Talks on phone: Not on file     Gets together: Not on file     Attends [employer] service: Not on file     Active member of club or organization: Not on file     Attends meetings of clubs or organizations: Not on file     Relationship status: Not on file    Intimate partner violence     Fear of current or ex partner: Not on file     Emotionally abused: Not on file     Physically abused: Not on file     Forced sexual activity: Not on file   Other Topics Concern    Not on file   Social History Narrative    Not on file         ALLERGIES: Other medication    Review of Systems   Constitutional: Negative for fever. HENT: Negative for trouble swallowing. Eyes: Negative for visual disturbance. Respiratory: Negative for cough. Cardiovascular: Negative for chest pain. Gastrointestinal: Negative for abdominal pain. Genitourinary: Negative for difficulty urinating. Musculoskeletal: Negative for gait problem. Skin: Negative for rash. Neurological: Negative for headaches. Hematological: Does not bruise/bleed easily. Psychiatric/Behavioral: Negative for sleep disturbance. Vitals:    05/01/20 2308   BP: 128/89   Pulse: 95   Resp: 19   Temp: 98.4 °F (36.9 °C)   SpO2: 99%   Weight: 36.3 kg (80 lb)   Height: 5' 7\" (1.702 m)            Physical Exam  Constitutional:       Appearance: Normal appearance. HENT:      Head: Normocephalic. Comments: 1 cm laceration just under the eyebrow on the right, laterally     Nose: Nose normal.      Mouth/Throat:      Mouth: Mucous membranes are moist.   Eyes:      Extraocular Movements: Extraocular movements intact. Conjunctiva/sclera: Conjunctivae normal.   Cardiovascular:      Rate and Rhythm: Normal rate. Pulmonary:      Effort: Pulmonary effort is normal. No respiratory distress. Abdominal:      General: Abdomen is flat. Musculoskeletal: Normal range of motion. Skin:     Findings: No rash. Neurological:      General: No focal deficit present. Mental Status: He is alert.    Psychiatric:         Behavior: Behavior normal.          MDM       Wound Closure by Adhesive  Date/Time: 5/2/2020 1:11 AM  Performed by: Esdras Brar DO  Authorized by: Esdras Brar DO     Consent:     Consent obtained:  Verbal    Consent given by:  Parent    Risks discussed:  Infection, pain, poor cosmetic result, retained foreign body and poor wound healing    Alternatives discussed:  No treatment and delayed treatment  Anesthesia (see MAR for exact dosages): Anesthesia method:  None  Laceration details:     Location:  Face    Face location:  R eyebrow    Length (cm):  1  Repair type:     Repair type:  Simple  Pre-procedure details:     Preparation:  Imaging obtained to evaluate for foreign bodies  Exploration:     Contaminated: no    Treatment:     Area cleansed with:  Chivo    Amount of cleaning:  Standard    Visualized foreign bodies/material removed: no    Post-procedure details:     Dressing:  Adhesive bandage    Patient tolerance of procedure:   Tolerated well, no immediate complications

## 2020-06-29 RX ORDER — DIVALPROEX SODIUM 500 MG/1
TABLET, DELAYED RELEASE ORAL
Qty: 360 TAB | Refills: 1 | Status: SHIPPED | OUTPATIENT
Start: 2020-06-29

## 2021-03-17 ENCOUNTER — HOSPITAL ENCOUNTER (OUTPATIENT)
Dept: CT IMAGING | Age: 56
Discharge: HOME | End: 2021-03-17
Attending: NURSE PRACTITIONER
Payer: MEDICARE

## 2021-03-17 ENCOUNTER — TRANSCRIBE ORDER (OUTPATIENT)
Dept: SCHEDULING | Age: 56
End: 2021-03-17

## 2021-03-17 DIAGNOSIS — K21.9 ESOPHAGEAL REFLUX: ICD-10-CM

## 2021-03-17 DIAGNOSIS — R10.84 ABDOMINAL PAIN, GENERALIZED: ICD-10-CM

## 2021-03-17 DIAGNOSIS — R13.19 ESOPHAGEAL DYSPHAGIA: Primary | ICD-10-CM

## 2021-03-17 DIAGNOSIS — R13.19 ESOPHAGEAL DYSPHAGIA: ICD-10-CM

## 2021-03-17 PROCEDURE — 74177 CT ABD & PELVIS W/CONTRAST: CPT

## 2021-03-17 PROCEDURE — 74011000636 HC RX REV CODE- 636: Performed by: RADIOLOGY

## 2021-03-17 RX ADMIN — IOPAMIDOL 100 ML: 755 INJECTION, SOLUTION INTRAVENOUS at 18:17

## 2022-03-18 PROBLEM — G40.909 SEIZURE DISORDER (HCC): Status: ACTIVE | Noted: 2017-07-08

## 2022-03-19 PROBLEM — I95.9 HYPOTENSION: Status: ACTIVE | Noted: 2017-07-08

## 2022-03-19 PROBLEM — L89.90 DECUBITUS ULCERS: Status: ACTIVE | Noted: 2018-12-30

## 2022-03-19 PROBLEM — J69.0 ASPIRATION PNEUMONIA (HCC): Status: ACTIVE | Noted: 2017-07-08

## 2022-03-19 PROBLEM — M79.601 RIGHT ARM PAIN: Status: ACTIVE | Noted: 2018-12-30

## 2022-03-19 PROBLEM — A41.9 SEVERE SEPSIS (HCC): Status: ACTIVE | Noted: 2018-12-30

## 2022-03-19 PROBLEM — R65.20 SEVERE SEPSIS (HCC): Status: ACTIVE | Noted: 2018-12-30

## 2022-03-20 PROBLEM — A41.9 SEPSIS (HCC): Status: ACTIVE | Noted: 2017-07-08

## 2022-03-20 PROBLEM — G93.41 ACUTE METABOLIC ENCEPHALOPATHY: Status: ACTIVE | Noted: 2018-12-30

## 2022-09-29 NOTE — PROGRESS NOTES
Gastrointestinal Progress Note    7/13/2017    Admit Date: 7/8/2017    Subjective:     New Complaints Today: Patient passed swallow evaluation so no need for TPN. Discussed with patient mother and she states they are not ready to proceed with feeding tube at this time. Per review of records patient passed swallow evaluation yesterday. Seen earlier and patient was waiting on lunch tray.     Patient cannot provide history due to mental status    Current Facility-Administered Medications   Medication Dose Route Frequency    0.9% sodium chloride infusion  75 mL/hr IntraVENous CONTINUOUS    levoFLOXacin (LEVAQUIN) 750 mg in D5W IVPB  750 mg IntraVENous DAILY    PHENobarbital (LUMINAL) injection 30 mg  30 mg IntraVENous Q12H    heparin (porcine) injection 5,000 Units  5,000 Units SubCUTAneous Q12H    pantoprazole (PROTONIX) 40 mg in sodium chloride 0.9 % 10 mL injection  40 mg IntraVENous DAILY    acetylcysteine (MUCOMYST) 200 mg/mL (20 %) solution 400 mg  2 mL Nebulization BID RT    levalbuterol (XOPENEX) nebulizer soln 1.25 mg/3 mL  1.25 mg Nebulization BID RT    albuterol-ipratropium (DUO-NEB) 2.5 MG-0.5 MG/3 ML  3 mL Nebulization Q4H PRN    ascorbic acid (vitamin C) (VITAMIN C) tablet 500 mg  500 mg Oral BID    polyethylene glycol (MIRALAX) packet 17 g  17 g Oral DAILY PRN    sodium chloride (NS) flush 5-10 mL  5-10 mL IntraVENous Q8H    sodium chloride (NS) flush 5-10 mL  5-10 mL IntraVENous PRN    HYDROmorphone (PF) (DILAUDID) injection 0.2 mg  0.2 mg IntraVENous Q4H PRN    naloxone (NARCAN) injection 0.4 mg  0.4 mg IntraVENous PRN    diphenhydrAMINE (BENADRYL) injection 12.5 mg  12.5 mg IntraVENous Q4H PRN    ondansetron (ZOFRAN) injection 4 mg  4 mg IntraVENous Q6H PRN    docusate sodium (COLACE) capsule 100 mg  100 mg Oral BID    acetaminophen (TYLENOL) suppository 650 mg  650 mg Rectal Q4H PRN    diphenhydrAMINE (BENADRYL) injection 12.5 mg  12.5 mg IntraVENous QHS    valproate (DEPACON) 750 mg in 0.9% sodium chloride 50 mL IVPB  750 mg IntraVENous Q24H    valproate (DEPACON) 625 mg in 0.9% sodium chloride 50 mL IVPB  625 mg IntraVENous Q24H        Objective:     Blood pressure 108/74, pulse 83, temperature 96 °F (35.6 °C), resp. rate 16, height 5' 7\" (1.702 m), weight 39.7 kg (87 lb 8.4 oz), SpO2 100 %.    07/13 0701 - 07/13 1900  In: 593.8 [P.O.:240; I.V.:353.8]  Out: 150 [Urine:150]    07/11 1901 - 07/13 0700  In: 1048.8 [I.V.:1048.8]  Out: 690 [Urine:690]    EXAM:  GENERAL: alert, no distress, LUNGS:breathing nonlabored ABDOMEN: soft, nontender  Data Review    Recent Results (from the past 24 hour(s))   CBC WITH AUTOMATED DIFF    Collection Time: 07/13/17  9:04 AM   Result Value Ref Range    WBC 2.4 (L) 4.1 - 11.1 K/uL    RBC 3.40 (L) 4.10 - 5.70 M/uL    HGB 10.8 (L) 12.1 - 17.0 g/dL    HCT 32.8 (L) 36.6 - 50.3 %    MCV 96.5 80.0 - 99.0 FL    MCH 31.8 26.0 - 34.0 PG    MCHC 32.9 30.0 - 36.5 g/dL    RDW 12.1 11.5 - 14.5 %    PLATELET 101 (L) 356 - 400 K/uL    NEUTROPHILS 74 32 - 75 %    LYMPHOCYTES 13 12 - 49 %    MONOCYTES 13 5 - 13 %    EOSINOPHILS 0 0 - 7 %    BASOPHILS 0 0 - 1 %    ABS. NEUTROPHILS 1.8 1.8 - 8.0 K/UL    ABS. LYMPHOCYTES 0.3 (L) 0.8 - 3.5 K/UL    ABS. MONOCYTES 0.3 0.0 - 1.0 K/UL    ABS. EOSINOPHILS 0.0 0.0 - 0.4 K/UL    ABS.  BASOPHILS 0.0 0.0 - 0.1 K/UL    DF MANUAL      RBC COMMENTS NORMOCYTIC, NORMOCHROMIC     METABOLIC PANEL, BASIC    Collection Time: 07/13/17  9:04 AM   Result Value Ref Range    Sodium 140 136 - 145 mmol/L    Potassium 3.7 3.5 - 5.1 mmol/L    Chloride 103 97 - 108 mmol/L    CO2 27 21 - 32 mmol/L    Anion gap 10 5 - 15 mmol/L    Glucose 71 65 - 100 mg/dL    BUN 9 6 - 20 MG/DL    Creatinine 0.34 (L) 0.70 - 1.30 MG/DL    BUN/Creatinine ratio 26 (H) 12 - 20      GFR est AA >60 >60 ml/min/1.73m2    GFR est non-AA >60 >60 ml/min/1.73m2    Calcium 8.3 (L) 8.5 - 10.1 MG/DL   MAGNESIUM    Collection Time: 07/13/17  9:04 AM   Result Value Ref Range    Magnesium 1.5 (L) 1.6 - 2.4 mg/dL       Assessment:     Principal Problem:    Sepsis ([facility] [address] (7/8/2017)    Active Problems:    Cerebral palsy ([address] (6/15/2012)      Aspiration pneumonia ([facility] [address] (7/8/2017)      Hypotension (7/8/2017)      Acute encephalopathy (7/8/2017)        Plan:     Family declines PEG tube at this time  Patient currently on diet per speech recommendations  Will sign off but please reconsult if any changes in plan    Timothy Obrien  07/13/17  2:24 PM    I have interviewed and examined patient with addendum to note above and formulation care plan    Ratna Wong M.D. 15

## 2023-05-22 RX ORDER — PHENOBARBITAL 30 MG/1
30 TABLET ORAL 2 TIMES DAILY
COMMUNITY

## 2023-05-22 RX ORDER — ACETAMINOPHEN 500 MG
1000 TABLET ORAL EVERY 6 HOURS PRN
COMMUNITY

## 2023-05-22 RX ORDER — OMEPRAZOLE 20 MG/1
20 CAPSULE, DELAYED RELEASE ORAL
COMMUNITY

## 2023-05-22 RX ORDER — ASPIRIN 81 MG/1
81 TABLET ORAL
COMMUNITY

## 2023-05-22 RX ORDER — DIVALPROEX SODIUM 500 MG/1
2 TABLET, DELAYED RELEASE ORAL 2 TIMES DAILY
COMMUNITY
Start: 2020-06-29

## 2024-04-12 NOTE — ROUTINE PROCESS
Bedside and Verbal shift change report given to EULALIO Stokes (oncoming nurse) by Luzma Ordonez RN (offgoing nurse). Report included the following information SBAR, Kardex, Intake/Output, MAR, Accordion, Recent Results, Cardiac Rhythm SR/ST and Alarm Parameters .
Bedside and Verbal shift change report given to Garfield Vazquez (oncoming nurse) by aKte Gabriel RN (offgoing nurse). Report included the following information SBAR, Kardex, Intake/Output, MAR, Accordion and Recent Results. Bedside and Verbal shift change report given to Maddie Perez RN (oncoming nurse) by Nina Arshad RN (offgoing nurse). Report included the following information SBAR, Kardex, Intake/Output, MAR, Accordion and Recent Results.
Bedside and Verbal shift change report given to Juan Lockhart rn (oncoming nurse) by Florentin Gomez (offgoing nurse). Report included the following information SBAR, Kardex, Procedure Summary, Intake/Output, MAR and Recent Results.
Bedside and Verbal shift change report given to Luzma Ordonez RN (oncoming nurse) by Rebecca Peterson RN (offgoing nurse). Report included the following information SBAR, Kardex, Intake/Output, MAR, Accordion, Recent Results, Cardiac Rhythm SR/ST and Alarm Parameters .
Statement Selected

## 2024-04-18 NOTE — ED NOTES
[facility] Called.
Dr. Daniel Storey at bedside.
Pt cleaned and new diaper placed on patient.
Pt drowsy and snoring, BP low as noted, IV fluids infusing. NSR on monitor, family at bedside.
Pt transferred at this time to room 322 on cardiac monitor by EULALIO Hills
Seizure pads applied to patient.
Verbal report given to EULALIO Mcamhon on [facility] being transferred to telemetry for routine progression of care    Report consisted of patient's Situation, Background, Assessment and Recommendations (SBAR)    Information from the following report(s)  SBAR, Kardex, ED Summary, MAR, Med Rec Status and Cardiac Rhythm NSR was reviewed with the receiving nurse. Opportunity for questions and clarification was provided.     Patient transported with:  Monitor  Registered Nurse    Last Filed Values:  Temp: 99.8 °F (37.7 °C) (06/23/18 1250)  Pulse (Heart Rate): 88 (06/23/18 1445)  Resp Rate: 12 (06/23/18 1445)  O2 Sat (%): 99 % (06/23/18 1445)  BP: (!) 88/64 (06/23/18 1445)  MAP (Monitor): 71 (06/23/18 1445)  MAP (Calculated): (!) 63 (06/23/18 1250)  Level of Consciousness: Alert (06/23/18 1250)      Lab Results   Component Value Date/Time    WBC 19.8 (H) 06/23/2018 01:00 PM    Lactic acid 1.6 06/23/2018 01:00 PM       Repeat LA:  Time Due N/A    Blood Cultures Drawn:  yes    Fluid Resuscitation:  Total needed 75ccs, Status infusing    All Antibiotics Started:  yes, Dose Due N/A    VS x 2 post-fluid resuscitation:   no waiting for fluids to complete    Vasopressor Infusion:  no N/A
none

## 2024-06-21 NOTE — ED PROVIDER NOTES
HPI Comments: 47 y.o. male with past medical history significant for cerebral palsy, scoliosis, seizures, and aspiration pneumonia who presents via EMS with chief complaint of shortness of breath. Patient is nonverbal and does not follow commands at baseline. Patient's mother reports that patient was found to \"snoring with his eyes wide open. \" She states patient had sleep apnea and notes patient had difficulty breathing. Patient was difficult to arouse. Patient had a big bowel movement this morning. His anal temperature was 100, and his mother notes that today was the first day of his fever. Of note, patient did not have recent seizures and takes seizure medications. His blood pressure is normal at baseline. Patient was hospitalized for pneumonia last July. There are no other acute medical concerns at this time. Social hx: Denies tobacco use; denies alcohol use; denies illicit drug use  PCP: Laron Reyes MD    Note written by Miranda Acosta, as dictated by Oscar Conroy MD 12:54 PM      The history is provided by a parent. No  was used. Past Medical History:   Diagnosis Date    Aspiration pneumonia (HCC)     Cerebral palsy (HCC)     Ill-defined condition     dsyphasia    Scoliosis     Seizure disorder (HCC)     Seizures (HCC)        Past Surgical History:   Procedure Laterality Date    HX HEENT           Family History:   Problem Relation Age of Onset    Cancer Father      prostate    Cancer Maternal Aunt     Heart Disease Maternal Aunt     Cancer Maternal Grandmother     Cancer Maternal Grandfather     Cancer Paternal Grandmother     Cancer Paternal Grandfather     Cancer Other        Social History     Social History    Marital status: SINGLE     Spouse name: N/A    Number of children: N/A    Years of education: N/A     Occupational History    Not on file.      Social History Main Topics    Smoking status: Never Smoker    Smokeless tobacco: Never Used  Alcohol use No    Drug use: No    Sexual activity: Not on file     Other Topics Concern    Not on file     Social History Narrative         ALLERGIES: Other medication    Review of Systems   Constitutional: Positive for fever. Negative for appetite change and unexpected weight change. HENT: Negative. Negative for ear pain, hearing loss, nosebleeds, rhinorrhea, sore throat and trouble swallowing. Respiratory: Positive for shortness of breath. Negative for cough and chest tightness. Cardiovascular: Negative. Negative for chest pain and palpitations. Gastrointestinal: Negative. Negative for abdominal distention, abdominal pain, blood in stool and vomiting. Endocrine: Negative. Genitourinary: Negative for dysuria and hematuria. Musculoskeletal: Negative. Negative for back pain and myalgias. Skin: Negative. Negative for rash. Allergic/Immunologic: Negative. Neurological: Negative. Negative for dizziness, syncope, weakness and numbness. Hematological: Negative. Psychiatric/Behavioral: Negative. All other systems reviewed and are negative. Vitals:    06/23/18 1250   BP: 99/45   Pulse: (!) 104   Resp: 12   Temp: 99.8 °F (37.7 °C)   SpO2: 100%   Weight: 35.8 kg (79 lb)   Height: 5' 6\" (1.676 m)            Physical Exam   Constitutional: He is oriented to person, place, and time. He appears well-developed and well-nourished. No distress. Chronically mentally disabled  Nonverbal at baseline   HENT:   Head: Normocephalic and atraumatic. Right Ear: External ear normal.   Left Ear: External ear normal.   Nose: Nose normal.   Mouth/Throat: Oropharynx is clear and moist.   Eyes: Conjunctivae and EOM are normal. Pupils are equal, round, and reactive to light. Neck: Normal range of motion. Neck supple. No JVD present. No thyromegaly present. Cardiovascular: Normal rate, regular rhythm, normal heart sounds and intact distal pulses. No murmur heard.   Pulmonary/Chest: Effort normal. No respiratory distress. He has decreased breath sounds in the right lower field and the left lower field. He has no wheezes. He has no rales. Distant breath sounds   Abdominal: Soft. Bowel sounds are normal. He exhibits no distension. There is no tenderness. Musculoskeletal: Normal range of motion. He exhibits no edema. Disuse atrophy and flexure contractures of all 4 extremities   Neurological: He is alert and oriented to person, place, and time. No cranial nerve deficit. No new focal deficits    Skin: Skin is warm and dry. No rash noted. Psychiatric: He has a normal mood and affect. His behavior is normal. Thought content normal.    Note written by Miranda Milton, as dictated by Estela Pop MD 12:54 PM      [facility]      ED Course       Procedures  ED EKG interpretation:  Rhythm: sinus tachycardia; Rate (approx.): 103; Axis: normal; ST/T wave: normal; No ectopy. Note written by Miranda Milton, as dictated by Estela Pop MD 1:02 PM    PROGRESS NOTE:  2:02 PM  Patient's chest xray is negative, his lactate is normal, and his chemistry is unremarkable. His WBC is 19.8. His urinalysis is suggestive of a UTI. Patient will be admitted to hospitalist for antibiotics and further management. CONSULT NOTE:  2:04 PM Estela Pop MD spoke with Dr. Dionte Palomino, Consult for Hospitalist.  Discussed available diagnostic tests and clinical findings. Dr. Dionte Palomino will admit patient. English

## 2024-11-27 NOTE — PROGRESS NOTES
TRANSFER - IN REPORT:    Verbal report received from EULALIO Cedeño(name) on Lizzette  being received from [facility](unit) for routine progression of care      Report consisted of patients Situation, Background, Assessment and   Recommendations(SBAR). Information from the following report(s) SBAR, Kardex, Procedure Summary, Intake/Output and MAR was reviewed with the receiving nurse. Opportunity for questions and clarification was provided. Assessment completed upon patients arrival to unit and care assumed.      Primary Nurse Cassandra Sloan RN and Viral Gurrola RN performed a dual skin assessment on this patient Impairment noted- see wound doc flow sheet  Glenn score is 11 breakdown to right ear, mishel area Home